# Patient Record
Sex: MALE | Race: WHITE | HISPANIC OR LATINO | Employment: FULL TIME | URBAN - METROPOLITAN AREA
[De-identification: names, ages, dates, MRNs, and addresses within clinical notes are randomized per-mention and may not be internally consistent; named-entity substitution may affect disease eponyms.]

---

## 2017-01-27 ENCOUNTER — APPOINTMENT (OUTPATIENT)
Dept: LAB | Facility: MEDICAL CENTER | Age: 57
End: 2017-01-27
Payer: COMMERCIAL

## 2017-01-27 ENCOUNTER — ALLSCRIPTS OFFICE VISIT (OUTPATIENT)
Dept: OTHER | Facility: OTHER | Age: 57
End: 2017-01-27

## 2017-01-27 DIAGNOSIS — Z86.711 PERSONAL HISTORY OF PULMONARY EMBOLISM: ICD-10-CM

## 2017-01-27 DIAGNOSIS — Z86.718 PERSONAL HISTORY OF OTHER VENOUS THROMBOSIS AND EMBOLISM: ICD-10-CM

## 2017-01-27 DIAGNOSIS — M17.0 PRIMARY OSTEOARTHRITIS OF BOTH KNEES: ICD-10-CM

## 2017-01-27 LAB
ALBUMIN SERPL BCP-MCNC: 3.9 G/DL (ref 3.5–5)
ALP SERPL-CCNC: 75 U/L (ref 46–116)
ALT SERPL W P-5'-P-CCNC: 30 U/L (ref 12–78)
ANION GAP SERPL CALCULATED.3IONS-SCNC: 7 MMOL/L (ref 4–13)
APTT PPP: 26 SECONDS (ref 24–36)
AST SERPL W P-5'-P-CCNC: 11 U/L (ref 5–45)
BASOPHILS # BLD AUTO: 0.03 THOUSANDS/ΜL (ref 0–0.1)
BASOPHILS NFR BLD AUTO: 1 % (ref 0–1)
BILIRUB SERPL-MCNC: 0.56 MG/DL (ref 0.2–1)
BUN SERPL-MCNC: 14 MG/DL (ref 5–25)
CALCIUM SERPL-MCNC: 9.1 MG/DL (ref 8.3–10.1)
CHLORIDE SERPL-SCNC: 108 MMOL/L (ref 100–108)
CO2 SERPL-SCNC: 27 MMOL/L (ref 21–32)
CREAT SERPL-MCNC: 0.94 MG/DL (ref 0.6–1.3)
EOSINOPHIL # BLD AUTO: 0.16 THOUSAND/ΜL (ref 0–0.61)
EOSINOPHIL NFR BLD AUTO: 3 % (ref 0–6)
ERYTHROCYTE [DISTWIDTH] IN BLOOD BY AUTOMATED COUNT: 14.1 % (ref 11.6–15.1)
GFR SERPL CREATININE-BSD FRML MDRD: >60 ML/MIN/1.73SQ M
GLUCOSE SERPL-MCNC: 97 MG/DL (ref 65–140)
HCT VFR BLD AUTO: 45.5 % (ref 36.5–49.3)
HGB BLD-MCNC: 15.1 G/DL (ref 12–17)
INR PPP: 0.94 (ref 0.86–1.16)
LYMPHOCYTES # BLD AUTO: 1.39 THOUSANDS/ΜL (ref 0.6–4.47)
LYMPHOCYTES NFR BLD AUTO: 22 % (ref 14–44)
MCH RBC QN AUTO: 30.2 PG (ref 26.8–34.3)
MCHC RBC AUTO-ENTMCNC: 33.2 G/DL (ref 31.4–37.4)
MCV RBC AUTO: 91 FL (ref 82–98)
MONOCYTES # BLD AUTO: 0.47 THOUSAND/ΜL (ref 0.17–1.22)
MONOCYTES NFR BLD AUTO: 8 % (ref 4–12)
NEUTROPHILS # BLD AUTO: 4.2 THOUSANDS/ΜL (ref 1.85–7.62)
NEUTS SEG NFR BLD AUTO: 66 % (ref 43–75)
NRBC BLD AUTO-RTO: 0 /100 WBCS
PLATELET # BLD AUTO: 195 THOUSANDS/UL (ref 149–390)
PMV BLD AUTO: 11.2 FL (ref 8.9–12.7)
POTASSIUM SERPL-SCNC: 4.4 MMOL/L (ref 3.5–5.3)
PROT SERPL-MCNC: 7.3 G/DL (ref 6.4–8.2)
PROTHROMBIN TIME: 12.7 SECONDS (ref 12–14.3)
RBC # BLD AUTO: 5 MILLION/UL (ref 3.88–5.62)
SODIUM SERPL-SCNC: 142 MMOL/L (ref 136–145)
WBC # BLD AUTO: 6.27 THOUSAND/UL (ref 4.31–10.16)

## 2017-01-27 PROCEDURE — 85610 PROTHROMBIN TIME: CPT

## 2017-01-27 PROCEDURE — 85025 COMPLETE CBC W/AUTO DIFF WBC: CPT

## 2017-01-27 PROCEDURE — 85730 THROMBOPLASTIN TIME PARTIAL: CPT

## 2017-01-27 PROCEDURE — 80053 COMPREHEN METABOLIC PANEL: CPT

## 2017-01-27 PROCEDURE — 36415 COLL VENOUS BLD VENIPUNCTURE: CPT

## 2017-02-16 ENCOUNTER — APPOINTMENT (OUTPATIENT)
Dept: PHYSICAL THERAPY | Facility: CLINIC | Age: 57
End: 2017-02-16
Payer: COMMERCIAL

## 2017-02-16 PROCEDURE — 97162 PT EVAL MOD COMPLEX 30 MIN: CPT

## 2017-02-20 ENCOUNTER — APPOINTMENT (OUTPATIENT)
Dept: PHYSICAL THERAPY | Facility: CLINIC | Age: 57
End: 2017-02-20
Payer: COMMERCIAL

## 2017-02-20 PROCEDURE — 97110 THERAPEUTIC EXERCISES: CPT

## 2017-02-23 ENCOUNTER — APPOINTMENT (OUTPATIENT)
Dept: PHYSICAL THERAPY | Facility: CLINIC | Age: 57
End: 2017-02-23
Payer: COMMERCIAL

## 2017-02-24 ENCOUNTER — APPOINTMENT (OUTPATIENT)
Dept: PHYSICAL THERAPY | Facility: CLINIC | Age: 57
End: 2017-02-24
Payer: COMMERCIAL

## 2017-02-24 PROCEDURE — 97110 THERAPEUTIC EXERCISES: CPT

## 2017-02-27 ENCOUNTER — APPOINTMENT (OUTPATIENT)
Dept: PHYSICAL THERAPY | Facility: CLINIC | Age: 57
End: 2017-02-27
Payer: COMMERCIAL

## 2017-02-27 PROCEDURE — 97110 THERAPEUTIC EXERCISES: CPT

## 2017-03-02 ENCOUNTER — APPOINTMENT (OUTPATIENT)
Dept: PHYSICAL THERAPY | Facility: CLINIC | Age: 57
End: 2017-03-02
Payer: COMMERCIAL

## 2017-03-02 PROCEDURE — 97110 THERAPEUTIC EXERCISES: CPT

## 2017-03-06 ENCOUNTER — APPOINTMENT (OUTPATIENT)
Dept: PHYSICAL THERAPY | Facility: CLINIC | Age: 57
End: 2017-03-06
Payer: COMMERCIAL

## 2017-03-09 ENCOUNTER — APPOINTMENT (OUTPATIENT)
Dept: PHYSICAL THERAPY | Facility: CLINIC | Age: 57
End: 2017-03-09
Payer: COMMERCIAL

## 2017-03-10 ENCOUNTER — APPOINTMENT (OUTPATIENT)
Dept: PHYSICAL THERAPY | Facility: CLINIC | Age: 57
End: 2017-03-10
Payer: COMMERCIAL

## 2017-03-13 ENCOUNTER — APPOINTMENT (OUTPATIENT)
Dept: PHYSICAL THERAPY | Facility: CLINIC | Age: 57
End: 2017-03-13
Payer: COMMERCIAL

## 2017-03-16 ENCOUNTER — APPOINTMENT (OUTPATIENT)
Dept: PHYSICAL THERAPY | Facility: CLINIC | Age: 57
End: 2017-03-16
Payer: COMMERCIAL

## 2017-03-17 ENCOUNTER — APPOINTMENT (OUTPATIENT)
Dept: PHYSICAL THERAPY | Facility: CLINIC | Age: 57
End: 2017-03-17
Payer: COMMERCIAL

## 2017-04-24 ENCOUNTER — GENERIC CONVERSION - ENCOUNTER (OUTPATIENT)
Dept: OTHER | Facility: OTHER | Age: 57
End: 2017-04-24

## 2017-09-21 ENCOUNTER — APPOINTMENT (OUTPATIENT)
Dept: PHYSICAL THERAPY | Facility: CLINIC | Age: 57
End: 2017-09-21
Payer: COMMERCIAL

## 2017-09-21 PROCEDURE — 97161 PT EVAL LOW COMPLEX 20 MIN: CPT

## 2017-09-21 PROCEDURE — G8984 CARRY CURRENT STATUS: HCPCS

## 2017-09-21 PROCEDURE — G8985 CARRY GOAL STATUS: HCPCS

## 2017-09-21 PROCEDURE — 97035 APP MDLTY 1+ULTRASOUND EA 15: CPT

## 2017-11-21 ENCOUNTER — TRANSCRIBE ORDERS (OUTPATIENT)
Dept: ADMINISTRATIVE | Facility: HOSPITAL | Age: 57
End: 2017-11-21

## 2017-11-21 ENCOUNTER — APPOINTMENT (OUTPATIENT)
Dept: PHYSICAL THERAPY | Facility: CLINIC | Age: 57
End: 2017-11-21
Payer: COMMERCIAL

## 2017-11-21 ENCOUNTER — HOSPITAL ENCOUNTER (OUTPATIENT)
Dept: RADIOLOGY | Facility: HOSPITAL | Age: 57
Discharge: HOME | End: 2017-11-21
Attending: ORTHOPAEDIC SURGERY
Payer: COMMERCIAL

## 2017-11-21 ENCOUNTER — APPOINTMENT (OUTPATIENT)
Dept: PREADMISSION TESTING | Facility: HOSPITAL | Age: 57
End: 2017-11-21
Payer: COMMERCIAL

## 2017-11-21 ENCOUNTER — ANESTHESIA EVENT (OUTPATIENT)
Dept: PERIOP | Facility: HOSPITAL | Age: 57
DRG: 470 | End: 2017-11-21
Payer: COMMERCIAL

## 2017-11-21 ENCOUNTER — APPOINTMENT (OUTPATIENT)
Dept: LAB | Facility: HOSPITAL | Age: 57
End: 2017-11-21
Attending: ORTHOPAEDIC SURGERY
Payer: COMMERCIAL

## 2017-11-21 ENCOUNTER — HOSPITAL ENCOUNTER (OUTPATIENT)
Dept: NON INVASIVE DIAGNOSTICS | Facility: HOSPITAL | Age: 57
Discharge: HOME | End: 2017-11-21
Attending: ORTHOPAEDIC SURGERY
Payer: COMMERCIAL

## 2017-11-21 VITALS
WEIGHT: 232 LBS | TEMPERATURE: 97.6 F | RESPIRATION RATE: 18 BRPM | BODY MASS INDEX: 32.48 KG/M2 | HEIGHT: 71 IN | DIASTOLIC BLOOD PRESSURE: 84 MMHG | SYSTOLIC BLOOD PRESSURE: 132 MMHG | HEART RATE: 58 BPM

## 2017-11-21 DIAGNOSIS — M17.11 OSTEOARTHRITIS OF RIGHT KNEE, UNSPECIFIED OSTEOARTHRITIS TYPE: ICD-10-CM

## 2017-11-21 DIAGNOSIS — Z01.818 PREOP EXAMINATION: Primary | ICD-10-CM

## 2017-11-21 DIAGNOSIS — Z01.818 PREOP EXAMINATION: ICD-10-CM

## 2017-11-21 LAB
ABO GROUP BLD: NORMAL
ALBUMIN SERPL BCP-MCNC: 3.8 G/DL (ref 3.5–5)
ALP SERPL-CCNC: 68 U/L (ref 46–116)
ALT SERPL W P-5'-P-CCNC: 26 U/L (ref 12–78)
ANION GAP SERPL CALCULATED.3IONS-SCNC: 10 MMOL/L (ref 4–13)
AST SERPL W P-5'-P-CCNC: 17 U/L (ref 5–45)
ATRIAL RATE: 51 BPM
BASOPHILS # BLD AUTO: 0.02 THOUSANDS/ΜL (ref 0–0.1)
BASOPHILS NFR BLD AUTO: 0 % (ref 0–1)
BILIRUB SERPL-MCNC: 0.63 MG/DL (ref 0.2–1)
BILIRUB UR QL STRIP: NEGATIVE
BLD GP AB SCN SERPL QL: NEGATIVE
BUN SERPL-MCNC: 16 MG/DL (ref 5–25)
CALCIUM SERPL-MCNC: 9 MG/DL (ref 8.3–10.1)
CHLORIDE SERPL-SCNC: 107 MMOL/L (ref 100–108)
CLARITY UR: CLEAR
CO2 SERPL-SCNC: 25 MMOL/L (ref 21–32)
COLOR UR: YELLOW
CREAT SERPL-MCNC: 0.93 MG/DL (ref 0.6–1.3)
EOSINOPHIL # BLD AUTO: 0.12 THOUSAND/ΜL (ref 0–0.61)
EOSINOPHIL NFR BLD AUTO: 2 % (ref 0–6)
ERYTHROCYTE [DISTWIDTH] IN BLOOD BY AUTOMATED COUNT: 14.2 % (ref 11.6–15.1)
GFR SERPL CREATININE-BSD FRML MDRD: 91 ML/MIN/1.73SQ M
GLUCOSE SERPL-MCNC: 95 MG/DL (ref 65–140)
GLUCOSE UR STRIP-MCNC: NEGATIVE MG/DL
HCT VFR BLD AUTO: 42.5 % (ref 36.5–49.3)
HGB BLD-MCNC: 14.5 G/DL (ref 12–17)
HGB UR QL STRIP.AUTO: NEGATIVE
INR PPP: 0.97 (ref 0.86–1.16)
KETONES UR STRIP-MCNC: NEGATIVE MG/DL
LEUKOCYTE ESTERASE UR QL STRIP: NEGATIVE
LYMPHOCYTES # BLD AUTO: 1.16 THOUSANDS/ΜL (ref 0.6–4.47)
LYMPHOCYTES NFR BLD AUTO: 22 % (ref 14–44)
MCH RBC QN AUTO: 31.2 PG (ref 26.8–34.3)
MCHC RBC AUTO-ENTMCNC: 34.1 G/DL (ref 31.4–37.4)
MCV RBC AUTO: 91 FL (ref 82–98)
MONOCYTES # BLD AUTO: 0.41 THOUSAND/ΜL (ref 0.17–1.22)
MONOCYTES NFR BLD AUTO: 8 % (ref 4–12)
NEUTROPHILS # BLD AUTO: 3.57 THOUSANDS/ΜL (ref 1.85–7.62)
NEUTS SEG NFR BLD AUTO: 68 % (ref 43–75)
NITRITE UR QL STRIP: NEGATIVE
NRBC BLD AUTO-RTO: 0 /100 WBCS
P AXIS: 41 DEGREES
PH UR STRIP.AUTO: 6 [PH] (ref 4.5–8)
PLATELET # BLD AUTO: 167 THOUSANDS/UL (ref 149–390)
PMV BLD AUTO: 11.7 FL (ref 8.9–12.7)
POTASSIUM SERPL-SCNC: 4 MMOL/L (ref 3.5–5.3)
PR INTERVAL: 154 MS
PROT SERPL-MCNC: 6.8 G/DL (ref 6.4–8.2)
PROT UR STRIP-MCNC: NEGATIVE MG/DL
PROTHROMBIN TIME: 12.9 SECONDS (ref 12.1–14.4)
QRS AXIS: 4 DEGREES
QRSD INTERVAL: 96 MS
QT INTERVAL: 420 MS
QTC INTERVAL: 387 MS
RBC # BLD AUTO: 4.65 MILLION/UL (ref 3.88–5.62)
RH BLD: POSITIVE
SODIUM SERPL-SCNC: 142 MMOL/L (ref 136–145)
SP GR UR STRIP.AUTO: 1.02 (ref 1–1.03)
SPECIMEN EXPIRATION DATE: NORMAL
T WAVE AXIS: 39 DEGREES
UROBILINOGEN UR QL STRIP.AUTO: 0.2 E.U./DL
VENTRICULAR RATE: 51 BPM
WBC # BLD AUTO: 5.28 THOUSAND/UL (ref 4.31–10.16)

## 2017-11-21 PROCEDURE — 93005 ELECTROCARDIOGRAM TRACING: CPT

## 2017-11-21 PROCEDURE — 97164 PT RE-EVAL EST PLAN CARE: CPT

## 2017-11-21 PROCEDURE — 85025 COMPLETE CBC W/AUTO DIFF WBC: CPT

## 2017-11-21 PROCEDURE — 36415 COLL VENOUS BLD VENIPUNCTURE: CPT

## 2017-11-21 PROCEDURE — G8978 MOBILITY CURRENT STATUS: HCPCS

## 2017-11-21 PROCEDURE — 86850 RBC ANTIBODY SCREEN: CPT

## 2017-11-21 PROCEDURE — 86901 BLOOD TYPING SEROLOGIC RH(D): CPT

## 2017-11-21 PROCEDURE — 86900 BLOOD TYPING SEROLOGIC ABO: CPT

## 2017-11-21 PROCEDURE — 80053 COMPREHEN METABOLIC PANEL: CPT

## 2017-11-21 PROCEDURE — 97110 THERAPEUTIC EXERCISES: CPT

## 2017-11-21 PROCEDURE — 71020 HB CHEST X-RAY 2VW FRONTAL&LATL: CPT

## 2017-11-21 PROCEDURE — 81003 URINALYSIS AUTO W/O SCOPE: CPT | Performed by: ORTHOPAEDIC SURGERY

## 2017-11-21 PROCEDURE — G8979 MOBILITY GOAL STATUS: HCPCS

## 2017-11-21 PROCEDURE — 85610 PROTHROMBIN TIME: CPT

## 2017-11-21 RX ORDER — SODIUM CHLORIDE 9 MG/ML
125 INJECTION, SOLUTION INTRAVENOUS CONTINUOUS
Status: CANCELLED | OUTPATIENT
Start: 2017-11-21

## 2017-11-21 NOTE — ANESTHESIA PREPROCEDURE EVALUATION
Review of Systems/Medical History  Patient summary reviewed  Chart reviewed      Cardiovascular  Exercise tolerance: good,     Pulmonary  ,   Comment: Hx pe       GI/Hepatic  Negative GI/hepatic ROS          Negative  ROS        Endo/Other  Negative endo/other ROS Arthritis     GYN       Hematology   Musculoskeletal  Negative musculoskeletal ROS        Neurology  Negative neurology ROS      Psychology   Negative psychology ROS            Physical Exam    Airway    Mallampati score: II  TM Distance: <3 FB  Neck ROM: full     Dental       Cardiovascular  Rhythm: regular, Rate: normal,     Pulmonary  Breath sounds clear to auscultation,     Other Findings  cap      Anesthesia Plan  ASA Score- 2       Anesthesia Type- regional and spinal with ASA Monitors  Additional Monitors:   Airway Plan:           Induction- intravenous  Informed Consent- Anesthetic plan and risks discussed with patient

## 2017-11-28 ENCOUNTER — ALLSCRIPTS OFFICE VISIT (OUTPATIENT)
Dept: OTHER | Facility: OTHER | Age: 57
End: 2017-11-28

## 2017-11-28 ENCOUNTER — APPOINTMENT (OUTPATIENT)
Dept: PHYSICAL THERAPY | Facility: CLINIC | Age: 57
End: 2017-11-28
Payer: COMMERCIAL

## 2017-11-28 PROCEDURE — 97140 MANUAL THERAPY 1/> REGIONS: CPT

## 2017-11-28 PROCEDURE — G8980 MOBILITY D/C STATUS: HCPCS

## 2017-11-28 PROCEDURE — G8979 MOBILITY GOAL STATUS: HCPCS

## 2017-11-28 PROCEDURE — 97110 THERAPEUTIC EXERCISES: CPT

## 2017-11-29 NOTE — PROGRESS NOTES
Assessment    1  Encounter for pre-operative examination (V72 84) (Z01 818)   2  Arthritis of right knee (716 96) (M17 11)    Discussion/Summary    Preop clearance for upcoming R TKR on 12/5 at [facility] by Dr Lcuero Senior  otherwise pt is doing well today  pt does not take any Rx medications  Patient has history of DVT with physical exam following knee surgery in 1994  His examination today is unremarkable  Pre-admission testing from November 21st, including chest x-ray, EKG, and labs were reviewed today  Due to past provoked DVT/PE following surgery, I agree with Dr Eloina Garcia recommendation to use low molecular weight heparin 1 day prior to surgery and warfarin for 6 weeks following surgery  is medically cleared for surgery  Chief Complaint  Pt presents for pre-op clearance for R total knee replacement surgery being done on 17/1/39 by Dr Talita Rocha [place] [id number]  Pt had PATs already done @ [facility]  History of Present Illness  HPI: Preop clearance for upcoming R TKR on 12/5 at [facility] by Dr Lucero Senior  otherwise pt is doing well today  pt does not take any Rx medications  Patient has history of DVT with physical exam following knee surgery in 1994  Review of Systems   Constitutional: No fever or chills, feels well, no tiredness, no recent weight gain or weight loss  Cardiovascular: No complaints of slow heart rate, no fast heart rate, no chest pain, no palpitations, no leg claudication, no lower extremity  Respiratory: No complaints of shortness of breath, no wheezing, no cough, no SOB on exertion, no orthopnea or PND  Gastrointestinal: No complaints of abdominal pain, no constipation, no nausea or vomiting, no diarrhea or bloody stools  Genitourinary: No complaints of dysuria, no incontinence, no hesitancy, no nocturia, no genital lesion, no testicular pain  Musculoskeletal: as noted in HPI  Active Problems  1  Bilateral hearing loss (389 9) (H91 93)   2   Disc degeneration, lumbar (722 52) (M51 36)   3  Fatigue (780 79) (R53 83)   4  Food allergy (V15 05) (Z91 018)   5  GERD without esophagitis (530 81) (K21 9)   6  Osteoarthritis of both knees, unspecified osteoarthritis type (715 96) (M17 0)   7  Squamous cell carcinomatosis (199 0) (C80 0)    Past Medical History   · History of Atypical chest pain (786 59) (R07 89)   · History of Encounter for screening colonoscopy (V76 51) (Z12 11)   · History of DVT (deep vein thrombosis) (V12 51) (Z86 718)   · History of influenza vaccination (V49 89) (Z92 29)   · History of pulmonary embolism (V12 55) (Z86 711)   · History of Need for immunization against influenza (V04 81) (Z23)    Surgical History   · History of Arthroscopy Knee Right   · History of Colonoscopy (Fiberoptic)    The surgical history was reviewed and updated today  Family History  Mother    · Family history of Alzheimer's disease of other onset without behavioral disturbance  Father    · Family history of Acute congestive heart failure, unspecified congestive heart failure type  Brother    · Family history of leukemia (V16 6) (Z80 6)  Paternal Grandmother    · Family history of diabetes mellitus (V18 0) (Z83 3)  Paternal Grandfather    · Family history of malignant neoplasm (V16 9) (Z80 9)    Social History     · Denied: History of Alcohol use   · Always uses seat belt   · Current some day smoker (305 1) (F17 200)   · No guns in the home   · Primary language is English   · Reads English  The social history was reviewed and updated today  The social history was reviewed and is unchanged  Current Meds   1  Allegra Allergy TABS; Therapy: (Recorded:[id number]) to Recorded    The medication list was reviewed and updated today  Allergies  1  No Known Drug Allergies  2  Peanuts   3  Soy   4   Wheat    Vitals   Recorded: 16OWF9904 10:53AM   Temperature 97 1 F, Tympanic   Heart Rate 59   Pulse Quality Normal   Respiration Quality Normal   Respiration 14   Systolic 326, LUE, Sitting Diastolic 76, LUE, Sitting   BP CUFF SIZE Large   Height 5 ft 10 in   Weight 228 lb 6 oz   BMI Calculated 32 77   BSA Calculated 2 21   O2 Saturation 98       Physical Exam   Constitutional  General appearance: No acute distress, well appearing and well nourished  Pulmonary  Respiratory effort: No increased work of breathing or signs of respiratory distress  Auscultation of lungs: Clear to auscultation  Cardiovascular  Palpation of heart: Normal PMI, no thrills  Auscultation of heart: Normal rate and rhythm, normal S1 and S2, without murmurs  Examination of extremities for edema and/or varicosities: Normal    Abdomen  Abdomen: Non-tender, no masses  Liver and spleen: No hepatomegaly or splenomegaly  Lymphatic  Palpation of lymph nodes in neck: No lymphadenopathy  Musculoskeletal  Gait and station: Normal    Psychiatric  Orientation to person, place and time: Normal    Mood and affect: Normal        Results/Data  PHQ-9 Adult Depression Screening 28Nov2017 10:57AM User, [facility]     Test Name Result Flag Reference   PHQ-9 Adult Depression Score 2       Over the last two weeks, how often have you been bothered by any of the following problems? Little interest or pleasure in doing things: Not at all - 0 Feeling down, depressed, or hopeless: Not at all - 0 Trouble falling or staying asleep, or sleeping too much: Several days - 1 Feeling tired or having little energy: Several days - 1 Poor appetite or over eating: Not at all - 0 Feeling bad about yourself - or that you are a failure or have let yourself or your family down: Not at all - 0 Trouble concentrating on things, such as reading the newspaper or watching television: Not at all - 0 Moving or speaking so slowly that other people could have noticed   Or the opposite -  being so fidgety or restless that you have been moving around a lot more than usual: Not at all - 0 Thoughts that you would be better off dead, or of hurting yourself in some way: Not at all - 0   PHQ-9 Adult Depression Screening Negative     PHQ-9 Difficulty Level Not difficult at all     PHQ-9 Severity Minimal Depression         End of Encounter Meds    1  Allegra Allergy TABS (Fexofenadine HCl); Therapy: (Recorded:[id number]) to Recorded    Signatures   Electronically signed by :  Wandy Deras DO; Nov 28 2017 11:20AM EST                       (Author)

## 2017-12-05 ENCOUNTER — ANESTHESIA (OUTPATIENT)
Dept: PERIOP | Facility: HOSPITAL | Age: 57
DRG: 470 | End: 2017-12-05
Payer: COMMERCIAL

## 2017-12-05 ENCOUNTER — HOSPITAL ENCOUNTER (INPATIENT)
Facility: HOSPITAL | Age: 57
LOS: 2 days | Discharge: HOME HEALTH | DRG: 470 | End: 2017-12-07
Attending: ORTHOPAEDIC SURGERY | Admitting: ORTHOPAEDIC SURGERY
Payer: COMMERCIAL

## 2017-12-05 ENCOUNTER — APPOINTMENT (INPATIENT)
Dept: RADIOLOGY | Facility: HOSPITAL | Age: 57
DRG: 470 | End: 2017-12-05
Payer: COMMERCIAL

## 2017-12-05 DIAGNOSIS — M17.11 PRIMARY OSTEOARTHRITIS OF RIGHT KNEE: Primary | ICD-10-CM

## 2017-12-05 LAB
INR PPP: 0.94 (ref 0.86–1.16)
PROTHROMBIN TIME: 12.6 SECONDS (ref 12.1–14.4)

## 2017-12-05 PROCEDURE — C1713 ANCHOR/SCREW BN/BN,TIS/BN: HCPCS | Performed by: ORTHOPAEDIC SURGERY

## 2017-12-05 PROCEDURE — G8978 MOBILITY CURRENT STATUS: HCPCS

## 2017-12-05 PROCEDURE — 97163 PT EVAL HIGH COMPLEX 45 MIN: CPT

## 2017-12-05 PROCEDURE — C1776 JOINT DEVICE (IMPLANTABLE): HCPCS | Performed by: ORTHOPAEDIC SURGERY

## 2017-12-05 PROCEDURE — 85610 PROTHROMBIN TIME: CPT | Performed by: ORTHOPAEDIC SURGERY

## 2017-12-05 PROCEDURE — 94762 N-INVAS EAR/PLS OXIMTRY CONT: CPT

## 2017-12-05 PROCEDURE — 0SRC0J9 REPLACEMENT OF RIGHT KNEE JOINT WITH SYNTHETIC SUBSTITUTE, CEMENTED, OPEN APPROACH: ICD-10-PCS | Performed by: ORTHOPAEDIC SURGERY

## 2017-12-05 PROCEDURE — G8979 MOBILITY GOAL STATUS: HCPCS

## 2017-12-05 PROCEDURE — 73560 X-RAY EXAM OF KNEE 1 OR 2: CPT

## 2017-12-05 DEVICE — ATTUNE KNEE SYSTEM TIBIAL INSERT FIXED BEARING POSTERIOR STABILIZED 7 7MM AOX
Type: IMPLANTABLE DEVICE | Site: KNEE | Status: FUNCTIONAL
Brand: ATTUNE

## 2017-12-05 DEVICE — SMARTSET GMV HIGH PERFORMANCE GENTAMICIN MEDIUM VISCOSITY BONE CEMENT 40G
Type: IMPLANTABLE DEVICE | Site: KNEE | Status: FUNCTIONAL
Brand: SMARTSET

## 2017-12-05 DEVICE — ATTUNE KNEE SYSTEM TIBIAL BASE FIXED BEARING SIZE 7 CEMENTED
Type: IMPLANTABLE DEVICE | Site: KNEE | Status: FUNCTIONAL
Brand: ATTUNE

## 2017-12-05 DEVICE — ATTUNE PATELLA MEDIALIZED ANATOMIC 35MM CEMENTED AOX
Type: IMPLANTABLE DEVICE | Site: KNEE | Status: FUNCTIONAL
Brand: ATTUNE

## 2017-12-05 DEVICE — ATTUNE KNEE SYSTEM FEMORAL POSTERIOR STABILIZED SIZE 7 RIGHT CEMENTED
Type: IMPLANTABLE DEVICE | Site: KNEE | Status: FUNCTIONAL
Brand: ATTUNE

## 2017-12-05 RX ORDER — ZOLPIDEM TARTRATE 5 MG/1
5 TABLET ORAL
Status: DISCONTINUED | OUTPATIENT
Start: 2017-12-06 | End: 2017-12-07 | Stop reason: HOSPADM

## 2017-12-05 RX ORDER — ONDANSETRON 2 MG/ML
INJECTION INTRAMUSCULAR; INTRAVENOUS AS NEEDED
Status: DISCONTINUED | OUTPATIENT
Start: 2017-12-05 | End: 2017-12-05 | Stop reason: SURG

## 2017-12-05 RX ORDER — OXYCODONE HYDROCHLORIDE 10 MG/1
10 TABLET ORAL EVERY 4 HOURS PRN
Status: DISCONTINUED | OUTPATIENT
Start: 2017-12-06 | End: 2017-12-07 | Stop reason: HOSPADM

## 2017-12-05 RX ORDER — LORATADINE 10 MG/1
5 TABLET ORAL
Status: DISCONTINUED | OUTPATIENT
Start: 2017-12-05 | End: 2017-12-05

## 2017-12-05 RX ORDER — KETOROLAC TROMETHAMINE 30 MG/ML
30 INJECTION, SOLUTION INTRAMUSCULAR; INTRAVENOUS EVERY 6 HOURS
Status: DISCONTINUED | OUTPATIENT
Start: 2017-12-05 | End: 2017-12-07 | Stop reason: HOSPADM

## 2017-12-05 RX ORDER — ONDANSETRON 2 MG/ML
4 INJECTION INTRAMUSCULAR; INTRAVENOUS EVERY 8 HOURS PRN
Status: DISCONTINUED | OUTPATIENT
Start: 2017-12-06 | End: 2017-12-07 | Stop reason: HOSPADM

## 2017-12-05 RX ORDER — LORATADINE 10 MG/1
10 TABLET ORAL DAILY
Status: DISCONTINUED | OUTPATIENT
Start: 2017-12-06 | End: 2017-12-07 | Stop reason: HOSPADM

## 2017-12-05 RX ORDER — ONDANSETRON 2 MG/ML
4 INJECTION INTRAMUSCULAR; INTRAVENOUS ONCE AS NEEDED
Status: DISCONTINUED | OUTPATIENT
Start: 2017-12-05 | End: 2017-12-05 | Stop reason: HOSPADM

## 2017-12-05 RX ORDER — MAGNESIUM HYDROXIDE 1200 MG/15ML
LIQUID ORAL AS NEEDED
Status: DISCONTINUED | OUTPATIENT
Start: 2017-12-05 | End: 2017-12-05 | Stop reason: HOSPADM

## 2017-12-05 RX ORDER — CALCIUM CARBONATE 200(500)MG
1000 TABLET,CHEWABLE ORAL DAILY PRN
Status: DISCONTINUED | OUTPATIENT
Start: 2017-12-05 | End: 2017-12-07 | Stop reason: HOSPADM

## 2017-12-05 RX ORDER — METHOCARBAMOL 500 MG/1
500 TABLET, FILM COATED ORAL EVERY 6 HOURS PRN
Status: DISCONTINUED | OUTPATIENT
Start: 2017-12-05 | End: 2017-12-07 | Stop reason: HOSPADM

## 2017-12-05 RX ORDER — CELECOXIB 200 MG/1
200 CAPSULE ORAL DAILY
Status: DISCONTINUED | OUTPATIENT
Start: 2017-12-05 | End: 2017-12-07 | Stop reason: HOSPADM

## 2017-12-05 RX ORDER — TRAMADOL HYDROCHLORIDE 50 MG/1
50 TABLET ORAL EVERY 6 HOURS PRN
Status: DISCONTINUED | OUTPATIENT
Start: 2017-12-06 | End: 2017-12-07 | Stop reason: HOSPADM

## 2017-12-05 RX ORDER — NALOXONE HYDROCHLORIDE 0.4 MG/ML
0.04 INJECTION, SOLUTION INTRAMUSCULAR; INTRAVENOUS; SUBCUTANEOUS
Status: DISCONTINUED | OUTPATIENT
Start: 2017-12-06 | End: 2017-12-07 | Stop reason: HOSPADM

## 2017-12-05 RX ORDER — DOCUSATE SODIUM 100 MG/1
100 CAPSULE, LIQUID FILLED ORAL 2 TIMES DAILY
Status: DISCONTINUED | OUTPATIENT
Start: 2017-12-05 | End: 2017-12-07 | Stop reason: HOSPADM

## 2017-12-05 RX ORDER — HEPARIN SODIUM 5000 [USP'U]/ML
INJECTION, SOLUTION INTRAVENOUS; SUBCUTANEOUS AS NEEDED
Status: DISCONTINUED | OUTPATIENT
Start: 2017-12-05 | End: 2017-12-05 | Stop reason: SURG

## 2017-12-05 RX ORDER — FENTANYL CITRATE 50 UG/ML
INJECTION, SOLUTION INTRAMUSCULAR; INTRAVENOUS AS NEEDED
Status: DISCONTINUED | OUTPATIENT
Start: 2017-12-05 | End: 2017-12-05 | Stop reason: SURG

## 2017-12-05 RX ORDER — MORPHINE SULFATE 0.5 MG/ML
INJECTION, SOLUTION EPIDURAL; INTRATHECAL; INTRAVENOUS AS NEEDED
Status: DISCONTINUED | OUTPATIENT
Start: 2017-12-05 | End: 2017-12-05 | Stop reason: SURG

## 2017-12-05 RX ORDER — KETOROLAC TROMETHAMINE 30 MG/ML
15 INJECTION, SOLUTION INTRAMUSCULAR; INTRAVENOUS EVERY 6 HOURS PRN
Status: DISCONTINUED | OUTPATIENT
Start: 2017-12-09 | End: 2017-12-07 | Stop reason: HOSPADM

## 2017-12-05 RX ORDER — BUPIVACAINE HYDROCHLORIDE 7.5 MG/ML
INJECTION, SOLUTION INTRASPINAL AS NEEDED
Status: DISCONTINUED | OUTPATIENT
Start: 2017-12-05 | End: 2017-12-05 | Stop reason: SURG

## 2017-12-05 RX ORDER — MORPHINE SULFATE 2 MG/ML
2 INJECTION, SOLUTION INTRAMUSCULAR; INTRAVENOUS EVERY 2 HOUR PRN
Status: DISCONTINUED | OUTPATIENT
Start: 2017-12-06 | End: 2017-12-07 | Stop reason: HOSPADM

## 2017-12-05 RX ORDER — MIDAZOLAM HYDROCHLORIDE 1 MG/ML
INJECTION INTRAMUSCULAR; INTRAVENOUS AS NEEDED
Status: DISCONTINUED | OUTPATIENT
Start: 2017-12-05 | End: 2017-12-05 | Stop reason: SURG

## 2017-12-05 RX ORDER — SODIUM CHLORIDE, SODIUM LACTATE, POTASSIUM CHLORIDE, CALCIUM CHLORIDE 600; 310; 30; 20 MG/100ML; MG/100ML; MG/100ML; MG/100ML
100 INJECTION, SOLUTION INTRAVENOUS CONTINUOUS
Status: DISCONTINUED | OUTPATIENT
Start: 2017-12-05 | End: 2017-12-07 | Stop reason: HOSPADM

## 2017-12-05 RX ORDER — TRANEXAMIC ACID 100 MG/ML
INJECTION, SOLUTION INTRAVENOUS AS NEEDED
Status: DISCONTINUED | OUTPATIENT
Start: 2017-12-05 | End: 2017-12-05 | Stop reason: HOSPADM

## 2017-12-05 RX ORDER — PROPOFOL 10 MG/ML
INJECTION, EMULSION INTRAVENOUS CONTINUOUS PRN
Status: DISCONTINUED | OUTPATIENT
Start: 2017-12-05 | End: 2017-12-05 | Stop reason: SURG

## 2017-12-05 RX ORDER — ACETAMINOPHEN 325 MG/1
650 TABLET ORAL EVERY 6 HOURS PRN
Status: DISCONTINUED | OUTPATIENT
Start: 2017-12-05 | End: 2017-12-07 | Stop reason: HOSPADM

## 2017-12-05 RX ORDER — SODIUM CHLORIDE 9 MG/ML
125 INJECTION, SOLUTION INTRAVENOUS CONTINUOUS
Status: DISCONTINUED | OUTPATIENT
Start: 2017-12-05 | End: 2017-12-07 | Stop reason: HOSPADM

## 2017-12-05 RX ORDER — NALOXONE HYDROCHLORIDE 0.4 MG/ML
0.1 INJECTION, SOLUTION INTRAMUSCULAR; INTRAVENOUS; SUBCUTANEOUS
Status: ACTIVE | OUTPATIENT
Start: 2017-12-05 | End: 2017-12-06

## 2017-12-05 RX ORDER — OXYCODONE HYDROCHLORIDE 5 MG/1
5-10 TABLET ORAL EVERY 4 HOURS PRN
Qty: 60 TABLET | Refills: 0 | Status: SHIPPED | OUTPATIENT
Start: 2017-12-06 | End: 2018-02-19 | Stop reason: SDUPTHER

## 2017-12-05 RX ORDER — DEXAMETHASONE SODIUM PHOSPHATE 4 MG/ML
8 INJECTION, SOLUTION INTRA-ARTICULAR; INTRALESIONAL; INTRAMUSCULAR; INTRAVENOUS; SOFT TISSUE ONCE AS NEEDED
Status: ACTIVE | OUTPATIENT
Start: 2017-12-05 | End: 2017-12-06

## 2017-12-05 RX ORDER — ONDANSETRON 2 MG/ML
4 INJECTION INTRAMUSCULAR; INTRAVENOUS EVERY 4 HOURS PRN
Status: ACTIVE | OUTPATIENT
Start: 2017-12-05 | End: 2017-12-06

## 2017-12-05 RX ORDER — OXYCODONE HYDROCHLORIDE 5 MG/1
5 TABLET ORAL EVERY 4 HOURS PRN
Status: DISCONTINUED | OUTPATIENT
Start: 2017-12-06 | End: 2017-12-07 | Stop reason: HOSPADM

## 2017-12-05 RX ORDER — TRAMADOL HYDROCHLORIDE 50 MG/1
50 TABLET ORAL EVERY 6 HOURS PRN
Qty: 60 TABLET | Refills: 0 | Status: SHIPPED | OUTPATIENT
Start: 2017-12-06 | End: 2018-02-19 | Stop reason: SDUPTHER

## 2017-12-05 RX ORDER — DIPHENHYDRAMINE HYDROCHLORIDE 50 MG/ML
50 INJECTION INTRAMUSCULAR; INTRAVENOUS EVERY 6 HOURS PRN
Status: DISCONTINUED | OUTPATIENT
Start: 2017-12-05 | End: 2017-12-07 | Stop reason: HOSPADM

## 2017-12-05 RX ORDER — BISACODYL 10 MG
10 SUPPOSITORY, RECTAL RECTAL DAILY PRN
Status: DISCONTINUED | OUTPATIENT
Start: 2017-12-05 | End: 2017-12-07 | Stop reason: HOSPADM

## 2017-12-05 RX ORDER — WARFARIN SODIUM 5 MG/1
5 TABLET ORAL
Status: COMPLETED | OUTPATIENT
Start: 2017-12-05 | End: 2017-12-05

## 2017-12-05 RX ORDER — METOCLOPRAMIDE HYDROCHLORIDE 5 MG/ML
5 INJECTION INTRAMUSCULAR; INTRAVENOUS EVERY 6 HOURS PRN
Status: ACTIVE | OUTPATIENT
Start: 2017-12-05 | End: 2017-12-06

## 2017-12-05 RX ADMIN — BUPIVACAINE HYDROCHLORIDE IN DEXTROSE 1.8 ML: 7.5 INJECTION, SOLUTION SUBARACHNOID at 08:43

## 2017-12-05 RX ADMIN — DEXAMETHASONE SODIUM PHOSPHATE 4 MG: 10 INJECTION INTRAMUSCULAR; INTRAVENOUS at 08:47

## 2017-12-05 RX ADMIN — SODIUM CHLORIDE: 0.9 INJECTION, SOLUTION INTRAVENOUS at 08:37

## 2017-12-05 RX ADMIN — MIDAZOLAM HYDROCHLORIDE 4 MG: 1 INJECTION, SOLUTION INTRAMUSCULAR; INTRAVENOUS at 08:25

## 2017-12-05 RX ADMIN — MORPHINE SULFATE 0.15 MG: 0.5 INJECTION, SOLUTION EPIDURAL; INTRATHECAL; INTRAVENOUS at 08:43

## 2017-12-05 RX ADMIN — SODIUM CHLORIDE, SODIUM LACTATE, POTASSIUM CHLORIDE, AND CALCIUM CHLORIDE 100 ML/HR: .6; .31; .03; .02 INJECTION, SOLUTION INTRAVENOUS at 11:52

## 2017-12-05 RX ADMIN — HEPARIN SODIUM 5000 UNITS: 5000 INJECTION, SOLUTION INTRAVENOUS; SUBCUTANEOUS at 09:51

## 2017-12-05 RX ADMIN — ONDANSETRON HYDROCHLORIDE 4 MG: 2 INJECTION, SOLUTION INTRAVENOUS at 08:47

## 2017-12-05 RX ADMIN — KETOROLAC TROMETHAMINE 15 MG: 30 INJECTION, SOLUTION INTRAMUSCULAR at 17:16

## 2017-12-05 RX ADMIN — FENTANYL CITRATE 50 MCG: 50 INJECTION INTRAMUSCULAR; INTRAVENOUS at 08:25

## 2017-12-05 RX ADMIN — DOCUSATE SODIUM 100 MG: 100 CAPSULE, LIQUID FILLED ORAL at 14:25

## 2017-12-05 RX ADMIN — CEFAZOLIN SODIUM 1000 MG: 1 SOLUTION INTRAVENOUS at 17:15

## 2017-12-05 RX ADMIN — Medication 1 TABLET: at 14:25

## 2017-12-05 RX ADMIN — CEFAZOLIN SODIUM 1000 MG: 1 SOLUTION INTRAVENOUS at 23:54

## 2017-12-05 RX ADMIN — SODIUM CHLORIDE, SODIUM LACTATE, POTASSIUM CHLORIDE, AND CALCIUM CHLORIDE 100 ML/HR: .6; .31; .03; .02 INJECTION, SOLUTION INTRAVENOUS at 21:26

## 2017-12-05 RX ADMIN — METHOCARBAMOL 500 MG: 500 TABLET ORAL at 21:52

## 2017-12-05 RX ADMIN — CELECOXIB 200 MG: 200 CAPSULE ORAL at 14:25

## 2017-12-05 RX ADMIN — KETOROLAC TROMETHAMINE 30 MG: 30 INJECTION, SOLUTION INTRAMUSCULAR at 17:18

## 2017-12-05 RX ADMIN — DOCUSATE SODIUM 100 MG: 100 CAPSULE, LIQUID FILLED ORAL at 17:15

## 2017-12-05 RX ADMIN — KETOROLAC TROMETHAMINE 30 MG: 30 INJECTION, SOLUTION INTRAMUSCULAR at 23:47

## 2017-12-05 RX ADMIN — PROPOFOL 80 MCG/KG/MIN: 10 INJECTION, EMULSION INTRAVENOUS at 08:47

## 2017-12-05 RX ADMIN — KETOROLAC TROMETHAMINE 15 MG: 30 INJECTION, SOLUTION INTRAMUSCULAR at 17:14

## 2017-12-05 RX ADMIN — WARFARIN SODIUM 5 MG: 5 TABLET ORAL at 17:15

## 2017-12-05 RX ADMIN — CEFAZOLIN SODIUM 2000 MG: 2 SOLUTION INTRAVENOUS at 08:38

## 2017-12-05 RX ADMIN — SODIUM CHLORIDE, SODIUM LACTATE, POTASSIUM CHLORIDE, AND CALCIUM CHLORIDE 100 ML/HR: .6; .31; .03; .02 INJECTION, SOLUTION INTRAVENOUS at 10:41

## 2017-12-05 NOTE — ANESTHESIA POSTPROCEDURE EVALUATION
Post-Op Assessment Note      CV Status:  Stable    Mental Status:  Alert and awake    Hydration Status:  Euvolemic    PONV Controlled:  Controlled    Airway Patency:  Patent    Post Op Vitals Reviewed: Yes          Staff: Anesthesiologist           /70 (12/05/17 1149)    Temp      Pulse (!) 50 (12/05/17 1149)   Resp 16 (12/05/17 1149)    SpO2 98 % (12/05/17 1149)

## 2017-12-05 NOTE — ANESTHESIA PROCEDURE NOTES
Peripheral Block    Start time: 12/5/2017 8:25 AM  End time: 12/5/2017 8:34 AM  Reason for block: at surgeon's request and post-op pain management  Staffing  Anesthesiologist: Nasir Alexandra  Preanesthetic Checklist  Completed: patient identified, site marked, surgical consent, pre-op evaluation, timeout performed, IV checked, risks and benefits discussed and monitors and equipment checked  Peripheral Block  Patient position: supine  Prep: ChloraPrep  Patient monitoring: heart rate, continuous pulse ox and frequent blood pressure checks  Block type: adductor canal block  Laterality: right  ultrasound permanent image saved      Needle  Needle type: Stimuplex   Needle gauge: 22 G  Needle length: 10 cm  Needle localization: ultrasound guidance  Assessment  Injection assessment: incremental injection, local visualized surrounding nerve on ultrasound, negative aspiration for heme and no paresthesia on injection  Paresthesia pain: none  Heart rate change: no  Slow fractionated injection: yes  Post-procedure:  site cleaned

## 2017-12-05 NOTE — DISCHARGE INSTRUCTIONS
[address] Operative Joint Replacement Instructions    DEBBI Moreno  Office Phone [phone]      MEDICATIONS  ¨no  Plavix: Patients on Plavix will resume their standard dose while in the hospital    ¨no Enteric Coated Aspirin 325 mg: Take twice daily until your first post operative visit  ¨ Continue as you were in the hospital/rehab facility  ¨ yes Coumadin: If we have started you on this for blood thinning, you will be tested on Mondays and Thursdays  The home health nurse will draw your blood  Our office will select the dosing instructions for you until your next blood draw  If you were on the medication before the surgery, the physician who manages your Coumadin will resume the care of that medication  On the day you have your blood test drawn, do not take your Coumadin dose until contacted by the office  If you have not heard by 4 P  M , please take the dose you took the day before and call the managing doctor's office for instructions in the morning  ¨ Senokot: This is our recommended stool softener  Please use this medication to help prevent constipation that can arise from iron and pain medication use  It is also advisable to increase your fluid intake and fiber in your diet during your joseph and pain medication therapy  ¨ Celebrex: Unless there is a contraindication, you were given a prescription for Celebrex prior to your surgery  Please continue to take daily until you finish the prescription  ¨ Pain Medications: Your prescriptions may run out before you return for your next visit  Please give us two regular office day's notice before you run out, to prevent any problems of not having pain medicine  Be advised that prescriptions for Oxycodone and Vicodin cannot be phoned to your pharmacy  They will need to be picked up at our office  Please refrain from using alcohol with your pain medicines  You may also include Tylenol for pain   You should not exceed 3000 mg of Tylenol in a day  Please be careful to not overdose the Tylenol  ¨ Arthritis/Anti-inflammatory: If you were taking an arthiritis medicine prior to your surgery, please wait until you have stopped the injection blood thinning medicine to resume taking it  ¨ Herbal Medicines: Please hold all these preparations until after your first post-operative visit  ACTIVITY  ¨ Your weight bearing status is: AS TOLERATED  ¨ If you have been furnished with an assistive device  Please use it until your therapist makes a change  ¨ You may participate in activity as tolerated  It is likely that you will tire more easily for a time after surgery  Please rest when you need it to help your healing process  ¨ Stairs are not restricted for you  Please climb stairs as instructed by your physical therapist   Chapincito Mcgowan For hip and knee replacement patients please elevate your leg or legs while resting  This is important to prevent lower leg swelling  ¨ When you are back at home you will likely have physical therapy three times a week  On the days you do not have formal therapy please perform the home exercises given to you  ¨ If you had a hip replacement, please follow the safety precautions given to you by the nurse or therapist taking care of you  ¨Immediately following the surgery you are not permitted to drive until cleared by your surgeon  This typically does not happen until your first visit after surgery or later  ¨Knee replacement patients may be passengers in a vehicle following their discharge from the hospital   ¨Hip replacement patients are not allowed in a vehicle, except for your trip home and your first follow up visist  Please follow these guidelines unless specifically instructed to start outpatient therapy at an earlier time  ¨ Please do not perform lifting tasks or strenuous domestic activities  ¨ If you currently are employed, you are off work until cleared by your surgeon   Everyone's ability to return to work is determined by his or her abilities  Your return to work may take a few weeks to a few months  ¨ Sexual activities are permitted as tolerated  ¨Hip replacement patients with standard protocol may ride home in a car from the hospital and first ride to office for post-op appointment  NORMAL FINDINGS  Numbness along the incision   Mechanical click of a knee replacement  Your incision area will feel warm  WOUND CARE  ¨ If you have a tegaderm dressing, you should maintain it for 5-7 days post operatively then remove it, or it will be removed by the home nurses  ¨ It is OK to shower with the tegaderm dressing on  Please do not submerge the incision into a pool, bath or hot tub until after your 1st post-operative visit  ¨ Please do not disturb your staples or the scabs  It promotes better healing and smaller scars  ¨ Cover the incision with gauze dressing until there is no further drainage  ¨ You may leave the incision open to the air after removing your dressing  ¨ Please be generous with the use of ice  It is a very good remedy  Apply ice for only twenty minutes at a time  You may use it every hour  It is recommended to ice before and after anticipated activities, which includes exercise and physical therapy  ¨ Wear your knee-high pressure stockings every day  They may be removed for sleep at night  Continue to wear them until you are seen for your first follow up  ¨ Your staples will be removed about two weeks after your surgery date  Home nurses and physical therapists typically remove them  If they are unable to, please call our office to have us do it for you  FOLLOW UP  ¨ You should have a scheduled visit with your surgeon scheduled for three to four weeks after surgery  If you do not remember your appointment date and time, or if you are sure you do not have one, please call to set one up    ¨ Please inform the office if you experience one of the following:  Fever that is not responding to Tylenol and is above 101 degrees   Redness of the skin that is increasing in area   Your incision is soaking the dressings with drainage or blood   You're unable to bear weight on your operative leg or legs

## 2017-12-05 NOTE — PROGRESS NOTES
Postop Note  Total Joint    Doyle Elmo   Patients MRN:  [id number]  Date:  12/5/2017  Postop Check    Assessment/Plan   Active Problems:     Total joint protocol: right knee  Pain control  Continue current treatment    Subjective   · Patient comfortable  · Awake and alert  · Nausea/vomiting Yes    Negative for chest pain and shortness of breath    Objective   · Dressing:  clean and dry  · Drain Patent  · Neurovascularly intact  · Compartments soft, Nontender

## 2017-12-05 NOTE — ANESTHESIA POSTPROCEDURE EVALUATION
Post-Op Assessment Note      CV Status:  Stable    Mental Status:  Alert and awake    Hydration Status:  Euvolemic    PONV Controlled:  Controlled    Airway Patency:  Patent    Post Op Vitals Reviewed: Yes          Staff: Anesthesiologist           /62 (12/05/17 1107)    Temp 97 5 °F (36 4 °C) (Without bear hugger) (12/05/17 1107)    Pulse 56 (12/05/17 1107)   Resp 12 (12/05/17 1107)    SpO2 97 % (12/05/17 1107)

## 2017-12-05 NOTE — OP NOTE
OPERATIVE REPORT  PATIENT NAME: Isreal James    :  1960  MRN: [id number]  Pt Location: [facility] OR ROOM 02    SURGERY DATE: 2017    Surgeon(s) and Role:     * Kulwant Pritchard MD - Primary     * Hailey Reilly PA-C - Assisting    Preop Diagnosis:  Primary osteoarthritis of right knee [M17 11]    Post-Op Diagnosis Codes:     * Primary osteoarthritis of right knee [M17 11]    Procedure(s) (LRB):  ARTHROPLASTY KNEE TOTAL (Right)    Specimen(s):  * No specimens in log *    Estimated Blood Loss:   Minimal     Procedure and Technique:    Alcus Donnie y o     cc: Knee pian  DATE OF PROCEDURE: 17  PREOPERATIVE DIAGNOSIS: Severe degenerative joint disease   POSTOPERATIVE DIAGNOSIS: same  PROCEDURE PERFORMED: Total Knee Replacement right  IMPLANTS:  Vendor Depuy  Type Attune  Femur 7  Tibia 7  PE 7  Patella 35 anatomic    SURGEON: MD Snehal Duarte, [facility]  COMPLICATIONS: None   DRAINS: Medium Hemovac reinfusion  ESTIMATED BLOOD LOSS: Minimal  Tranexamic acid locally bathing the tissues  for postoperative blood management  Injection of Tisseel for postoperative  blood management  Injection of Marcaine, Toradol and epinephrine for  postoperative pain management  ANESTHESIA: spinal  DESCRIPTION OF PROCEDURE: The patient was taken to the operating room on the  day of surgery, identified as the above patient and the extremity was identified as the surgical site  The patient was placed on the operating room table in the supine position  Tourniquet was fashioned around the thigh and a Lacey catheter was placed  The patient received preoperative antibiotics  The patient was then prepped and draped in the usual sterile fashion with Ioban drape placed over the proposed surgical site  With the extremity prepped and draped, the extremity was exsanguinated with an  Esmarch and tourniquet elevated to 300 mmHg  Time out was performed and site marking was identified    Longitudinal incision was made over the anterior aspect of the knee and carried down to the extensor mechanism  A medial parapatellar arthrotomy was created sharply  An extraperiosteal dissection of the medial sleeve was performed  The patella was everted  The knee was brought into hyperflexed position  The anterior cruciate ligament was sacrificed, and the tibia was subluxed anteriorly  The lateral meniscus and 25% of the fat pad was resected en bloc with the posterior cruciate ligament after release of the patellofemoral ligaments  Drill holes were placed in the tibia and the femur and vented with pulsatile lavage irrigant  The tibial jig was placed for adequate resection level  This was perpendicular to the shaft axis of the tibia with anatomic slope  This was checked and found to be perpendicular following osteotomy  Attention was then turned to the femur  Distal femoral resection was an 11mm resection level at distal 5 degree valgus  Slot guide was used in order to reference off the anterior cortex  Sizing was with the use of a Ranawat block  External rotation was judged with lines of Whitesides, transepicondylar axis, and a tension-flexion space  Following rotational considerations, a 4-in-1 cutting block was placed and the femur was cut anterior to posterior, followed by anterior and posterior chamfer cuts  Box-cutting guide finished the femur  A laminar  was used in order to gain access to the posterior aspect of the knee  A combination rongeur and osteotomes were utilized in order to remove posterior osteophytes  Medial meniscal remnant was resected  A trial reduction was performed with appropriate size implants  With implants in place, the patient had a tight flexion gap with full extension  The medial sleeve was tight in full extension with 2 mm of excursion in mid flexion  No lateral instability is encountered  The patella was cut freehand with sagittal saw and finished with a stop-drill   Central tracking of the patella was noted in the trochlear groove with a no-thumbs technique  Trials were removed  The tibia was finished with a drill and Keel punch  Bone blocks were used in order to hold cement mantles  Copious amounts of saline irrigant were used in order to wash out the surgical site  The interstices of bone were cleaned and dried with lap sponges  Cement was placed on the cut surface of the tibia and implant impacted in place  Excess cement removed  Femoral component was placed over cement mantle and impacted in place  Excess cement was then removed  Polyethylene liner was placed  Sequential extension of the knee was performed with removal of cement during sequential extension  The patella was clamped over cement mantle and excess cement removed  With cement cured, any residual cement was removed off the medial and lateral runners  No cement was encountered posteriorly  Central tracking, again, was noted of the patella in the trochlear groove with the no-thumbs technique  The tissues were soaked for approximately 5 minutes at the end of the case while cement was curing with tranexamic acid and saline, in order to decrease the risk of postsurgical bleeding  Following irrigation, the knee was injected in the tibial and femoral metaphyses with Tisseel, in order to gain compressive and biologic hemostasis  The tissues of the periosteum,femur, and medial and lateral posterior capsules were injected with a combination of 30 mL of 0 25% Marcaine with epinephrine and 30 mg of Toradol  for postoperative pain management  Standard closure was performed with   DermaFlex at the level of the skin  5000u SQ heparin used post tourniquet drop due to history of PE  PA was present from the time the patient was taken to the operating room until the time the patient was taken to the postanesthesia care unit   They were helpful in technical aspects of the case including retraction, leg holding, assembly of instruments and jigs, and eventual closure         I was present for the entire procedure    Patient Disposition:  PACU     SIGNATURE: Manpreet Luis MD  DATE: December 5, 2017  TIME: 10:15 AM

## 2017-12-05 NOTE — CASE MANAGEMENT
Initial Clinical Review    Age/Sex: 62 y o  male    Surgery Date:   12/5/2017    Procedure:    Primary osteoarthritis of right knee [M17 11]     Post-Op Diagnosis Codes:     * Primary osteoarthritis of right knee [M17 11]     Procedure(s) (LRB):  ARTHROPLASTY KNEE TOTAL (Right)       Anesthesia:   spinal    Admission Orders: Date/Time/Statement: 12/5/17 @ 1398     Orders Placed This Encounter   Procedures    Inpatient Admission     Standing Status:   Standing     Number of Occurrences:   1     Order Specific Question:   Admitting Physician     Answer:   Savita Dobson     Order Specific Question:   Level of Care     Answer:   Med Surg [16]     Order Specific Question:   Estimated length of stay     Answer:   More than 2 Midnights     Order Specific Question:   Certification     Answer:   I certify that inpatient services are medically necessary for this patient for a duration of greater than two midnights  See H&P and MD Progress Notes for additional information about the patient's course of treatment  Vital Signs: /65   Pulse 62   Temp (!) 96 8 °F (36 °C) (Tympanic Core)   Resp 16   Ht 5' 11" (1 803 m)   Wt 105 kg (232 lb)   SpO2 95%   BMI 32 36 kg/m²     Diet:        Diet Orders            Start     Ordered    12/05/17 1237  Room Service  Once     Question:  Type of Service  Answer:  Room Service-Appropriate    12/05/17 1236    12/05/17 1232  Diet Regular; Regular House  Diet effective now     Question Answer Comment   Diet Type Regular    Regular Regular House    RD to adjust diet per protocol? Yes        12/05/17 1231          Mobility:   As  haley    DVT Prophylaxis:   SCD'S    Pain Control:   Pain Medications             oxyCODONE (ROXICODONE) 5 mg immediate release tablet Starting on 12/6/2017  Take 1-2 tablets by mouth every 4 (four) hours as needed for moderate pain Max Daily Amount: 60 mg    traMADol (ULTRAM) 50 mg tablet Starting on 12/6/2017   Take 1 tablet by mouth every 6 (six) hours as needed for moderate pain        Reg diet  Neurovascular checks  Q 4 hrs  Cons  IM  PT/OT    [address] [facility] in the [facility] by [facility] for 2017  Network Utilization Review Department  Phone: [phone]; Fax [phone]  ATTENTION: The Network Utilization Review Department is now centralized for our 7 Facilities  Make a note that we have a new phone and fax numbers for our Department  Please call with any questions or concerns to [phone] and carefully follow the prompts so that you are directed to the right person  All voicemails are confidential  Fax any determinations, approvals, denials, and requests for initial or continue stay review clinical to [phone]  Due to HIGH CALL volume, it would be easier if you could please send faxed requests to expedite your requests and in part, help us provide discharge notifications faster

## 2017-12-05 NOTE — PERIOPERATIVE NURSING NOTE
Hypotension protocol initiated at this time, blood pressure obtained in other arm with result of 102/62  Dr Sally Fernandez, Anesthesia, made aware

## 2017-12-05 NOTE — PHYSICAL THERAPY NOTE
PHYSICAL THERAPY NOTE          Patient Name: Jana Villanueva  AVGJC'E Date: 12/5/2017 12/05/17 1439   Note Type   Note type Eval/Treat   Pain Assessment   Pain Assessment No/denies pain   Home Living   Type of [address] [facility] Two level; Able to live on main level with bedroom/bathroom;Stairs to enter without rails  (1 JENNY)   Home Equipment (none as per pt )   Additional Comments pt reports living at home with wife and children who are able to assist pt if needed    Prior Function   Level of [place] Independent with ADLs and functional mobility   Lives With Spouse; Family   Receives Help From [facility] in the last 6 months 0   Comments pt denies the use of an AD for ambulation PTA    Restrictions/Precautions   Weight Bearing Precautions Per Order Yes   RLE Weight Bearing Per Order WBAT   Braces or Orthoses (none )   Other Precautions WBS; Multiple lines; Fall Risk;Pain   General   Family/Caregiver Present No   Cognition   Overall Cognitive Status WFL   Arousal/Participation Alert   Orientation Level Oriented to person;Oriented to place;Oriented to time   Memory Within functional limits   Following Commands Follows one step commands without difficulty   RUE Assessment   RUE Assessment WFL   LUE Assessment   LUE Assessment WFL   RLE Assessment   RLE Assessment X  (decreased knee flexion )   LLE Assessment   LLE Assessment WFL   Bed Mobility   Supine to Sit 4  Minimal assistance   Additional items Assist x 1; Increased time required;Verbal cues;LE management   Sit to Supine 4  Minimal assistance   Additional items Assist x 1; Increased time required;Verbal cues;LE management   Transfers   Sit to Stand 4  Minimal assistance   Additional items Assist x 1; Increased time required;Verbal cues   Stand to Sit 4  Minimal assistance   Additional items Assist x 1; Increased time required;Verbal cues   Ambulation/Elevation   Gait pattern Excessively slow; Inconsistent melissa   Gait Assistance 4  Minimal assist   Additional items Assist x 1   Assistive Device Rolling walker   Distance 5ft forward and back   (further ambulation not attempted 2* RLE numbness)   Balance   Static Sitting Fair   Static Standing Fair -   Ambulatory Poor +   Endurance Deficit   Endurance Deficit Yes   Endurance Deficit Description fatigue and pain   Activity Tolerance   Activity Tolerance Patient limited by fatigue;Patient limited by pain   Nurse Made Aware pt able to be seen per Laura Lee RN   Assessment   Prognosis Good   Problem List Decreased strength;Decreased range of motion;Decreased endurance; Impaired balance;Decreased mobility;Orthopedic restrictions;Pain   Assessment Pt is 62 y o  male seen for PT evaluation s/p admit to [facility] on 12/5/2017  Pt s/p R TKA on 12/5/17  Pt was admitted with a primary dx of: R OA, s/p R TKA  PT now consulted for assessment of mobility and d/c needs  Pts current co morbidities effecting treatment include: OA, hx of DVT, and personal factors including JENNY home and steps to manage inside home   Pts current clinical presentation is unstable/ unpredictable due to s/p TKA, current hemovac in place, decreased activity tolerance compared to baseline, increased reliance on AD for ambulation PTA, fall risk   Prior to admission, pt was I with all mobility without the use of an AD  Upon evaluation, pt currently is requiring min A x1  for bed mobility; min A x1  for transfers and min A x1  for ambulation w/ RW   Further ambulation was not assessed this session secondary to decreased sensation in RLE  Pt was encouraged to call nsg when sensation returns if pt would like to sit OOB in chair  Pts BP supine: 118/84, sitting EOB: 118/83, post ambulation 118/81   Pt presents at PT eval functioning below baseline and currently w/ overall mobility deficits 2* to: weakness, decreased ROM, impaired balance, decreased endurance, gait deviations, pain, decreased activity tolerance, fall risk and orthopedic restrictions  Pt currently at a fall risk 2* to impairments listed above  At conclusion of PT session pt was positioned back in chair/bed with phone and call bell within reach  Pt denies any further questions at this time  Pt will continue to benefit from skilled PT interventions to address stated impairments; to maximize functional mobility; for ongoing pt/ family training; and DME needs  PT is currently recommending home PT  Pt/ family agreeable to plan and goals as stated on evaluation  PT will continue to follow during hospital stay  Barriers to Discharge None   Goals   Patient Goals none stated    STG Expiration Date 12/15/17   Short Term Goal #1 In 10 days pt will complete: 1) Bed mobility skills mod I  2) Functional transfers mod I  3) Ambulate 200' using least restrictive AD mod I without LOB and stable vitals  4) Stair training up/ down 2 step/s using rail/s with S  5) Improve balance grades to Good 6) Improve BLE strength by 1/2 grade  7) PT for ongoing pt and family education; DME needs and D/C planning to promote highest level of function in least restrictive environment  Plan   Treatment/Interventions Functional transfer training;LE strengthening/ROM; Elevations; Therapeutic exercise; Endurance training;Equipment eval/education; Bed mobility;Gait training;Spoke to MD;Spoke to nursing   PT Frequency 7x/wk; Twice a day   Recommendation   Recommendation Home PT   Equipment Recommended Walker   PT - OK to Discharge No   Modified Erasmo Scale   Modified McCoy Scale 4   Barthel Index   Feeding 10   Bathing 0   Grooming Score 5   Dressing Score 5   Bladder Score 0   Bowels Score 10   Toilet Use Score 5   Transfers (Bed/Chair) Score 10   Mobility (Level Surface) Score 0   Stairs Score 0   Barthel Index Score 45   Raynal Font, PT

## 2017-12-05 NOTE — PLAN OF CARE
Problem: PHYSICAL THERAPY ADULT  Goal: Performs mobility at highest level of function for planned discharge setting  See evaluation for individualized goals  Treatment/Interventions: Functional transfer training, LE strengthening/ROM, Elevations, Therapeutic exercise, Endurance training, Equipment eval/education, Bed mobility, Gait training, Spoke to MD, Spoke to nursing  Equipment Recommended: Nigel Gonzalez       See flowsheet documentation for full assessment, interventions and recommendations  Prognosis: Good  Problem List: Decreased strength, Decreased range of motion, Decreased endurance, Impaired balance, Decreased mobility, Orthopedic restrictions, Pain  Assessment: Pt is 62 y o  male seen for PT evaluation s/p admit to [facility] on 12/5/2017  Pt s/p R TKA on 12/5/17  Pt was admitted with a primary dx of: R OA, s/p R TKA  PT now consulted for assessment of mobility and d/c needs  Pts current co morbidities effecting treatment include: OA, hx of DVT, and personal factors including JENNY home and steps to manage inside home   Pts current clinical presentation is unstable/ unpredictable due to s/p TKA, current hemovac in place, decreased activity tolerance compared to baseline, increased reliance on AD for ambulation PTA, fall risk   Prior to admission, pt was I with all mobility without the use of an AD  Upon evaluation, pt currently is requiring min A x1  for bed mobility; min A x1  for transfers and min A x1  for ambulation w/ RW   Further ambulation was not assessed this session secondary to decreased sensation in RLE  Pt was encouraged to call nsg when sensation returns if pt would like to sit OOB in chair  Pts BP supine: 118/84, sitting EOB: 118/83, post ambulation 118/81   Pt presents at PT eval functioning below baseline and currently w/ overall mobility deficits 2* to: weakness, decreased ROM, impaired balance, decreased endurance, gait deviations, pain, decreased activity tolerance, fall risk and orthopedic restrictions  Pt currently at a fall risk 2* to impairments listed above  At conclusion of PT session pt was positioned back in chair/bed with phone and call bell within reach  Pt denies any further questions at this time  Pt will continue to benefit from skilled PT interventions to address stated impairments; to maximize functional mobility; for ongoing pt/ family training; and DME needs  PT is currently recommending home PT  Pt/ family agreeable to plan and goals as stated on evaluation  PT will continue to follow during hospital stay  Barriers to Discharge: None     Recommendation: Home PT     PT - OK to Discharge: No    See flowsheet documentation for full assessment

## 2017-12-05 NOTE — ANESTHESIA PROCEDURE NOTES
Spinal Block    Patient location during procedure: OR  Start time: 12/5/2017 8:37 AM  End time: 12/5/2017 8:43 AM  Staffing  Anesthesiologist: Ella Lora  Resident/CRNA: Diego Joseph  Performed: resident/CRNA   Preanesthetic Checklist  Completed: patient identified, site marked, surgical consent, pre-op evaluation, timeout performed, IV checked, risks and benefits discussed and monitors and equipment checked  Spinal Block  Patient position: sitting  Prep: Betadine  Patient monitoring: heart rate, continuous pulse ox and frequent blood pressure checks  Approach: midline  Location: L3-4  Injection technique: single-shot  Needle  Needle type: pencil-tip   Needle gauge: 24 G  Needle length: 5 cm  Assessment  Sensory level: [id number] Assessment:  negative aspiration for heme, no paresthesia on injection and positive aspiration for clear CSF

## 2017-12-05 NOTE — PHYSICAL THERAPY NOTE
PHYSICAL THERAPY EVALUATION          Patient Name: Margaux March  NGZLA'I Date: 12/5/2017 12/05/17 1439   Note Type   Note type Eval/Treat   Pain Assessment   Pain Assessment No/denies pain   Home Living   Type of [address]; Able to live on main level with bedroom/bathroom;Stairs to enter without rails  (1 JNENY)   Home Equipment (none as per pt )   Additional Comments pt reports living at home with wife and children who are able to assist pt if needed    Prior Function   Level of [place] Independent with ADLs and functional mobility   Lives With Spouse; Family   Receives Help From [facility] in the last 6 months 0   Comments pt denies the use of an AD for ambulation PTA    Restrictions/Precautions   Weight Bearing Precautions Per Order Yes   RLE Weight Bearing Per Order WBAT   Braces or Orthoses (none )   Other Precautions WBS; Multiple lines; Fall Risk;Pain   General   Family/Caregiver Present No   Cognition   Overall Cognitive Status WFL   Arousal/Participation Alert   Orientation Level Oriented to person;Oriented to place;Oriented to time   Memory Within functional limits   Following Commands Follows one step commands without difficulty   RUE Assessment   RUE Assessment WFL   LUE Assessment   LUE Assessment WFL   RLE Assessment   RLE Assessment X  (decreased knee flexion )   LLE Assessment   LLE Assessment WFL   Bed Mobility   Supine to Sit 4  Minimal assistance   Additional items Assist x 1; Increased time required;Verbal cues;LE management   Sit to Supine 4  Minimal assistance   Additional items Assist x 1; Increased time required;Verbal cues;LE management   Transfers   Sit to Stand 4  Minimal assistance   Additional items Assist x 1; Increased time required;Verbal cues   Stand to Sit 4  Minimal assistance   Additional items Assist x 1; Increased time required;Verbal cues   Ambulation/Elevation   Gait pattern Excessively slow; Inconsistent melissa   Gait Assistance 4  Minimal assist   Additional items Assist x 1   Assistive Device Rolling walker   Distance 5ft forward and back   (further ambulation not attempted 2* RLE numbness)   Balance   Static Sitting Fair   Static Standing Fair -   Ambulatory Poor +   Endurance Deficit   Endurance Deficit Yes   Endurance Deficit Description fatigue and pain   Activity Tolerance   Activity Tolerance Patient limited by fatigue;Patient limited by pain   Nurse Made Aware pt able to be seen per Markus Dobbs RN   Assessment   Prognosis Good   Problem List Decreased strength;Decreased range of motion;Decreased endurance; Impaired balance;Decreased mobility;Orthopedic restrictions;Pain   Assessment Pt is 62 y o  male seen for PT evaluation s/p admit to [address] on 12/5/2017  Pt s/p R TKA on 12/5/17  Pt was admitted with a primary dx of: R OA, s/p R TKA  PT now consulted for assessment of mobility and d/c needs  Pts current co morbidities effecting treatment include: OA, hx of DVT, and personal factors including JENNY home and steps to manage inside home   Pts current clinical presentation is unstable/ unpredictable due to s/p TKA, current hemovac in place, decreased activity tolerance compared to baseline, increased reliance on AD for ambulation PTA, fall risk   Prior to admission, pt was I with all mobility without the use of an AD  Upon evaluation, pt currently is requiring min A x1  for bed mobility; min A x1  for transfers and min A x1  for ambulation w/ RW   Further ambulation was not assessed this session secondary to decreased sensation in RLE  Pt was encouraged to call nsg when sensation returns if pt would like to sit OOB in chair  Pts BP supine: 118/84, sitting EOB: 118/83, post ambulation 118/81   Pt presents at PT eval functioning below baseline and currently w/ overall mobility deficits 2* to: weakness, decreased ROM, impaired balance, decreased endurance, gait deviations, pain, decreased activity tolerance, fall risk and orthopedic restrictions  Pt currently at a fall risk 2* to impairments listed above  At conclusion of PT session pt was positioned back in chair/bed with phone and call bell within reach  Pt denies any further questions at this time  Pt will continue to benefit from skilled PT interventions to address stated impairments; to maximize functional mobility; for ongoing pt/ family training; and DME needs  PT is currently recommending home PT  Pt/ family agreeable to plan and goals as stated on evaluation  PT will continue to follow during hospital stay  Barriers to Discharge None   Goals   Patient Goals none stated    STG Expiration Date 12/15/17   Short Term Goal #1 In 10 days pt will complete: 1) Bed mobility skills mod I  2) Functional transfers mod I  3) Ambulate 200' using least restrictive AD mod I without LOB and stable vitals  4) Stair training up/ down 2 step/s using rail/s with S  5) Improve balance grades to Good 6) Improve BLE strength by 1/2 grade  7) PT for ongoing pt and family education; DME needs and D/C planning to promote highest level of function in least restrictive environment  Plan   Treatment/Interventions Functional transfer training;LE strengthening/ROM; Elevations; Therapeutic exercise; Endurance training;Equipment eval/education; Bed mobility;Gait training;Spoke to MD;Spoke to nursing   PT Frequency 5x/wk   Recommendation   Recommendation Home PT   Equipment Recommended Walker   PT - OK to Discharge No   Modified Wayne Scale   Modified Erasmo Scale 4   Barthel Index   Feeding 10   Bathing 0   Grooming Score 5   Dressing Score 5   Bladder Score 0   Bowels Score 10   Toilet Use Score 5   Transfers (Bed/Chair) Score 10   Mobility (Level Surface) Score 0   Stairs Score 0   Barthel Index Score 45   Karlie Squibb, PT

## 2017-12-05 NOTE — PERIOPERATIVE NURSING NOTE
Patient moved to PACU holding area at this time  Continuous cardiac/hemodynamic monitoring maintained  Denies pain/nausea

## 2017-12-05 NOTE — PERIOPERATIVE NURSING NOTE
Maria Elena aware of pt's multiple tiny scabbed scratches    Pt states "I had hives that I kept scratching which left these tiny scabbed scratches " Pt c/o of post nasal drip denies chest congestion temperature is normal

## 2017-12-05 NOTE — PHYSICAL THERAPY NOTE
PHYSICAL THERAPY EVALUATION          Patient Name: Swapna THOMASON Date: 12/5/2017 12/05/17 1994   Note Type   Note type Eval/Treat   Pain Assessment   Pain Assessment No/denies pain   Home Living   Type of [address] [facility] Two level; Able to live on main level with bedroom/bathroom;Stairs to enter without rails  (1 JENNY)   Home Equipment (none as per pt )   Additional Comments pt reports living at home with wife and children who are able to assist pt if needed    Prior Function   Level of [place] Independent with ADLs and functional mobility   Lives With Spouse; Family   Receives Help From [facility] in the last 6 months 0   Comments pt denies the use of an AD for ambulation PTA    Restrictions/Precautions   Weight Bearing Precautions Per Order Yes   RLE Weight Bearing Per Order WBAT   Braces or Orthoses (none )   Other Precautions WBS; Multiple lines; Fall Risk;Pain   General   Family/Caregiver Present No   Cognition   Overall Cognitive Status WFL   Arousal/Participation Alert   Orientation Level Oriented to person;Oriented to place;Oriented to time   Memory Within functional limits   Following Commands Follows one step commands without difficulty   RUE Assessment   RUE Assessment WFL   LUE Assessment   LUE Assessment WFL   RLE Assessment   RLE Assessment X  (decreased knee flexion )   LLE Assessment   LLE Assessment WFL   Bed Mobility   Supine to Sit 4  Minimal assistance   Additional items Assist x 1; Increased time required;Verbal cues;LE management   Sit to Supine 4  Minimal assistance   Additional items Assist x 1; Increased time required;Verbal cues;LE management   Transfers   Sit to Stand 4  Minimal assistance   Additional items Assist x 1; Increased time required;Verbal cues   Stand to Sit 4  Minimal assistance   Additional items Assist x 1; Increased time required;Verbal cues   Ambulation/Elevation   Gait pattern Excessively slow; Inconsistent melissa   Gait Assistance 4  Minimal assist   Additional items Assist x 1   Assistive Device Rolling walker   Distance 5ft forward and back   (further ambulation not attempted 2* RLE numbness)   Balance   Static Sitting Fair   Static Standing Fair -   Ambulatory Poor +   Endurance Deficit   Endurance Deficit Yes   Endurance Deficit Description fatigue and pain   Activity Tolerance   Activity Tolerance Patient limited by fatigue;Patient limited by pain   Nurse Made Aware pt able to be seen per Lopez Sidhu RN   Assessment   Prognosis Good   Problem List Decreased strength;Decreased range of motion;Decreased endurance; Impaired balance;Decreased mobility;Orthopedic restrictions;Pain   Assessment Pt is 62 y o  male seen for PT evaluation s/p admit to [facility] on 12/5/2017  Pt s/p R TKA on 12/5/17  Pt was admitted with a primary dx of: R OA, s/p R TKA  PT now consulted for assessment of mobility and d/c needs  Pts current co morbidities effecting treatment include: OA, hx of DVT, and personal factors including JENNY home and steps to manage inside home   Pts current clinical presentation is unstable/ unpredictable due to s/p TKA, current hemovac in place, decreased activity tolerance compared to baseline, increased reliance on AD for ambulation PTA, fall risk   Prior to admission, pt was I with all mobility without the use of an AD  Upon evaluation, pt currently is requiring min A x1  for bed mobility; min A x1  for transfers and min A x1  for ambulation w/ RW   Further ambulation was not assessed this session secondary to decreased sensation in RLE  Pt was encouraged to call nsg when sensation returns if pt would like to sit OOB in chair  Pts BP supine: 118/84, sitting EOB: 118/83, post ambulation 118/81   Pt presents at PT eval functioning below baseline and currently w/ overall mobility deficits 2* to: weakness, decreased ROM, impaired balance, decreased endurance, gait deviations, pain, decreased activity tolerance, fall risk and orthopedic restrictions  Pt currently at a fall risk 2* to impairments listed above  At conclusion of PT session pt was positioned back in chair/bed with phone and call bell within reach  Pt denies any further questions at this time  Pt will continue to benefit from skilled PT interventions to address stated impairments; to maximize functional mobility; for ongoing pt/ family training; and DME needs  PT is currently recommending home PT  Pt/ family agreeable to plan and goals as stated on evaluation  PT will continue to follow during hospital stay  Barriers to Discharge None   Goals   Patient Goals none stated    STG Expiration Date 12/15/17   Short Term Goal #1 In 10 days pt will complete: 1) Bed mobility skills mod I  2) Functional transfers mod I  3) Ambulate 200' using least restrictive AD mod I without LOB and stable vitals  4) Stair training up/ down 2 step/s using rail/s with S  5) Improve balance grades to Good 6) Improve BLE strength by 1/2 grade  7) PT for ongoing pt and family education; DME needs and D/C planning to promote highest level of function in least restrictive environment  Plan   Treatment/Interventions Functional transfer training;LE strengthening/ROM; Elevations; Therapeutic exercise; Endurance training;Equipment eval/education; Bed mobility;Gait training;Spoke to MD;Spoke to nursing   PT Frequency 5x/wk   Recommendation   Recommendation Home PT   Equipment Recommended Walker   PT - OK to Discharge No   Modified Assumption Scale   Modified Erasmo Scale 4   Barthel Index   Feeding 10   Bathing 0   Grooming Score 5   Dressing Score 5   Bladder Score 0   Bowels Score 10   Toilet Use Score 5   Transfers (Bed/Chair) Score 10   Mobility (Level Surface) Score 0   Stairs Score 0   Barthel Index Score 45   Fang Lopez, PT

## 2017-12-06 LAB
ANION GAP SERPL CALCULATED.3IONS-SCNC: 6 MMOL/L (ref 4–13)
BUN SERPL-MCNC: 11 MG/DL (ref 5–25)
CALCIUM SERPL-MCNC: 8.7 MG/DL (ref 8.3–10.1)
CHLORIDE SERPL-SCNC: 108 MMOL/L (ref 100–108)
CO2 SERPL-SCNC: 26 MMOL/L (ref 21–32)
CREAT SERPL-MCNC: 0.75 MG/DL (ref 0.6–1.3)
ERYTHROCYTE [DISTWIDTH] IN BLOOD BY AUTOMATED COUNT: 13.9 % (ref 11.6–15.1)
GFR SERPL CREATININE-BSD FRML MDRD: 102 ML/MIN/1.73SQ M
GLUCOSE SERPL-MCNC: 108 MG/DL (ref 65–140)
HCT VFR BLD AUTO: 35.8 % (ref 36.5–49.3)
HGB BLD-MCNC: 11.8 G/DL (ref 12–17)
INR PPP: 1.09 (ref 0.86–1.16)
MCH RBC QN AUTO: 30.4 PG (ref 26.8–34.3)
MCHC RBC AUTO-ENTMCNC: 33 G/DL (ref 31.4–37.4)
MCV RBC AUTO: 92 FL (ref 82–98)
PLATELET # BLD AUTO: 136 THOUSANDS/UL (ref 149–390)
PMV BLD AUTO: 11.1 FL (ref 8.9–12.7)
POTASSIUM SERPL-SCNC: 4 MMOL/L (ref 3.5–5.3)
PROTHROMBIN TIME: 14.1 SECONDS (ref 12.1–14.4)
RBC # BLD AUTO: 3.88 MILLION/UL (ref 3.88–5.62)
SODIUM SERPL-SCNC: 140 MMOL/L (ref 136–145)
WBC # BLD AUTO: 12.26 THOUSAND/UL (ref 4.31–10.16)

## 2017-12-06 PROCEDURE — 85027 COMPLETE CBC AUTOMATED: CPT | Performed by: PHYSICIAN ASSISTANT

## 2017-12-06 PROCEDURE — 80048 BASIC METABOLIC PNL TOTAL CA: CPT | Performed by: PHYSICIAN ASSISTANT

## 2017-12-06 PROCEDURE — 97110 THERAPEUTIC EXERCISES: CPT

## 2017-12-06 PROCEDURE — 85610 PROTHROMBIN TIME: CPT | Performed by: PHYSICIAN ASSISTANT

## 2017-12-06 PROCEDURE — 97116 GAIT TRAINING THERAPY: CPT

## 2017-12-06 RX ORDER — OXYCODONE HYDROCHLORIDE AND ACETAMINOPHEN 5; 325 MG/1; MG/1
2 TABLET ORAL EVERY 6 HOURS PRN
Status: DISCONTINUED | OUTPATIENT
Start: 2017-12-06 | End: 2017-12-07 | Stop reason: HOSPADM

## 2017-12-06 RX ORDER — WARFARIN SODIUM 5 MG/1
5 TABLET ORAL
Status: COMPLETED | OUTPATIENT
Start: 2017-12-06 | End: 2017-12-06

## 2017-12-06 RX ORDER — FEXOFENADINE HCL 180 MG/1
180 TABLET ORAL
Status: DISCONTINUED | OUTPATIENT
Start: 2017-12-06 | End: 2017-12-07 | Stop reason: HOSPADM

## 2017-12-06 RX ADMIN — DOCUSATE SODIUM 100 MG: 100 CAPSULE, LIQUID FILLED ORAL at 17:28

## 2017-12-06 RX ADMIN — FEXOFENADINE HCL 180 MG: 180 TABLET ORAL at 14:41

## 2017-12-06 RX ADMIN — HYDROMORPHONE HYDROCHLORIDE 1 MG: 1 INJECTION, SOLUTION INTRAMUSCULAR; INTRAVENOUS; SUBCUTANEOUS at 07:41

## 2017-12-06 RX ADMIN — KETOROLAC TROMETHAMINE 30 MG: 30 INJECTION, SOLUTION INTRAMUSCULAR at 22:44

## 2017-12-06 RX ADMIN — SODIUM CHLORIDE, SODIUM LACTATE, POTASSIUM CHLORIDE, AND CALCIUM CHLORIDE 100 ML/HR: .6; .31; .03; .02 INJECTION, SOLUTION INTRAVENOUS at 04:46

## 2017-12-06 RX ADMIN — WARFARIN SODIUM 5 MG: 5 TABLET ORAL at 18:12

## 2017-12-06 RX ADMIN — DOCUSATE SODIUM 100 MG: 100 CAPSULE, LIQUID FILLED ORAL at 10:40

## 2017-12-06 RX ADMIN — OXYCODONE HYDROCHLORIDE 10 MG: 10 TABLET ORAL at 14:49

## 2017-12-06 RX ADMIN — ENOXAPARIN SODIUM 40 MG: 40 INJECTION SUBCUTANEOUS at 10:41

## 2017-12-06 RX ADMIN — OXYCODONE HYDROCHLORIDE 10 MG: 10 TABLET ORAL at 19:01

## 2017-12-06 RX ADMIN — KETOROLAC TROMETHAMINE 30 MG: 30 INJECTION, SOLUTION INTRAMUSCULAR at 11:40

## 2017-12-06 RX ADMIN — KETOROLAC TROMETHAMINE 30 MG: 30 INJECTION, SOLUTION INTRAMUSCULAR at 04:45

## 2017-12-06 RX ADMIN — CELECOXIB 200 MG: 200 CAPSULE ORAL at 10:40

## 2017-12-06 RX ADMIN — KETOROLAC TROMETHAMINE 30 MG: 30 INJECTION, SOLUTION INTRAMUSCULAR at 17:28

## 2017-12-06 NOTE — PHYSICAL THERAPY NOTE
PHYSICAL THERAPY NOTE          Patient Name: Ean Tai  BJSUO'N Date: 12/6/2017 12/06/17 1452   Pain Assessment   Pain Assessment 0-10   Pain Score 8   Pain Type Surgical pain   Pain Location Knee   Pain Orientation Right   Pain Descriptors Aching   Pain Frequency Constant/continuous   Pain Onset Ongoing   Clinical Progression Gradually improving   Effect of Pain on Daily Activities increased pain with activity   Patient's Stated Pain Goal No pain   Hospital Pain Intervention(s) Ambulation/increased activity;Repositioned; Emotional support   Response to Interventions tolerated    Restrictions/Precautions   Weight Bearing Precautions Per Order Yes   RLE Weight Bearing Per Order WBAT   Other Precautions Fall Risk;Pain;Multiple lines   General   Chart Reviewed Yes   Response to Previous Treatment Patient with no complaints from previous session  Family/Caregiver Present Yes  (wife)   Cognition   Overall Cognitive Status WFL   Arousal/Participation Alert   Attention Within functional limits   Orientation Level Oriented to person;Oriented to place;Oriented to time   Memory Within functional limits   Following Commands Follows all commands and directions without difficulty   Subjective   Subjective pt agreeable to PT    Bed Mobility   Sit to Supine 5  Supervision   Additional items Increased time required;Verbal cues   Transfers   Sit to Stand 5  Supervision   Additional items Increased time required;Verbal cues   Stand to Sit 5  Supervision   Additional items Increased time required;Verbal cues   Ambulation/Elevation   Gait pattern Excessively slow; Short stride   Gait Assistance 5  Supervision   Assistive Device Rolling walker   Distance 60ftx2    Balance   Static Sitting Fair +   Static Standing Fair   Ambulatory Fair   Endurance Deficit   Endurance Deficit Yes   Endurance Deficit Description pain and fatigue   Activity Tolerance   Activity Tolerance Patient limited by fatigue;Patient limited by pain   Nurse Made Aware pt able to be seen per Patti Nunez RN   Exercises   Hip Abduction Sitting;10 reps;AAROM; Right  (x 2 sets)   Knee AROM Long Arc Quad Sitting;10 reps;AAROM; Right  (x 2 sets )   Ankle Pumps Supine;10 reps;AROM; Bilateral  (x 2 sets )   Assessment   Prognosis Good   Problem List Decreased strength;Decreased range of motion;Decreased endurance;Decreased mobility;Pain   Assessment Pt tolerated treatment well today  Pt sitting OOB in chair at time of PT treatment  Pt was able to complete all LE therex without any increase in pain  Pt was able to perform all bed mobility and transfers with S this session which is improved compared to previous session  Pt was able to ambulate 60ft x 2 this session, however further ambulation was not attempted secondary to pain  Pt was positioned back in bed at conclusion of PT session  Pt and pts wife were educated on stair negotiation and HEP post treatment  Pts phone and call bell was within reach at end of treatment session  DC recommendation when medically cleared is home PT  PT will continue to follow  Barriers to Discharge None   Goals   Patient Goals to go home    STG Expiration Date 12/15/17   Treatment Day 1   Plan   Treatment/Interventions Functional transfer training;LE strengthening/ROM; Elevations; Therapeutic exercise; Endurance training;Equipment eval/education; Bed mobility;Gait training;Spoke to nursing;Family   Progress Progressing toward goals   PT Frequency 7x/wk; Twice a day   Recommendation   Recommendation Home PT   Equipment Recommended Walker   PT - OK to Discharge Yes  (when medically cleared)   Duc Griffith, PT

## 2017-12-06 NOTE — PROGRESS NOTES
Progress Note - Internal Medicine   Caryn Marquis 62 y o  male MRN: [id number]  Unit/Bed#: E2 -01 Encounter: [id number]      Impression :    POD #1,right total knee replacement by Dr Kerry Rosen  Advanced end-stage degenerative joint disease, right knee  Ambulatory dysfunction  Previous history of DVT and pulmonary embolism following arthroscopic surgery on the right knee many years ago  Anticoagulated with warfarin for DVT prophylaxis/ being overlapped and bridged by Lovenox  Overweight with BMI of 32  Previous history of squamous cell cancer on the face  Recommendation:    · Patient is hemodynamically stable as evident on the flow sheets  Patients  pain is well controlled with current analgesics  · Medically stable for discharge with out patient follow ups as arranged by  once cleared by Therapy and Orthopedic team   · Continue DVT prophylaxis as per surgical team with low-molecular weight heparin bridging with Coumadin  · Monitor for any redness/ drainage / swelling around site of surgery  · Recommend life style modifications and any high impact activities  · Continue PT /OT to improve endurance, range of motion, gait stabilization and use of assist device in a safe manner  · Full fall and orthostatic precautions  Subjective:     Patient seen and examined today  EMR and overnight events reviewed  Patient is resting comfortably not any particular distress  Earlier after he started doing some therapy for little dizzy but that was more orthostatic and partial   Patient has now been started on Lovenox with Coumadin after being cleared by orthopedic team from hemostasis standpoint  He is mild-to-moderate pain rated 4/10 on numeric pain scale in his operated right knee  His catheter drain has been removed by orthopedic team earlier  Discussed with the nursing staff  Review of Systems     Constitutional: Denies appetite change  No chills, diaphoresis, fatigue or fever  HEENT: No congestion, sore throat  No visual complaints  Respiratory: No cough, chest tightness, shortness of breath and wheezing  Cardiovascular: No chest pain and palpitations  No Syncope or grey out  GI: Negative for abdominal distention, pain, constipation, diarrhea or nausea  Endocrine: Negative for cold or heat intolerance, polydipsia and polyuria  Genitourinary: Negative for decreased urine volume, dysuria, flank pain and urgency  Skin: Negative for rash  Neurological: Negative for dizziness, weakness, numbness and headaches  Hematological: Negative for spontaneous bruising or bleeding  Psychiatric: Negative for confusion, dysphoric mood, hallucinations  All other systems reviewed and are negative  OBJECTIVE:     Vitals:     HR:  [47-71] 62  Resp:  [10-18] 18  BP: ()/(58-83) 111/83  SpO2:  [94 %-98 %] 94 %  Temp (24hrs), Av 3 °F (36 3 °C), Min:96 7 °F (35 9 °C), Max:97 8 °F (36 6 °C)  Current: Temperature: 97 8 °F (36 6 °C)    Intake/Output Summary (Last 24 hours) at 17 1004  Last data filed at 17 0741   Gross per 24 hour   Intake          3173 33 ml   Output             4885 ml   Net         -1711 67 ml     Body mass index is 32 36 kg/m²  Physical Exam      General Appearance:  Awake,alert, cooperative  Not in distress  Pallor / Icterus/ Cyanosis negative  Oropharynx no thrush  Tongue protrudes in midline  Head:  Normocephalic, atraumatic  No titubations  Eyes:  No eye redness or discharge bilaterally  Neck: Supple, no raised JVP  Back:   Symmetric, no kypho-scoliosis  Lungs:   B/L Clear to auscultation, no wheezing   Heart:   Regular S1S2, A2P2 normal, no murmur or gallop  Abdomen:   Soft, non-tender,no rebound, bowel sounds+  Musculoskeletal: Extremities no cyanosis or edema  Surgical site on the operated right knee has clean dressing  No discharge or drainage    Mild diminution and limited range of motion   Psych: Normal Affect / No hallucinations or delusions  Neurologic:               Skin:  Endocrine:  Vascular: Awake and alert  Mentation intact  Speech is intact  Facial symmetry is maintained  Good shoulder shrug and hand grasp  Muscle strength is 5/5 in all muscle groups except on operated right lower limb which is limited due to recent surgery  Light touch and temperature sensation is maintained  No rashes /purpura  No open wounds  No thyromegaly / no lid lag  Homans sign is negative  B/L Calf are supple and non tender         Labs, Imaging, & Other studies:    All pertinent labs and imaging studies were personally reviewed    Results from last 7 days  Lab Units 12/06/17  0444   WBC Thousand/uL 12 26*   HEMOGLOBIN g/dL 11 8*   PLATELETS Thousands/uL 136*       Results from last 7 days  Lab Units 12/06/17  0444   SODIUM mmol/L 140   POTASSIUM mmol/L 4 0   CHLORIDE mmol/L 108   CO2 mmol/L 26   ANION GAP mmol/L 6   BUN mg/dL 11   CREATININE mg/dL 0 75   EGFR ml/min/1 73sq m 102   GLUCOSE RANDOM mg/dL 108   CALCIUM mg/dL 8 7     INR increased from 0 94-1 09    Current Meds:  Current Facility-Administered Medications   Medication Dose Route Frequency Provider Last Rate Last Dose    acetaminophen (TYLENOL) tablet 650 mg  650 mg Oral Q6H PRN Miroslava Bent, PA-C        bisacodyl (DULCOLAX) rectal suppository 10 mg  10 mg Rectal Daily PRN Miroslava Bent, PA-C        calcium carbonate (TUMS) chewable tablet 1,000 mg  1,000 mg Oral Daily PRN Miroslava Bent, PA-C        celecoxib (CeleBREX) capsule 200 mg  200 mg Oral Daily Miroslava Bent, PA-C   200 mg at 12/05/17 1425    dexamethasone (DECADRON) injection 8 mg  8 mg Intravenous Once PRN Yamilka Lee DO        diphenhydrAMINE (BENADRYL) injection 50 mg  50 mg Intravenous Q6H PRN Miroslava Bent, PA-C        docusate sodium (COLACE) capsule 100 mg  100 mg Oral BID Miroslava Bent, PA-C   100 mg at 12/05/17 1715    enoxaparin (LOVENOX) subcutaneous injection 40 mg  40 mg Subcutaneous Once Carlen Mood, PA-C        HYDROmorphone (DILAUDID) 1 mg/mL injection 1 mg  1 mg Intravenous Q2H PRN Celia Smiling Lee, DO   1 mg at 12/06/17 0741    [START ON 12/9/2017] ketorolac (TORADOL) 30 mg/mL injection 15 mg  15 mg Intravenous Q6H PRN Carl Mood, PA-C   15 mg at 12/05/17 1716    ketorolac (TORADOL) 30 mg/mL injection 30 mg  30 mg Intravenous Q6H Celia Smijenelle Lee, DO   30 mg at 12/06/17 0445    lactated ringers infusion  100 mL/hr Intravenous Continuous Carlen Mood, PA-C 100 mL/hr at 12/06/17 0446 100 mL/hr at 12/06/17 0446    loratadine (CLARITIN) tablet 10 mg  10 mg Oral Daily Giorgio Browning MD        methocarbamol (ROBAXIN) tablet 500 mg  500 mg Oral Q6H PRN Carl Mood, PA-C   500 mg at 12/05/17 2152    metoclopramide (REGLAN) injection 5 mg  5 mg Intravenous Q6H PRN Amisha Velazquez, DO        morphine injection 2 mg  2 mg Intravenous Q2H PRN Carl Mood, PA-C        naloxone (NARCAN) 0 4 mg/mL injection 0 04 mg  0 04 mg Intravenous Q1MIN PRN Carl Mood, PA-C        naloxone (NARCAN) 0 4 mg/mL injection 0 1 mg  0 1 mg Intravenous Q3 min PRN Celia Smijenelle Lee, DO        ondansetron (ZOFRAN) injection 4 mg  4 mg Intravenous Q8H PRN Carl Mood, PA-C        ondansetron [facility]) injection 4 mg  4 mg Intravenous Q4H PRN Amisha Velazquez, DO        oxyCODONE (ROXICODONE) immediate release tablet 10 mg  10 mg Oral Q4H PRN Carl Mood, PA-C        oxyCODONE (ROXICODONE) IR tablet 5 mg  5 mg Oral Q4H PRN Carl Mood, PA-C        sodium chloride 0 9 % infusion  125 mL/hr Intravenous Continuous Scott Nhung, DO   Stopped at 12/05/17 1041    traMADol (ULTRAM) tablet 50 mg  50 mg Oral Q6H PRN Carl Mood, PA-C        zolpidem (AMBIEN) tablet 5 mg  5 mg Oral HS PRN Miki Lawrence, PA-C         Home Meds:  Prescriptions Prior to Admission   Medication    Fexofenadine HCl (ALLEGRA PO)    Naproxen Sodium (ALEVE PO)       Continuous Infusions:    lactated ringers 100 mL/hr Last Rate: 100 mL/hr (12/06/17 8876)   sodium chloride 125 mL/hr Last Rate: Stopped (12/05/17 1041)       Invasive Devices     Peripheral Intravenous Line            Peripheral IV 12/05/17 Left Forearm 1 day          Drain            Closed/Suction Drain Right;Lateral Knee Accordion 10 Fr  1 day    Urethral Catheter Latex; Double-lumen 16 Fr  1 day                VTE Pharmacologic Prophylaxis:  Combination of Lovenox and Coumadin as per Primary Ortho Team   VTE Mechanical Prophylaxis: sequential compression device    Portions of the record may have been created with voice recognition software  Occasional wrong word or "sound a like" substitutions may have occurred due to the inherent limitations of voice recognition software  Read the chart carefully and recognize, using context, where substitutions have occurred

## 2017-12-06 NOTE — PROGRESS NOTES
Orthopedic Total Joint Progress Note    Assessment/Plan     Graciela Quinteros seen post op day 1  Status post-right knee total joint arthroplasty: Doing well postoperatively  Graciela Quinteros seen in bed  No apparent distress  Vital signs in last 24 hours:  Temp:  [96 7 °F (35 9 °C)-97 6 °F (36 4 °C)] 97 4 °F (36 3 °C)  HR:  [47-71] 57  Resp:  [10-20] 18  BP: ()/(58-77) 98/65      Physical Exam  Dressing C/D/I   + ADF/PF  Pulse intact  Calf soft/non-tender  Sensation intact          Data Review:  CBC:   Lab Results   Component Value Date    WBC 12 26 (H) 12/06/2017    RBC 3 88 12/06/2017    HGB 11 8 (L) 12/06/2017    HCT 35 8 (L) 12/06/2017     (L) 12/06/2017     Lab Results   Component Value Date/Time    INR 1 09 12/06/2017 04:44 AM    INR 0 94 12/05/2017 06:47 AM    INR 0 97 11/21/2017 08:28 AM       Plan:  Discharge Planning  Anticoagulation as ordered  Physical Therapy- WBAT  Continue Medical Management          Zane Drummond MD    Date: 97/3/6436  Time: 8:06 AM

## 2017-12-06 NOTE — PLAN OF CARE
GENITOURINARY - ADULT     Maintains or returns to baseline urinary function Progressing     Absence of urinary retention Progressing        HEMATOLOGIC - ADULT     Maintains hematologic stability Progressing        METABOLIC, FLUID AND ELECTROLYTES - ADULT     Electrolytes maintained within normal limits Progressing     Fluid balance maintained Progressing        MUSCULOSKELETAL - ADULT     Maintain or return mobility to safest level of function Progressing     Maintain proper alignment of affected body part Progressing        Potential for Falls     Patient will remain free of falls Progressing        SKIN/TISSUE INTEGRITY - ADULT     Skin integrity remains intact Progressing     Incision(s), wounds(s) or drain site(s) healing without S/S of infection Progressing

## 2017-12-06 NOTE — PHYSICAL THERAPY NOTE
PHYSICAL THERAPY NOTE          Patient Name: Kenna Duron  TLZIC'T Date: 12/6/2017 12/06/17 8167   Pain Assessment   Pain Assessment 0-10   Pain Score 2   Pain Type Surgical pain   Pain Location Knee   Pain Orientation Right   Pain Descriptors Aching   Pain Frequency Constant/continuous   Pain Onset Ongoing   Clinical Progression Gradually improving   Effect of Pain on Daily Activities increased pain with activity    Patient's Stated Pain Goal No pain   Hospital Pain Intervention(s) Ambulation/increased activity; Emotional support   Response to Interventions tolerated    Restrictions/Precautions   Weight Bearing Precautions Per Order Yes   RLE Weight Bearing Per Order WBAT   Other Precautions Fall Risk;Pain;Multiple lines   General   Chart Reviewed Yes   Response to Previous Treatment Patient with no complaints from previous session  Family/Caregiver Present No   Cognition   Overall Cognitive Status WFL   Arousal/Participation Alert   Attention Within functional limits   Orientation Level Oriented to person;Oriented to place;Oriented to time   Memory Within functional limits   Following Commands Follows one step commands without difficulty   Subjective   Subjective pt agreeable to PT   Bed Mobility   Supine to Sit 4  Minimal assistance   Additional items Increased time required;LE management   Sit to Supine 4  Minimal assistance   Additional items Increased time required;LE management   Transfers   Sit to Stand 4  Minimal assistance   Additional items Assist x 1; Increased time required;Verbal cues   Stand to Sit 4  Minimal assistance   Additional items Increased time required;Verbal cues   Ambulation/Elevation   Gait pattern Excessively slow; Inconsistent melissa   Gait Assistance 4  Minimal assist   Additional items Assist x 1   Assistive Device Rolling walker   Distance 75ft x 2    Stair Management Assistance 4  Minimal assist Additional items Assist x 1   Stair Management Technique Two rails   Number of Stairs 1   Balance   Static Sitting Fair +   Static Standing Fair   Ambulatory Fair -   Endurance Deficit   Endurance Deficit Yes   Endurance Deficit Description fatigue and pain   Activity Tolerance   Activity Tolerance Patient limited by fatigue;Patient limited by pain   Nurse Made Aware pt able to be seen per ns    Exercises   Heelslides Sitting;10 reps;AROM; Right  (x 2 sets )   Hip Abduction Sitting;10 reps;AAROM; Right  (x 2 sets )   Knee AROM Long Arc Quad Sitting;10 reps;AROM; Right  (x 2 sets )   Ankle Pumps Sitting;10 reps;AROM; Bilateral  (x 2 sets )   Assessment   Prognosis Good   Problem List Decreased strength;Decreased range of motion;Decreased endurance; Impaired balance;Decreased mobility;Pain;Orthopedic restrictions   Assessment Pt tolerated treatment well today  Pt was able to perform all LE therex without any increase in pain  Pt was able to perform all bed mobility and transfers min A x 1 which is improved compared to previous session  Pt was able to ambulate 75ft x 2 with RW min A x1 which is improved compared to previous session  Pt agreeable to trial 1 step during treatment session and pt was able to complete with S  Pt was assisted into bathroom at end of treatment session and pt reported feeling dizzy and sweaty when in bathroom  Pt was able to ambulate back to bed and pts BP was taken  BP sitting EOB was 82/60, pt was assisted into supine position and BP was taken in supine: 99/64  [facility] was notified about pts BP and was presents at pts bedside  Once supine pt reported feeling much better  Pt was encouraged to continue with ankle pumps while in bed  Pt was positioned back in supine position position at end of treatment session  Pts phone and call bell was within reach  Pt denies having any further questions and reports feeling like his normal self post treatment  PT will continue to follow   DC recommendation at current time is home PT  Barriers to Discharge None   Goals   Patient Goals to go home    STG Expiration Date 12/15/17   Treatment Day 1   Plan   Treatment/Interventions Functional transfer training;LE strengthening/ROM; Elevations; Therapeutic exercise; Endurance training;Equipment eval/education; Bed mobility;Gait training;Spoke to nursing   Progress Progressing toward goals   PT Frequency 7x/wk; Twice a day   Recommendation   Recommendation Home PT   Equipment Recommended Walker  (cammode)   PT - OK to Discharge Yes  (when medically cleared )   [place], PT

## 2017-12-06 NOTE — PLAN OF CARE
Problem: PHYSICAL THERAPY ADULT  Goal: Performs mobility at highest level of function for planned discharge setting  See evaluation for individualized goals  Treatment/Interventions: Functional transfer training, LE strengthening/ROM, Elevations, Therapeutic exercise, Endurance training, Equipment eval/education, Bed mobility, Gait training, Spoke to MD, Spoke to nursing  Equipment Recommended: [place]       See flowsheet documentation for full assessment, interventions and recommendations  Outcome: Progressing  Prognosis: Good  Problem List: Decreased strength, Decreased range of motion, Decreased endurance, Decreased mobility, Pain  Assessment: Pt tolerated treatment well today  Pt sitting OOB in chair at time of PT treatment  Pt was able to complete all LE therex without any increase in pain  Pt was able to perform all bed mobility and transfers with S this session which is improved compared to previous session  Pt was able to ambulate 60ft x 2 this session, however further ambulation was not attempted secondary to pain  Pt was positioned back in bed at conclusion of PT session  Pt and pts wife were educated on stair negotiation and HEP post treatment  Pts phone and call bell was within reach at end of treatment session  DC recommendation when medically cleared is home PT  PT will continue to follow  Barriers to Discharge: None     Recommendation: Home PT     PT - OK to Discharge: Yes (when medically cleared)    See flowsheet documentation for full assessment

## 2017-12-06 NOTE — OCCUPATIONAL THERAPY NOTE
Occupational Therapy Screen        Patient Name: Mikey Burns  GPKOI'M Date: 12/6/2017      Pt screened for OT services and pt w/ no OT needs warranted at this time  D/C OT services      Ekta Orosco MS, OTR/L

## 2017-12-06 NOTE — PLAN OF CARE
Problem: PHYSICAL THERAPY ADULT  Goal: Performs mobility at highest level of function for planned discharge setting  See evaluation for individualized goals  Treatment/Interventions: Functional transfer training, LE strengthening/ROM, Elevations, Therapeutic exercise, Endurance training, Equipment eval/education, Bed mobility, Gait training, Spoke to MD, Spoke to nursing  Equipment Recommended: [employer]       See flowsheet documentation for full assessment, interventions and recommendations  Outcome: Progressing  Prognosis: Good  Problem List: Decreased strength, Decreased range of motion, Decreased endurance, Impaired balance, Decreased mobility, Pain, Orthopedic restrictions  Assessment: Pt tolerated treatment well today  Pt was able to perform all LE therex without any increase in pain  Pt was able to perform all bed mobility and transfers min A x 1 which is improved compared to previous session  Pt was able to ambulate 75ft x 2 with RW min A x1 which is improved compared to previous session  Pt agreeable to trial 1 step during treatment session and pt was able to complete with S  Pt was assisted into bathroom at end of treatment session and pt reported feeling dizzy and sweaty when in bathroom  Pt was able to ambulate back to bed and pts BP was taken  BP sitting EOB was 82/60, pt was assisted into supine position and BP was taken in supine: 99/64  Nsg was notified about pts BP and was presents at pts bedside  Once supine pt reported feeling much better  Pt was encouraged to continue with ankle pumps while in bed  Pt was positioned back in supine position position at end of treatment session  Pts phone and call bell was within reach  Pt denies having any further questions and reports feeling like his normal self post treatment  PT will continue to follow  DC recommendation at current time is home PT    Barriers to Discharge: None     Recommendation: Home PT     PT - OK to Discharge: Yes (when medically cleared )    See flowsheet documentation for full assessment

## 2017-12-06 NOTE — PROGRESS NOTES
Contacted [facility] regarding pt's coumadin order  Verbal order given for 5mg this evening  Ortho aware of drain site drainage  Reinforced and will continue to monitor

## 2017-12-06 NOTE — CONSULTS
Consultation - Internal Medicine  Isreal James 62 y o  male MRN: [id number]  Unit/Bed#: E2 -01 Encounter: [id number]      Impression :-     62 y o  male patient, who has undergone elective right total knee replacement by Dr Chip Stroud earlier today  Advanced end-stage degenerative joint disease, right knee  Ambulatory dysfunction  Postoperative indwelling Lacey catheter  Previous history of DVT and pulmonary embolism following arthroscopic surgery on the right knee many years ago  Anticoagulated with warfarin for DVT prophylaxis  Overweight with BMI of 32  Previous history of squamous cell cancer on the face  Recommendation: -    Patient seems to be recuperating very well at this stage of his recovery  I met with patient's family members including his wife who is a   We reviewed postoperative course and pain scale with the patient in detail  Currently seems to be well compensated and is not having any problems  He seems to be slightly dry clinically and I have recommended him to increase his oral intake  Patient be placed on postoperative orthopedic hypotension protocol  I have reviewed patients medications as initiated on post operative orders by the primary team  Recommend  monitoring closely for any hypoxemia / respiratory insufficieny related to narcotics and to reduce doses and frequency of narcotics if necessary  Resume patients home medications and I have reviewed them  Maintain Intravenous Fluids for next 24 -48 hours, till patient able to reliably keep meals and meds in  Suggest  Zofran ODT 4 mg sublingually PRN for control of post operative nausea  Patient encouraged to  use Incentive spirometry q 15 minutes while awake to minimize risk of postoperative atelectasis and pneumonia  Patient verbalized to understand and fully comprehend  Recommend DVT prophylaxis with use of Venodyne compression boots    Patient is being started on warfarin for DVT prophylaxis as per orthopedic team for next 6 weeks  Patient having had previous history of DVT and PE is extremely high risk which she fully understands  Target INR should be around 2 0-2 5 hoping that he does not have any bleeding from his surgical site  Recheck his INR daily daily becomes therapeutic  Discontinue patient's Lacey catheter within next 24-48 hours in order  to minimize risk of catheter associated UTI  Please check patient's hemoglobin and hematocrit within next 24 hours to ensure that there is no acute blood loss anemia which would need any blood transfusion  We will follow this pleasant patient with your service closely and recommend necessary changes based on  further hospital course and diagnostics  Thank you, Dr Lucero Senior, for your kind consultation  HISTORY OF PRESENT ILLNESS:    Reason for Consult:  Post operative management per patient was undergone elective right total knee replacement earlier today and has multiple other comorbidities including a significant history of provoked PE and DVT in the past    HPI: Kala Rendon is a 62 y o  male who has suffered with chronic pain in his right knee for a long period of time  He used to enjoy playing baseball and during his teenage years he sustained injury on his right knee resulting in a ACL tear  Subsequent to that he had multiple procedures including arthroscopy done  Over years patient started to have pain and gradually for past few years he has been having increasing dull and achy pain in his right knee  This was limiting his activities of daily living like going to his work, occasional stiffness and effusions  Prolonged sitting and standing was becoming difficult and at times he was unable to climb up and down steps    He had been using frequent anti-inflammatory medications over the counter and then subsequently he was referred by his primary care physician to see orthopedic team   He tried series of steroid injections only with minimal relief and Visco supplementation with short-term relief  Thereafter he was unable to tolerate nonsteroidal anti-inflammatory medications and physical therapy was becoming difficult  He had clinical evaluation done by Dr Brielle Chao and subsequent radiographic evaluation where he was found to have moderate joint narrowing in the knee globally without any fractures  Results also showed subchondral cysts, subchondral sclerosis and joint subluxation with moderate osteophyte formation of the knee joint  Patient was advised that he had developed severe right knee degenerative joint disease and various options including nonsurgical management with conservative treatments worse is more definitive knee replacement were advised by Dr Brielle Chao  Patient was cleared by his family physician and has undergone the surgery without any complications  He is followed by Dr Wandy Deras  I reviewed preoperative medical consultation dated 12/01/2017 which indicates that patient was cleared and recommendation to use anticoagulation to mitigate risk for DVT pain PE was suggested  At present patient is being initiated on warfarin with close monitoring to his INR  Fortunately he does not have any issues of bleeding or any rashes  Review of Systems   Constitutional:  No chills, diaphoresis, fatigue or fever  HEENT:  Negative for congestion, sore throat and tinnitus  Respiratory:  Negative cough, chest tightness, shortness of breath and wheezing  Cardiovascular:  Negative for chest pain and palpitations  Gastrointestinal: Negative for abdominal distention or pain, constipation, diarrhea and nausea  Endocrine: Negative for cold intolerance, heat intolerance, polydipsia and polyuria  Genitourinary:                Negative for  dysuria, flank pain  Tolerating Lacey without difficulty  Skin:   Negative for color change, pallor and rash      Neurological:   Negative for dizziness, tremors, seizures, syncope, facial asymmetry, speech difficulty, weakness, light-headedness, numbness and headaches  Hematological:    Musculoskeletal:  Psychiatric:  History of a provoked DVT in the past when he developed PE and DVT on his operated right leg  He denies any hematemesis rashes or any bleeding from any site  Joint pains as above  Negative for agitation, behavioral problems, confusion, decreased concentration, dysphoric mood and sleep disturbance  A complete system-based review of systems as discussed with patient is otherwise negative  PAST MEDICAL HISTORY:  Past Medical History:   Diagnosis Date    Arthritis     OA right knee    Cancer ([facility] [address] )     SCC cheek in past    Hearing loss     hearing aide denis    History of DVT of lower extremity     RLE    Pulmonary embolism ([facility] [address] ) 1994    Wears glasses     reading     Past Surgical History:   Procedure Laterality Date    COLONOSCOPY      COLONOSCOPY N/A 5/11/2016    Procedure: COLONOSCOPY;  Surgeon: Dayana Ashley MD;  [facility]; Service:     CYST REMOVAL      back    KNEE ARTHROSCOPY Bilateral     KNEE SURGERY Right     SKIN CANCER EXCISION      face, MARU procedure       FAMILY HISTORY:  Brother had history of cancer, mother had history of stroke and diabetes, father had history of heart disease and some orthopedic issues  SOCIAL HISTORY:  History   Alcohol Use No     History   Drug Use No     History   Smoking Status    Current Some Day Smoker    Years: 7 00    Types: Cigars   Smokeless Tobacco    Never Used     Comment: occ cigar   Patient works as a  for a [address]  Patient's wife is a  who was present in the room to give additional details  I also met with patient's 2 children -1 is from [place] and 2nd one is from [facility]      ALLERGIES:  Allergies   Allergen Reactions    Nuts Hives    Soybean-Containing Drug Products Hives    Wheat Bran Hives       MEDICATIONS:  All current active medications have been reviewed    Current Facility-Administered Medications   Medication Dose Route Frequency Provider Last Rate Last Dose    acetaminophen (TYLENOL) tablet 650 mg  650 mg Oral Q6H PRN Namita Deacon PA-C        bisacodyl (DULCOLAX) rectal suppository 10 mg  10 mg Rectal Daily PRN Namita , PA-C        calcium carbonate (TUMS) chewable tablet 1,000 mg  1,000 mg Oral Daily PRN Namita Deacon PA-C        ceFAZolin (ANCEF) IVPB (premix) 1,000 mg  1,000 mg Intravenous Q8H Namita , PA-C 100 mL/hr at 12/05/17 1715 1,000 mg at 12/05/17 1715    celecoxib (CeleBREX) capsule 200 mg  200 mg Oral Daily Namitajo Gonzalez, PA-C   200 mg at 12/05/17 1425    dexamethasone (DECADRON) injection 8 mg  8 mg Intravenous Once PRN Azzie Chanii, DO        diphenhydrAMINE (BENADRYL) injection 50 mg  50 mg Intravenous Q6H PRN Namitajo Gonzalez PA-C        docusate sodium (COLACE) capsule 100 mg  100 mg Oral BID Namitajo Gonzalez, PA-C   100 mg at 12/05/17 1715    HYDROmorphone (DILAUDID) 1 mg/mL injection 1 mg  1 mg Intravenous Q2H PRN Chaz Ask Jesus, DO        [START ON 12/9/2017] ketorolac (TORADOL) 30 mg/mL injection 15 mg  15 mg Intravenous Q6H PRN Namita , PA-C   15 mg at 12/05/17 1716    ketorolac (TORADOL) 30 mg/mL injection 30 mg  30 mg Intravenous Q6H Chaz Ask Jesus, DO   30 mg at 12/05/17 1718    lactated ringers infusion  100 mL/hr Intravenous Continuous Namita Deacon PA-C 100 mL/hr at 12/05/17 1152 100 mL/hr at 12/05/17 1152    loratadine (CLARITIN) tablet 5 mg  5 mg Oral TID AC Chaz Ask Lee, DO        methocarbamol (ROBAXIN) tablet 500 mg  500 mg Oral Q6H PRN Namita , PA-C        metoclopramide (REGLAN) injection 5 mg  5 mg Intravenous Q6H PRN Chaz Hannah Lee DO        [START ON 12/6/2017] morphine injection 2 mg  2 mg Intravenous Q2H PRN Namita Gonzalez PA-C        multivitamin-minerals (CENTRUM) tablet 1 tablet  1 tablet Oral Daily Namita Gonzalez PA-C   1 tablet at 12/05/17 [facility] ON 2017] naloxone (NARCAN) 0 4 mg/mL injection 0 04 mg  0 04 mg Intravenous Q1MIN PRN Sally Orlando PA-C        naloxone (NARCAN) 0 4 mg/mL injection 0 1 mg  0 1 mg Intravenous Q3 min PRN Stephany Lee DO        NON FORMULARY  180-360 mg Oral TID AC Sally Orlando PA-C   Stopped at 17 1718    [START ON 2017] ondansetron (ZOFRAN) injection 4 mg  4 mg Intravenous Q8H PRN Sally Orlando PA-C        [facility]dansetron [facility] injection 4 mg  4 mg Intravenous Q4H PRN Stephany Lee DO        [START ON 2017] oxyCODONE (ROXICODONE) immediate release tablet 10 mg  10 mg Oral Q4H PRN Sally Orlando PA-C        [START ON 2017] oxyCODONE (ROXICODONE) IR tablet 5 mg  5 mg Oral Q4H PRN Sally Orlando PA-C        sodium chloride 0 9 % infusion  125 mL/hr Intravenous Continuous Cleatis Aye, DO   Stopped at 17 1041    [START ON 2017] traMADol (ULTRAM) tablet 50 mg  50 mg Oral Q6H PRN Sally Orlando PA-C        [START ON 2017] zolpidem (AMBIEN) tablet 5 mg  5 mg Oral HS PRN Sally Orlando PA-C           Prescriptions Prior to Admission   Medication    Fexofenadine HCl (ALLEGRA PO)    Naproxen Sodium (ALEVE PO)           PHYSICAL EXAM:    Vitals:  HR:  [47-71] 54  Resp:  [10-20] 18  BP: ()/(58-81) 124/74  SpO2:  [95 %-98 %] 98 %  Temp (24hrs), Av 2 °F (36 2 °C), Min:96 7 °F (35 9 °C), Max:97 8 °F (36 6 °C)  Current: Temperature: (!) 97 °F (36 1 °C)  Body mass index is 32 36 kg/m²  General Appearance:    Awake, alert, cooperative, not in any distress, appears of          stated age  In bed with family members in room visiting him  Head:    Normocephalic, without obvious abnormality, atraumatic   Eyes:    Pupils equal in size,conjunctiva/corneas clear, EOM's intact  Nose:   No evidence of epistaxis/ discharge from nares  Throat:   Lips, mucosa, and tongue normal/ slightly dry  Neck:   Supple, symmetrical, trachea midline, no JVP     Back:     Symmetric, no curvature, no CVA tenderness   Lungs:     Bilateral air entry is broncho-alveolar and equal        No crepitation or rales  No pleural rub  Heart:    Regular rate and rhythm, S1S2 normal, no murmur, rub  Abdomen:     Soft, non-tender, no masses, no organomegaly   Genitalia:    Indwelling Lacey catheter  Clear urine +, No hematuria  Musculoskeletal:   Extremities appear normal, atraumatic, no cyanosis or edema  Surgical site on the operated right knee has clear dressing / no bleeding or hemorrhage apparent  One surgical drain with mild collection of blood is noted  Vascular:   2+ in Posterior tibialis / dorsalis pedis  Skin:   Skin color, texture, turgor normal, no rashes or lesions   Neurologic:   Oriented/ Awake/ facial symmetry maintained  Speech is intact  Muscle bulk and strength is equivocal in B/L Upper and lower extremities except on operated right lower limb  Light sensation is intact B/l LE  B/L [place] flexion is WNL  LABS, IMAGING, & OTHER STUDIES:  Lab Results:  I have personally reviewed pertinent labs  Lab Results   Component Value Date     11/21/2017     11/21/2017    CO2 25 11/21/2017    ANIONGAP 10 11/21/2017    BUN 16 11/21/2017    CREATININE 0 93 11/21/2017    EGFR 91 11/21/2017    GLUCOSE 95 11/21/2017    CALCIUM 9 0 11/21/2017    AST 17 11/21/2017    ALT 26 11/21/2017    ALKPHOS 68 11/21/2017    PROT 6 8 11/21/2017    ALBUMIN 3 8 11/21/2017    BILITOT 0 63 11/21/2017     Lab Results   Component Value Date    WBC 5 28 11/21/2017    WBC 6 27 01/27/2017    HGB 14 5 11/21/2017    HGB 15 1 01/27/2017     11/21/2017     01/27/2017       Lab Results   Component Value Date    INR 0 94 12/05/2017    INR 0 97 11/21/2017    INR 0 94 01/27/2017         Imaging Studies:   I have personally reviewed pertinent imaging study reports  Imaging:     Xr Chest Pa & Lateral    Result Date: 11/22/2017  Narrative: CHEST - DUAL ENERGY INDICATION:  Preop    Right knee osteoarthritis COMPARISON:  None VIEWS:  PA (including soft tissue/bone algorithms) and lateral projections IMAGES:  5 FINDINGS:     Cardiomediastinal silhouette appears unremarkable  The lungs are clear  No pneumothorax or pleural effusion  Mild degenerative changes in dextroscoliosis, thoracolumbar spine     Impression: No active pulmonary disease  Workstation performed: [id number]     Xr Knee 1 Or 2 Vw Right    Result Date: 12/5/2017  Narrative: RIGHT KNEE INDICATION:  Follow up surgery  COMPARISON: None VIEWS:  AP and lateral IMAGES:  2 FINDINGS: Total knee arthroplasty appears in satisfactory position  No evidence of hardware failure  There is no acute fracture or dislocation  There is no joint effusion  No lytic or blastic lesions are seen  There is a surgical drain and soft tissue gas in the anterior aspect of the knee, which are expected immediate postoperative findings  Impression: Unremarkable appearance of total knee arthroplasty  Workstation performed: [id number]       EKG, Pathology, and Other Studies:   I have personally reviewed pertinent reports  Ref Range & Units 11/21/17 0819   Ventricular Rate BPM 51    Atrial Rate BPM 51    HI Interval ms 154    QRSD Interval ms 96    QT Interval ms 420    QTC Interval ms 387    P Axis degrees 41    QRS Axis degrees 4    T Wave Axis degrees 39    Narrative     Sinus bradycardia  Otherwise normal ECG  No previous ECGs available                 Rest/Stress SPECT Myocardial Perfusion Imaging After Exercise      INDICATIONS: Detection of coronary artery disease      REST ECG: Sinus bradycardia      PROCEDURE: The study was performed at the [address] [place] [address] and [facility] [place]  Treadmill exercise testing was performed, using the [place] protocol  SPECT  myocardial perfusion imaging was performed during stress and at rest  Systolic  blood pressure was 122 mmHg, at the start of the study  Diastolic blood  pressure was 82 mmHg, at the start of the study   The heart rate was 55 bpm, at  the start of the study  IV double checked      GARRY PROTOCOL:  HR bpm SBP mmHg DBP mmHg Symptoms  Baseline 55 122 82 none  Stage 1 92 126 70 none  Stage 2 114 136 70 mild dyspnea  Stage 3 141 144 80 moderate dyspnea, fatigue  Recovery 1 64 156 72 subsiding  Recovery 2 93 126 66 none  No medications or fluids given      STRESS SUMMARY: Duration of exercise was 8 min and 50 sec  The patient  exercised to protocol stage 3  Maximal work rate was 10 4 METs  Maximal heart  rate during stress was 141 bpm ( 85 % of maximal predicted heart rate)  The  heart rate response to stress was normal  There was normal resting blood  pressure with an appropriate response to stress  The rate-pressure product for  the peak heart rate and blood pressure was 83017  There was no chest pain  during stress  The stress test was terminated due to achievement of maximal  (symptom limited) exercise and achievement of target heart rate  Pre oxygen  saturation: 98 %  Peak oxygen saturation: 98 %  The stress ECG was negative for  ischemia  There were no stress arrhythmias or conduction abnormalities      ISOTOPE ADMINISTRATION:  Resting isotope administration Stress isotope administration  Agent Tetrofosmin Tetrofosmin  Dose 10 1 mCi 31 4 mCi  Date 02/16/2016 02/16/2016  Injection time 08:16 09:59  Imaging time 08:50 10:29  Injection-image interval 40 min 30 min     Radiopharmaceutical was administered 8 min into the stress protocol  There was  50 sec of exercise after the injection      MYOCARDIAL PERFUSION IMAGING:  The image quality was good  Left ventricular size was normal  The TID ratio was  1  12      PERFUSION DEFECTS:  -  There were no perfusion defects      GATED SPECT:  The calculated left ventricular ejection fraction was 60 %  Left ventricular  ejection fraction was within normal limits by visual estimate   There was no  left ventricular regional abnormality      SUMMARY:  -  Stress results: Duration of exercise was 8 min and 50 sec  Target heart rate  was achieved  There was no chest pain during stress  -  ECG conclusions: The stress ECG was negative for ischemia  -  Perfusion imaging: There were no perfusion defects   -  Gated SPECT: The calculated left ventricular ejection fraction was 60 %  Left ventricular ejection fraction was within normal limits by visual estimate  There was no left ventricular regional abnormality      IMPRESSIONS: Normal study after maximal exercise without reproduction of  symptoms  Myocardial perfusion imaging was normal at rest and with stress  Left  ventricular systolic function was normal          Portions of the record may have been created with voice recognition software  Occasional wrong word or "sound a like" substitutions may have occurred due to the inherent limitations of voice recognition software  Read the chart carefully and recognize, using context, where substitutions have occurred

## 2017-12-07 VITALS
BODY MASS INDEX: 32.48 KG/M2 | WEIGHT: 232 LBS | HEIGHT: 71 IN | SYSTOLIC BLOOD PRESSURE: 121 MMHG | TEMPERATURE: 98.6 F | RESPIRATION RATE: 16 BRPM | HEART RATE: 60 BPM | OXYGEN SATURATION: 96 % | DIASTOLIC BLOOD PRESSURE: 70 MMHG

## 2017-12-07 LAB
ANION GAP SERPL CALCULATED.3IONS-SCNC: 6 MMOL/L (ref 4–13)
BUN SERPL-MCNC: 13 MG/DL (ref 5–25)
CALCIUM SERPL-MCNC: 8.3 MG/DL (ref 8.3–10.1)
CHLORIDE SERPL-SCNC: 102 MMOL/L (ref 100–108)
CO2 SERPL-SCNC: 26 MMOL/L (ref 21–32)
CREAT SERPL-MCNC: 0.77 MG/DL (ref 0.6–1.3)
GFR SERPL CREATININE-BSD FRML MDRD: 101 ML/MIN/1.73SQ M
GLUCOSE SERPL-MCNC: 106 MG/DL (ref 65–140)
INR PPP: 1.16 (ref 0.86–1.16)
POTASSIUM SERPL-SCNC: 3.8 MMOL/L (ref 3.5–5.3)
PROTHROMBIN TIME: 14.8 SECONDS (ref 12.1–14.4)
SODIUM SERPL-SCNC: 134 MMOL/L (ref 136–145)

## 2017-12-07 PROCEDURE — 80048 BASIC METABOLIC PNL TOTAL CA: CPT | Performed by: PHYSICIAN ASSISTANT

## 2017-12-07 PROCEDURE — 97110 THERAPEUTIC EXERCISES: CPT

## 2017-12-07 PROCEDURE — 97116 GAIT TRAINING THERAPY: CPT

## 2017-12-07 PROCEDURE — 85610 PROTHROMBIN TIME: CPT | Performed by: PHYSICIAN ASSISTANT

## 2017-12-07 RX ORDER — DOCUSATE SODIUM 100 MG/1
100 CAPSULE, LIQUID FILLED ORAL 2 TIMES DAILY PRN
Qty: 10 CAPSULE | Refills: 0
Start: 2017-12-07 | End: 2018-02-19 | Stop reason: SDUPTHER

## 2017-12-07 RX ORDER — ACETAMINOPHEN 325 MG/1
325 TABLET ORAL EVERY 4 HOURS PRN
Qty: 30 TABLET | Refills: 0
Start: 2017-12-07 | End: 2018-02-19 | Stop reason: SDUPTHER

## 2017-12-07 RX ORDER — WARFARIN SODIUM 2 MG/1
6 TABLET ORAL
Start: 2017-12-07 | End: 2018-02-19 | Stop reason: SDUPTHER

## 2017-12-07 RX ORDER — CELECOXIB 200 MG/1
200 CAPSULE ORAL DAILY
Refills: 0
Start: 2017-12-08 | End: 2018-02-19 | Stop reason: SDUPTHER

## 2017-12-07 RX ORDER — FEXOFENADINE HCL 180 MG/1
180 TABLET ORAL DAILY
COMMUNITY
End: 2018-02-19 | Stop reason: SDUPTHER

## 2017-12-07 RX ORDER — WARFARIN SODIUM 6 MG/1
6 TABLET ORAL
Status: COMPLETED | OUTPATIENT
Start: 2017-12-07 | End: 2017-12-07

## 2017-12-07 RX ADMIN — DOCUSATE SODIUM 100 MG: 100 CAPSULE, LIQUID FILLED ORAL at 08:16

## 2017-12-07 RX ADMIN — OXYCODONE HYDROCHLORIDE 5 MG: 5 TABLET ORAL at 13:57

## 2017-12-07 RX ADMIN — CELECOXIB 200 MG: 200 CAPSULE ORAL at 08:15

## 2017-12-07 RX ADMIN — KETOROLAC TROMETHAMINE 30 MG: 30 INJECTION, SOLUTION INTRAMUSCULAR at 13:01

## 2017-12-07 RX ADMIN — KETOROLAC TROMETHAMINE 30 MG: 30 INJECTION, SOLUTION INTRAMUSCULAR at 05:02

## 2017-12-07 RX ADMIN — DOCUSATE SODIUM 100 MG: 100 CAPSULE, LIQUID FILLED ORAL at 17:41

## 2017-12-07 RX ADMIN — WARFARIN SODIUM 6 MG: 6 TABLET ORAL at 17:41

## 2017-12-07 RX ADMIN — OXYCODONE HYDROCHLORIDE 5 MG: 5 TABLET ORAL at 08:15

## 2017-12-07 RX ADMIN — METHOCARBAMOL 500 MG: 500 TABLET ORAL at 17:41

## 2017-12-07 RX ADMIN — OXYCODONE HYDROCHLORIDE 10 MG: 10 TABLET ORAL at 01:36

## 2017-12-07 RX ADMIN — METHOCARBAMOL 500 MG: 500 TABLET ORAL at 10:50

## 2017-12-07 RX ADMIN — FEXOFENADINE HCL 180 MG: 180 TABLET ORAL at 06:05

## 2017-12-07 RX ADMIN — TRAMADOL HYDROCHLORIDE 50 MG: 50 TABLET, FILM COATED ORAL at 11:56

## 2017-12-07 RX ADMIN — FEXOFENADINE HCL 180 MG: 180 TABLET ORAL at 11:56

## 2017-12-07 RX ADMIN — ENOXAPARIN SODIUM 40 MG: 40 INJECTION SUBCUTANEOUS at 16:15

## 2017-12-07 NOTE — PROGRESS NOTES
Progress Note - Internal Medicine   Jackie Flores 62 y o  male MRN: [id number]  Unit/Bed#: E2 -01 Encounter: [id number]      Impression :  Postoperative day 2, s/p right knee replacement  Advanced DJD right knee, failed conservative treatments  Anticoagulated with warfarin due to previous history of PE/DVT provoked following arthroscopic surgery  Subtherapeutic INR - 1 16  Overweight with BMI of 32  Ambulatory dysfunction  History of multiple food allergies  Mild hyponatremia due to poor intake  Recommendation:  Patient seems to be recuperating well and compensated with his pain  He can benefit with additional pain medications  He appears to be slightly tired perhaps getting some more rest after therapy sessions  I have recommended him to drink plenty full of fluids due to his low sodium likely due to admitted intake  Patient has underlying history of provoked DVT and pulmonary embolism when he had a previous arthroscopic surgery done  Patient may benefit with bridging with Lovenox until he becomes therapeutic with warfarin  Patient's renal functions are within normal limit, he can have 40 mg of subcutaneous Lovenox daily and that can be discontinued once his INR is in the therapeutic range between 2 0- 2 2  If patient is still here in the hospital tomorrow then recheck his sodium, otherwise he has been advised to increase his fluid intake  Subjective:   Patient seen and examined today  EMR and overnight events reviewed  Resting supine in bed not in any distress  Wife is in the room  Patient appears little tired, denies any new complaints  Mild pain in his operated right knee surgical site  There is no evidence of any bleeding  Patient has had no problem with his changes to his antihistamines  Confirmed with the wife and patient regarding the dosing  Patient was incorrectly taking 180 mg of fexofenadine 3 times a day which in fact is only to be used once a daily      Review of Systems Constitutional: Low appetite  No chills, diaphoresis, fatigue or fever  HEENT: No congestion, sore throat  No visual complaints  Respiratory: No cough, chest tightness, shortness of breath and wheezing  Cardiovascular: No chest pain and palpitations  No Syncope or grey out  GI: Negative for abdominal distention, pain, constipation, diarrhea or nausea  Skin: Negative for rash  Neurological: Negative for dizziness, weakness, numbness and headaches  Hematological: Negative for spontaneous bruising or bleeding  Psychiatric: Negative for confusion, dysphoric mood, hallucinations  All other systems reviewed and are negative  OBJECTIVE:     Vitals:     HR:  [52-69] 63  Resp:  [16] 16  BP: (101-118)/(70-81) 109/81  SpO2:  [94 %-98 %] 97 %  Temp (24hrs), Av 2 °F (36 8 °C), Min:97 6 °F (36 4 °C), Max:98 7 °F (37 1 °C)  Current: Temperature: 97 6 °F (36 4 °C)    Intake/Output Summary (Last 24 hours) at 17 1510  Last data filed at 17 1348   Gross per 24 hour   Intake                0 ml   Output             4475 ml   Net            -4475 ml     Body mass index is 32 36 kg/m²  Physical Exam      General Appearance:  Awake,alert, cooperative  Not in distress  Pallor / Icterus/ Cyanosis negative  Oropharynx no thrush  Tongue protrudes in midline  Head:  Normocephalic, atraumatic  No titubations  Eyes:  No eye redness or discharge bilaterally  Neck: Supple, no raised JVP  Back:   Symmetric, no kypho-scoliosis  Lungs:   B/L Clear to auscultation, no wheezing or rhonchi  Normal broncho-alveolar air entry  No pleural rubs  Heart:   Regular S1S2, A2P2 normal, no murmur or gallop  Abdomen:   Soft, non-tender,no rebound, bowel sounds+  Musculoskeletal: Extremities no cyanosis or edema  Surgical site on the operated right knee has clean dressing  No discharge or drainage    Mild diminution and limited range of motion   Psych: Normal Affect / No hallucinations or delusions  Neurologic:             Skin:  Endocrine:  Vascular: Awake and alert  Mentation intact  Speech is intact  Facial symmetry is maintained  Good shoulder shrug and hand grasp  Muscle strength is 5/5 in all muscle groups except on operated right lower limb which is limited due to recent surgery  Light touch and temperature sensation is maintained  No rashes /purpura  No open wounds  No thyromegaly / no lid lag  Homans sign is negative  B/L Calf are supple and non tender         Labs, Imaging, & Other studies:    All pertinent labs and imaging studies were personally reviewed    Results from last 7 days  Lab Units 12/06/17  0444   WBC Thousand/uL 12 26*   HEMOGLOBIN g/dL 11 8*   PLATELETS Thousands/uL 136*       Results from last 7 days  Lab Units 12/07/17  0607 12/06/17  0444   SODIUM mmol/L 134* 140   POTASSIUM mmol/L 3 8 4 0   CHLORIDE mmol/L 102 108   CO2 mmol/L 26 26   ANION GAP mmol/L 6 6   BUN mg/dL 13 11   CREATININE mg/dL 0 77 0 75   EGFR ml/min/1 73sq m 101 102   GLUCOSE RANDOM mg/dL 106 108   CALCIUM mg/dL 8 3 8 7           Current Meds:  Current Facility-Administered Medications   Medication Dose Route Frequency Provider Last Rate Last Dose    acetaminophen (TYLENOL) tablet 650 mg  650 mg Oral Q6H PRN Dorcas Cheadle, PA-C        bisacodyl (DULCOLAX) rectal suppository 10 mg  10 mg Rectal Daily PRN Dorcas Cheadle, PA-C        calcium carbonate (TUMS) chewable tablet 1,000 mg  1,000 mg Oral Daily PRN Dorcas Cheadle, PA-C        celecoxib (CeleBREX) capsule 200 mg  200 mg Oral Daily Dorcas Cheadle, PA-C   200 mg at 12/07/17 0815    diphenhydrAMINE (BENADRYL) injection 50 mg  50 mg Intravenous Q6H PRN Dorcas Cheadle, PA-C        docusate sodium (COLACE) capsule 100 mg  100 mg Oral BID Dorcas Cheadle, PA-C   100 mg at 12/07/17 0816    fexofenadine (ALLEGRA) tablet 180 mg  180 mg Oral TID AC Tg Mack MD   180 mg at 12/07/17 1156    HYDROmorphone (DILAUDID) 1 mg/mL injection 1 mg  1 mg Intravenous Q2H PRN María Lee, DO   1 mg at 12/06/17 0741    [START ON 12/9/2017] ketorolac (TORADOL) 30 mg/mL injection 15 mg  15 mg Intravenous Q6H PRN Morena Love PA-C   15 mg at 12/05/17 1716    ketorolac (TORADOL) 30 mg/mL injection 30 mg  30 mg Intravenous Q6H María Lee, DO   30 mg at 12/07/17 1301    lactated ringers infusion  100 mL/hr Intravenous Continuous Morena Love, PA-C   Stopped at 12/07/17 6616    loratadine (CLARITIN) tablet 10 mg  10 mg Oral Daily Cinthya Smith MD        methocarbamol (ROBAXIN) tablet 500 mg  500 mg Oral Q6H PRN Morena Love PA-C   500 mg at 12/07/17 1050    morphine injection 2 mg  2 mg Intravenous Q2H PRN Morena Love PA-C        naloxone (NARCAN) 0 4 mg/mL injection 0 04 mg  0 04 mg Intravenous Q1MIN PRN Morena Love PA-C        ondansetron [facility]) injection 4 mg  4 mg Intravenous Q8H PRN Morena Love PA-C        oxyCODONE (ROXICODONE) immediate release tablet 10 mg  10 mg Oral Q4H PRN Morena Love PA-C   10 mg at 12/07/17 0136    oxyCODONE (ROXICODONE) IR tablet 5 mg  5 mg Oral Q4H PRN Morena Love PA-C   5 mg at 12/07/17 1357    oxyCODONE-acetaminophen (PERCOCET) 5-325 mg per tablet 2 tablet  2 tablet Oral Q6H PRN Eliverto No, DO        sodium chloride 0 9 % infusion  125 mL/hr Intravenous Continuous Subha Pies, DO   Stopped at 12/05/17 1041    traMADol (ULTRAM) tablet 50 mg  50 mg Oral Q6H PRN Morena Love PA-C   50 mg at 12/07/17 1156    warfarin (COUMADIN) tablet 6 mg  6 mg Oral Once (warfarin) Morena Love PAAaronC        zolpidem (AMBIEN) tablet 5 mg  5 mg Oral HS PRN Morena Love PA-C         Home Meds:  Prescriptions Prior to Admission   Medication    Fexofenadine HCl (ALLEGRA PO)    Naproxen Sodium (ALEVE PO)       Continuous Infusions:    lactated ringers 100 mL/hr Last Rate: Stopped (12/07/17 0721)   sodium chloride 125 mL/hr Last Rate: Stopped (12/05/17 1041)       Invasive Devices     Peripheral Intravenous Line Peripheral IV 12/05/17 Left Forearm 2 days                VTE Pharmacologic Prophylaxis: Warfarin (Coumadin) as per Primary Ortho Team   VTE Mechanical Prophylaxis: sequential compression device    Portions of the record may have been created with voice recognition software  Occasional wrong word or "sound a like" substitutions may have occurred due to the inherent limitations of voice recognition software  Read the chart carefully and recognize, using context, where substitutions have occurred

## 2017-12-07 NOTE — PLAN OF CARE
Problem: PHYSICAL THERAPY ADULT  Goal: Performs mobility at highest level of function for planned discharge setting  See evaluation for individualized goals  Treatment/Interventions: Functional transfer training, LE strengthening/ROM, Elevations, Therapeutic exercise, Endurance training, Equipment eval/education, Bed mobility, Gait training, Spoke to MD, Spoke to nursing  Equipment Recommended: Geetha Ayoub       See flowsheet documentation for full assessment, interventions and recommendations  Outcome: Progressing  Prognosis: Good  Problem List: Decreased strength, Decreased range of motion, Decreased endurance, Decreased mobility, Pain, Orthopedic restrictions  Assessment: Pt tolerated treatment well  Pt was able to tolerated all LE therex without any increase in R LE pain  Pt continues to require verbal instruction for proper form  Pt was able to perform all bed mobility min A x 1 with LE management which is improved since previous session  Pt was able to perform all transfer with S this session as well as ambulation with S which is improved compared to previous session  Pt continues to report increased pain with ambulation  Pt was able to perform stair negotiation this session with S  Pt was positioned back in bed post session with phone and call bell within reach  Pt unable to sit OOB in chair at this time  Pt was encouraged to continue to sit OOB in chair as much as tolerated  PT will continue to follow  DC recommendation when medically cleared is home PT  Barriers to Discharge: None     Recommendation: Home PT     PT - OK to Discharge: Yes (when medically cleared )    See flowsheet documentation for full assessment

## 2017-12-07 NOTE — PHYSICAL THERAPY NOTE
PHYSICAL THERAPY NOTE          Patient Name: Swapna Mcrae  [id number] Date: 12/7/2017 12/07/17 1420   Pain Assessment   Pain Assessment 0-10   Pain Score 4   Pain Type Surgical pain   Pain Location Knee   Pain Orientation Right   Pain Descriptors Aching   Pain Frequency Constant/continuous   Pain Onset Ongoing   Clinical Progression Gradually improving   Effect of Pain on Daily Activities increased pain with activity    Patient's Stated Pain Goal No pain   Hospital Pain Intervention(s) Medication (See MAR); Ambulation/increased activity;Repositioned   Response to Interventions tolerated    Restrictions/Precautions   Weight Bearing Precautions Per Order Yes   RLE Weight Bearing Per Order WBAT   Other Precautions Pain   General   Chart Reviewed Yes   Response to Previous Treatment Patient with no complaints from previous session  Family/Caregiver Present Yes   Cognition   Overall Cognitive Status WFL   Arousal/Participation Alert   Attention Within functional limits   Orientation Level Oriented to person;Oriented to place;Oriented to time   Memory Within functional limits   Following Commands Follows all commands and directions without difficulty   Subjective   Subjective pt agrees to PT    Bed Mobility   Supine to Sit 4  Minimal assistance   Additional items Increased time required;LE management   Sit to Supine 5  Supervision   Transfers   Sit to Stand 6  Modified independent   Stand to Sit 6  Modified independent   Ambulation/Elevation   Gait pattern Excessively slow; Short stride; Inconsistent melissa   Gait Assistance 5  Supervision   Assistive Device Rolling walker   Distance 100ft x 2    Stair Management Assistance 5  Supervision   Additional items Verbal cues   Stair Management Technique One rail R   Number of Stairs 4   Balance   Static Sitting Fair +   Static Standing Fair   Ambulatory Fair   Endurance Deficit   Endurance Deficit Yes   Endurance Deficit Description pain   Activity Tolerance   Activity Tolerance Patient limited by fatigue;Patient limited by pain   Nurse Made Aware pt able to be seen per nsg   Exercises   Heelslides Sitting;10 reps;AROM; Right  (x 2 sets )   Hip Abduction Sitting;10 reps;AAROM; Right  (x 2 sets )   Knee AROM Long Arc Quad Sitting;10 reps;AAROM; Right  (x 2 sets )   Ankle Pumps Sitting;10 reps;AROM; Bilateral  (x 2 sets )   Assessment   Prognosis Good   Problem List Decreased strength;Decreased range of motion;Decreased endurance;Decreased mobility;Pain   Assessment Pt tolerated treatment well  Pt was able to perform all LE therex without any increase in pain  Pt was able to perform all bed mobility and transfers with less A required compared to previous treatment session  Pt was able to ambulate 100ft x 2 with decreased pain and with less A compared to previous session  Pt was able to negotiate 4 stairs this session which is improved compared to previous session  Pt was positioned back in bed at conclusion of PT session with phone and call bell within reach  PT will continue to follow  DC recommendation when medically cleared is home PT  Barriers to Discharge None   Goals   Patient Goals to go home    STG Expiration Date 12/15/17   Treatment Day 2   Plan   Treatment/Interventions Functional transfer training;LE strengthening/ROM; Elevations; Therapeutic exercise; Endurance training;Bed mobility;Gait training;Spoke to nursing;Family   Progress Progressing toward goals   PT Frequency 7x/wk; Twice a day   Recommendation   Recommendation Home PT   Equipment Recommended Walker  (commode)   PT - OK to Discharge Yes  (when medically cleared )   Mari Grumbling, PT

## 2017-12-07 NOTE — PLAN OF CARE

## 2017-12-07 NOTE — SOCIAL WORK
CM met with patient at bedside to address discharge needs  Patient reports living in a twp story house with spouse; patient is able to alive on main level when discharged  Patient denies use of DME; patient will need RW and BSC prior to discharge; scripts and referral submitted to [facility]  Patient drives; reports his spouse Jennifer Hartmann is available at discharge to transport home  Patient is recommended for Home PT; referral submitted to [facility] as requested by patient; patient accepted, AVS updated  POA identified as spouse Carolina Dougherty  Patient uses [facility] in [place] for Rx needs; patient made aware of [address] to use at discharge; patient is agreeable to using [address] at discharge  Help/support reported  Patient made aware of CM's name and role  No other needs at preset  CM to follow as needed

## 2017-12-07 NOTE — PLAN OF CARE
Problem: PHYSICAL THERAPY ADULT  Goal: Performs mobility at highest level of function for planned discharge setting  See evaluation for individualized goals  Treatment/Interventions: Functional transfer training, LE strengthening/ROM, Elevations, Therapeutic exercise, Endurance training, Equipment eval/education, Bed mobility, Gait training, Spoke to MD, Spoke to nursing  Equipment Recommended: Sharifa Chavez       See flowsheet documentation for full assessment, interventions and recommendations  Outcome: Progressing  Prognosis: Good  Problem List: Decreased strength, Decreased range of motion, Decreased endurance, Decreased mobility, Pain  Assessment: Pt tolerated treatment well  Pt was able to perform all LE therex without any increase in pain  Pt was able to perform all bed mobility and transfers with less A required compared to previous treatment session  Pt was able to ambulate 100ft x 2 with decreased pain and with less A compared to previous session  Pt was able to negotiate 4 stairs this session which is improved compared to previous session  Pt was positioned back in bed at conclusion of PT session with phone and call bell within reach  PT will continue to follow  DC recommendation when medically cleared is home PT  Barriers to Discharge: None     Recommendation: Home PT     PT - OK to Discharge: Yes (when medically cleared )    See flowsheet documentation for full assessment

## 2017-12-07 NOTE — PHYSICAL THERAPY NOTE
PHYSICAL THERAPY NOTE          Patient Name: Mikey Burns  [id number] Date: 12/7/2017 12/07/17 0930   Pain Assessment   Pain Assessment 0-10   Pain Score 6   Pain Type Surgical pain   Pain Location Knee   Pain Orientation Right   Pain Descriptors Aching   Pain Frequency Constant/continuous   Pain Onset Ongoing   Clinical Progression Gradually improving   Effect of Pain on Daily Activities increased pain with activity    Patient's Stated Pain Goal No pain   Hospital Pain Intervention(s) Ambulation/increased activity; Emotional support   Response to Interventions tolerated    Restrictions/Precautions   Weight Bearing Precautions Per Order Yes   RLE Weight Bearing Per Order WBAT   Other Precautions Fall Risk;Pain   General   Chart Reviewed Yes   Response to Previous Treatment Patient with no complaints from previous session  Family/Caregiver Present No   Cognition   Overall Cognitive Status WFL   Arousal/Participation Alert   Attention Within functional limits   Orientation Level Oriented to person;Oriented to place;Oriented to time   Memory Within functional limits   Following Commands Follows all commands and directions without difficulty   Subjective   Subjective pt agreeable to PT    Bed Mobility   Supine to Sit 4  Minimal assistance   Additional items Increased time required;LE management   Sit to Supine 4  Minimal assistance   Additional items Assist x 1; Increased time required;Verbal cues   Transfers   Sit to Stand 5  Supervision   Additional items Increased time required;Verbal cues   Stand to Sit 5  Supervision   Additional items Increased time required;Verbal cues   Ambulation/Elevation   Gait pattern Excessively slow; Short stride; Foward flexed   Gait Assistance 5  Supervision   Assistive Device Rolling walker   Distance 75ftx2   Stair Management Assistance 5  Supervision   Additional items Verbal cues   Stair Management Technique One rail R   Number of Stairs 1   Balance   Static Sitting Fair +   Static Standing Fair   Ambulatory Fair   Endurance Deficit   Endurance Deficit Yes   Endurance Deficit Description pain and fatigue    Activity Tolerance   Activity Tolerance Patient limited by fatigue;Patient limited by pain   Nurse Made Aware pt able to be seen per nsg    Exercises   Heelslides Sitting;10 reps;AROM; Right  (x 2 sets )   Hip Abduction Sitting;10 reps;AROM; Right  (x 2 sets )   Knee AROM Short Arc Quad Supine;10 reps;AROM; Bilateral  (x 2 sets )   Knee AROM Long Arc Quad Sitting;10 reps;AROM; Right  (x 2 sets )   Ankle Pumps Sitting;10 reps;AROM; Right  (x 2 sets )   Marching Sitting;10 reps;AROM; Right  (x 2 sets )   Assessment   Prognosis Good   Problem List Decreased strength;Decreased range of motion;Decreased endurance;Decreased mobility;Pain;Orthopedic restrictions   Assessment Pt tolerated treatment well  Pt was able to tolerated all LE therex without any increase in R LE pain  Pt continues to require verbal instruction for proper form  Pt was able to perform all bed mobility min A x 1 with LE management which is improved since previous session  Pt was able to perform all transfer with S this session as well as ambulation with S which is improved compared to previous session  Pt continues to report increased pain with ambulation  Pt was able to perform stair negotiation this session with S  Pt was positioned back in bed post session with phone and call bell within reach  Pt unable to sit OOB in chair at this time  Pt was encouraged to continue to sit OOB in chair as much as tolerated  PT will continue to follow  DC recommendation when medically cleared is home PT  Barriers to Discharge None   Goals   Patient Goals to go home    STG Expiration Date 12/15/17   Treatment Day 2   Plan   Treatment/Interventions Functional transfer training;LE strengthening/ROM; Elevations; Therapeutic exercise; Endurance training; Bed mobility;Gait training;Spoke to nursing   Progress Progressing toward goals   PT Frequency 7x/wk; Twice a day   Recommendation   Recommendation Home PT   Equipment Recommended Walker  (commode )   PT - OK to Discharge Yes  (when medically cleared )   Jenniffer Streeter, PT

## 2017-12-07 NOTE — PLAN OF CARE
GENITOURINARY - ADULT     Maintains or returns to baseline urinary function Progressing     Absence of urinary retention Progressing        HEMATOLOGIC - ADULT     Maintains hematologic stability Progressing        METABOLIC, FLUID AND ELECTROLYTES - ADULT     Electrolytes maintained within normal limits Progressing     Fluid balance maintained Progressing        MUSCULOSKELETAL - ADULT     Maintain or return mobility to safest level of function Progressing     Maintain proper alignment of affected body part Progressing        Potential for Falls     Patient will remain free of falls Progressing        Prexisting or High Potential for Compromised Skin Integrity     Skin integrity is maintained or improved Progressing        SKIN/TISSUE INTEGRITY - ADULT     Skin integrity remains intact Progressing     Incision(s), wounds(s) or drain site(s) healing without S/S of infection Progressing

## 2017-12-14 ENCOUNTER — GENERIC CONVERSION - ENCOUNTER (OUTPATIENT)
Dept: OTHER | Facility: OTHER | Age: 57
End: 2017-12-14

## 2017-12-14 ENCOUNTER — ALLSCRIPTS OFFICE VISIT (OUTPATIENT)
Dept: OTHER | Facility: OTHER | Age: 57
End: 2017-12-14

## 2017-12-21 NOTE — DISCHARGE SUMMARY
Discharge Summary - Medical Marilyn Grad 62 y o  male MRN: [id number]    [facility]  Room / Bed: [address] [place] [address]2 [facility],[address] Encounter: [id number]    BRIEF OVERVIEW  Admitting Provider: Jana Evans MD  Discharge Provider: No att  providers found  Primary Care Physician at Discharge: Stephen Marmolejo      Admission Date: 12/5/2017     Discharge Date: 12/7/2017  6:52 PM    Primary Discharge Diagnosis  Principal Problem:    Primary osteoarthritis of right knee  Resolved Problems:    * No resolved hospital problems  *        Discharge Disposition: Home with [employer] [facility]    Procedure: R TKA    Discharge Medications:  See after visit summary for reconciled discharge medications provided to patient and family  Allergies  Allergies   Allergen Reactions    Nuts Hives    Soybean-Containing Drug Products Hives    Wheat Bran Hives         [address] [facility]  Patient was transferred to the recovery room post operatively in stable condition  On POD #1 patients vital signs were stable  He  was seen by Physical Therapy and was progressing well  Patient was medically stable  He continued to be orthopaedically and medically stable for the rest of His inpatient stay and was cleared for discharge on Post Op Day 2      Physical Exam at Discharge  stable

## 2017-12-26 ENCOUNTER — TRANSCRIBE ORDERS (OUTPATIENT)
Dept: LAB | Facility: CLINIC | Age: 57
End: 2017-12-26

## 2017-12-26 ENCOUNTER — APPOINTMENT (OUTPATIENT)
Dept: PHYSICAL THERAPY | Facility: CLINIC | Age: 57
End: 2017-12-26
Payer: COMMERCIAL

## 2017-12-26 ENCOUNTER — APPOINTMENT (OUTPATIENT)
Dept: LAB | Facility: CLINIC | Age: 57
End: 2017-12-26
Payer: COMMERCIAL

## 2017-12-26 DIAGNOSIS — Z79.01 LONG-TERM (CURRENT) USE OF ANTICOAGULANTS: Primary | ICD-10-CM

## 2017-12-26 LAB
INR PPP: 1.15 (ref 0.86–1.16)
PROTHROMBIN TIME: 14.7 SECONDS (ref 12.1–14.4)

## 2017-12-26 PROCEDURE — G8979 MOBILITY GOAL STATUS: HCPCS

## 2017-12-26 PROCEDURE — 97164 PT RE-EVAL EST PLAN CARE: CPT

## 2017-12-26 PROCEDURE — 97140 MANUAL THERAPY 1/> REGIONS: CPT

## 2017-12-26 PROCEDURE — 36415 COLL VENOUS BLD VENIPUNCTURE: CPT

## 2017-12-26 PROCEDURE — 85610 PROTHROMBIN TIME: CPT

## 2017-12-26 PROCEDURE — G8978 MOBILITY CURRENT STATUS: HCPCS

## 2017-12-27 ENCOUNTER — APPOINTMENT (OUTPATIENT)
Dept: PHYSICAL THERAPY | Facility: CLINIC | Age: 57
End: 2017-12-27
Payer: COMMERCIAL

## 2017-12-27 PROCEDURE — 97140 MANUAL THERAPY 1/> REGIONS: CPT

## 2017-12-27 PROCEDURE — 97110 THERAPEUTIC EXERCISES: CPT

## 2017-12-28 ENCOUNTER — APPOINTMENT (OUTPATIENT)
Dept: LAB | Facility: CLINIC | Age: 57
End: 2017-12-28
Payer: COMMERCIAL

## 2017-12-28 DIAGNOSIS — Z79.01 LONG-TERM (CURRENT) USE OF ANTICOAGULANTS: ICD-10-CM

## 2017-12-28 LAB
INR PPP: 1.32 (ref 0.86–1.16)
PROTHROMBIN TIME: 16.5 SECONDS (ref 12.1–14.4)

## 2017-12-28 PROCEDURE — 36415 COLL VENOUS BLD VENIPUNCTURE: CPT

## 2017-12-28 PROCEDURE — 85610 PROTHROMBIN TIME: CPT

## 2017-12-29 ENCOUNTER — APPOINTMENT (OUTPATIENT)
Dept: PHYSICAL THERAPY | Facility: CLINIC | Age: 57
End: 2017-12-29
Payer: COMMERCIAL

## 2017-12-29 PROCEDURE — 97150 GROUP THERAPEUTIC PROCEDURES: CPT

## 2017-12-29 PROCEDURE — 97140 MANUAL THERAPY 1/> REGIONS: CPT

## 2017-12-29 PROCEDURE — 97110 THERAPEUTIC EXERCISES: CPT

## 2018-01-02 ENCOUNTER — APPOINTMENT (OUTPATIENT)
Dept: PHYSICAL THERAPY | Facility: CLINIC | Age: 58
End: 2018-01-02
Payer: COMMERCIAL

## 2018-01-02 ENCOUNTER — APPOINTMENT (OUTPATIENT)
Dept: LAB | Facility: CLINIC | Age: 58
End: 2018-01-02
Payer: COMMERCIAL

## 2018-01-02 DIAGNOSIS — Z79.01 LONG-TERM (CURRENT) USE OF ANTICOAGULANTS: ICD-10-CM

## 2018-01-02 LAB
INR PPP: 1.49 (ref 0.86–1.16)
PROTHROMBIN TIME: 18.1 SECONDS (ref 12.1–14.4)

## 2018-01-02 PROCEDURE — 97110 THERAPEUTIC EXERCISES: CPT

## 2018-01-02 PROCEDURE — 97140 MANUAL THERAPY 1/> REGIONS: CPT

## 2018-01-02 PROCEDURE — 36415 COLL VENOUS BLD VENIPUNCTURE: CPT

## 2018-01-02 PROCEDURE — 85610 PROTHROMBIN TIME: CPT

## 2018-01-03 ENCOUNTER — APPOINTMENT (OUTPATIENT)
Dept: PHYSICAL THERAPY | Facility: CLINIC | Age: 58
End: 2018-01-03
Payer: COMMERCIAL

## 2018-01-03 PROCEDURE — 97140 MANUAL THERAPY 1/> REGIONS: CPT

## 2018-01-03 PROCEDURE — 97150 GROUP THERAPEUTIC PROCEDURES: CPT

## 2018-01-03 PROCEDURE — 97110 THERAPEUTIC EXERCISES: CPT

## 2018-01-04 ENCOUNTER — APPOINTMENT (OUTPATIENT)
Dept: PHYSICAL THERAPY | Facility: CLINIC | Age: 58
End: 2018-01-04
Payer: COMMERCIAL

## 2018-01-05 ENCOUNTER — APPOINTMENT (OUTPATIENT)
Dept: LAB | Facility: CLINIC | Age: 58
End: 2018-01-05
Payer: COMMERCIAL

## 2018-01-05 ENCOUNTER — APPOINTMENT (OUTPATIENT)
Dept: PHYSICAL THERAPY | Facility: CLINIC | Age: 58
End: 2018-01-05
Payer: COMMERCIAL

## 2018-01-05 PROCEDURE — 85610 PROTHROMBIN TIME: CPT

## 2018-01-05 PROCEDURE — 97150 GROUP THERAPEUTIC PROCEDURES: CPT

## 2018-01-05 PROCEDURE — 97140 MANUAL THERAPY 1/> REGIONS: CPT

## 2018-01-05 PROCEDURE — 36415 COLL VENOUS BLD VENIPUNCTURE: CPT

## 2018-01-05 PROCEDURE — 97110 THERAPEUTIC EXERCISES: CPT

## 2018-01-08 ENCOUNTER — APPOINTMENT (OUTPATIENT)
Dept: LAB | Facility: CLINIC | Age: 58
End: 2018-01-08
Payer: COMMERCIAL

## 2018-01-08 DIAGNOSIS — Z79.01 LONG-TERM (CURRENT) USE OF ANTICOAGULANTS: ICD-10-CM

## 2018-01-08 LAB
INR PPP: 1.84 (ref 0.86–1.16)
PROTHROMBIN TIME: 21.4 SECONDS (ref 12.1–14.4)

## 2018-01-08 PROCEDURE — 85610 PROTHROMBIN TIME: CPT

## 2018-01-08 PROCEDURE — 36415 COLL VENOUS BLD VENIPUNCTURE: CPT

## 2018-01-09 ENCOUNTER — APPOINTMENT (OUTPATIENT)
Dept: PHYSICAL THERAPY | Facility: CLINIC | Age: 58
End: 2018-01-09
Payer: COMMERCIAL

## 2018-01-09 PROCEDURE — 97110 THERAPEUTIC EXERCISES: CPT

## 2018-01-09 PROCEDURE — 97140 MANUAL THERAPY 1/> REGIONS: CPT

## 2018-01-09 PROCEDURE — 97150 GROUP THERAPEUTIC PROCEDURES: CPT

## 2018-01-10 ENCOUNTER — APPOINTMENT (OUTPATIENT)
Dept: PHYSICAL THERAPY | Facility: CLINIC | Age: 58
End: 2018-01-10
Payer: COMMERCIAL

## 2018-01-10 PROCEDURE — 97110 THERAPEUTIC EXERCISES: CPT

## 2018-01-10 PROCEDURE — 97140 MANUAL THERAPY 1/> REGIONS: CPT

## 2018-01-11 ENCOUNTER — APPOINTMENT (OUTPATIENT)
Dept: LAB | Facility: CLINIC | Age: 58
End: 2018-01-11
Payer: COMMERCIAL

## 2018-01-11 DIAGNOSIS — Z79.01 LONG-TERM (CURRENT) USE OF ANTICOAGULANTS: ICD-10-CM

## 2018-01-11 LAB
INR PPP: 1.84 (ref 0.86–1.16)
PROTHROMBIN TIME: 21.4 SECONDS (ref 12.1–14.4)

## 2018-01-11 PROCEDURE — 85610 PROTHROMBIN TIME: CPT

## 2018-01-11 PROCEDURE — 36415 COLL VENOUS BLD VENIPUNCTURE: CPT

## 2018-01-12 ENCOUNTER — APPOINTMENT (OUTPATIENT)
Dept: PHYSICAL THERAPY | Facility: CLINIC | Age: 58
End: 2018-01-12
Payer: COMMERCIAL

## 2018-01-12 PROCEDURE — 97110 THERAPEUTIC EXERCISES: CPT

## 2018-01-12 PROCEDURE — 97150 GROUP THERAPEUTIC PROCEDURES: CPT

## 2018-01-12 PROCEDURE — 97140 MANUAL THERAPY 1/> REGIONS: CPT

## 2018-01-13 VITALS
TEMPERATURE: 97.1 F | DIASTOLIC BLOOD PRESSURE: 76 MMHG | BODY MASS INDEX: 32.69 KG/M2 | WEIGHT: 228.38 LBS | SYSTOLIC BLOOD PRESSURE: 102 MMHG | RESPIRATION RATE: 14 BRPM | OXYGEN SATURATION: 98 % | HEIGHT: 70 IN | HEART RATE: 59 BPM

## 2018-01-13 VITALS
RESPIRATION RATE: 16 BRPM | HEART RATE: 66 BPM | TEMPERATURE: 96.5 F | OXYGEN SATURATION: 99 % | SYSTOLIC BLOOD PRESSURE: 110 MMHG | BODY MASS INDEX: 34.93 KG/M2 | DIASTOLIC BLOOD PRESSURE: 70 MMHG | WEIGHT: 244 LBS | HEIGHT: 70 IN

## 2018-01-13 NOTE — PROGRESS NOTES
Assessment    1  Encounter for preventive health examination (V70 0) (Z00 00)   2  Bilateral hearing loss (389 9) (H91 93)   3  Food allergy (V15 05) (Z91 018)   4  GERD without esophagitis (530 81) (K21 9)   5  Atypical chest pain (786 59) (R07 89)   6  Encounter for screening colonoscopy (V76 51) (Z12 11)   7  Fatigue (780 79) (R53 83)    Plan  Atypical chest pain    · EKG/ECG- POC; Status:Active; Requested for:16Jku9457;    · NM MYOCARDIAL PERFUSION SPECT (RX STRESS AND/OR REST); Status:Need  Information - Financial Authorization; Requested for:37Rkg7650; Atypical chest pain, Health Maintenance, Food allergy, GERD without esophagitis    · (Q) CBC (H/H, RBC, INDICES, WBC, PLT); Status:Active; Requested for:39Qrl9853;    · (Q) COMPREHENSIVE METABOLIC PNL W/ADJUSTED CALCIUM; Status:Active; Requested for:81Clb2785;    · (Q) LIPID PANEL WITH REFLEX TO DIRECT LDL; Status:Active; Requested  for:13Xrc1357;    · (Q) PSA, TOTAL WITH REFLEX TO PSA, FREE; Status:Active; Requested  for:91Cjx2373;    · (Q) TSH, 3RD GENERATION W/REFLEX TO FT4; Status:Active; Requested  for:86Loa5559;    · *(Q) VITAMIN D, 25-HYDROXY, LC/MS/MS; Status:Active; Requested for:97Eng9525;   Encounter for screening colonoscopy    · 1 - Cristian Mcfarlane MD, Andrea Taylor (Gastroenterology) Physician Referral  Consult  Status: Active   Requested for: 97KXH6617  Care Summary provided  : Yes   · COLONOSCOPY; Status:Active; Requested for:46Rzu9713; Health Maintenance    · Fluzone Quadrivalent Intramuscular Suspension; INJECT 0 5  ML  Intramuscular; To Be Done: 66NXK2442  PMH: Need for immunization against influenza    · Fluzone Quadrivalent Intramuscular Suspension    Discussion/Summary    55 yo PE today  will draw FBW today (including psa)  rx given for colonoscopy  flu shot given today  pt advised to consider starting daily low dose asa 81 therapy  --Atypical CP: pt describes as 1 second occ "chest twinges"    these are non exertional  no significant family hx of cardiac ds  EKG today did not reveal any significant findings  will send for nuclear stress test  (treadmill)  --Fatigue: mild  will check labs today  consider sleep study if sxs persist  --? Hearing loss: refer to audiologist  --Hx multiple food allergies: controlled w/ avoidance and daily allegra  --GERD: rare sxs  usu respond to otc antacids  --Hx of provoked PE following surgery in 94      will call  yearly/prn  Chief Complaint  Pt presents for a annual PE  Pt states he will like to have a referral to see [address] [place] [address] for a hearing test which he has scheduled for next week  History of Present Illness  HPI: 53 yo PE today  pt has been feeling a little tired lately  he also experiences occ "chest twinges" lasting a second (non exertional)  pt would like a referral to see a audiologist due to hearing loss  pt takes allegra due to multiple food allergens  no other probs today  Review of Systems    Constitutional: feeling tired, but as noted in HPI and not feeling poorly  ENT: hearing loss, but as noted in HPI  Cardiovascular: chest pain, but as noted in HPI, the heart rate was not slow, the heart rate was not fast, no palpitations and no extremity edema  Respiratory: No complaints of shortness of breath, no wheezing, no cough, no SOB on exertion, no orthopnea or PND  Gastrointestinal: No complaints of abdominal pain, no constipation, no nausea or vomiting, no diarrhea or bloody stools  Genitourinary: No complaints of dysuria, no incontinence, no hesitancy, no nocturia, no genital lesion, no testicular pain  Current Meds   1  Allegra Allergy TABS; Therapy: (Recorded:98Iue9915) to Recorded    Allergies    1   No Known Drug Allergies    Vitals   Recorded: 76GYO1719 08:54AM Recorded: 04UAR9905 08:39AM   Temperature  97 5 F, Tympanic   Heart Rate  64   Pulse Quality  Norm   Respiration  16   Respiration Quality  Norm   Systolic 849 172, LUE, Sitting   Diastolic 76 74, LUE, Sitting Height  5 ft 11 in   Weight  241 lb 7 04 oz   BMI Calculated  33 67   BSA Calculated  2 28   O2 Saturation  98     Physical Exam    Constitutional   General appearance: No acute distress, well appearing and well nourished  Eyes   Pupils and irises: Equal, round and reactive to light  Pulmonary   Respiratory effort: No increased work of breathing or signs of respiratory distress  Auscultation of lungs: Clear to auscultation  Cardiovascular   Palpation of heart: Normal PMI, no thrills  Auscultation of heart: Normal rate and rhythm, normal S1 and S2, without murmurs  Examination of extremities for edema and/or varicosities: Normal     Abdomen   Abdomen: Non-tender, no masses  Liver and spleen: No hepatomegaly or splenomegaly  Lymphatic   Palpation of lymph nodes in neck: No lymphadenopathy  Musculoskeletal   Gait and station: Normal     Neurologic   Cranial nerves: Cranial nerves 2-12 intact  Psychiatric   Orientation to person, place and time: Normal     Mood and affect: Normal        Signatures   Electronically signed by :  Gene Mims DO; Feb 5 2016  9:30AM EST                       (Author)

## 2018-01-15 ENCOUNTER — APPOINTMENT (OUTPATIENT)
Dept: PHYSICAL THERAPY | Facility: CLINIC | Age: 58
End: 2018-01-15
Payer: COMMERCIAL

## 2018-01-15 PROCEDURE — G8978 MOBILITY CURRENT STATUS: HCPCS

## 2018-01-15 PROCEDURE — G8979 MOBILITY GOAL STATUS: HCPCS

## 2018-01-15 PROCEDURE — 97110 THERAPEUTIC EXERCISES: CPT

## 2018-01-15 PROCEDURE — 97150 GROUP THERAPEUTIC PROCEDURES: CPT

## 2018-01-15 PROCEDURE — 97140 MANUAL THERAPY 1/> REGIONS: CPT

## 2018-01-15 NOTE — RESULT NOTES
Verified Results  NM MYOCARDIAL PERFUSION SPECT [facility] STRESS AND/OR REST) [id number] 07:37AM Max Right Order Number: [id number]     Test Name Result Flag Reference   NM MYOCARDIAL PERFUSION SPECT (RX STRESS AND/OR REST) (Report)     [facility]   [address]  [place], [address]   [phone]     Rest/Stress SPECT Myocardial Perfusion Imaging After Exercise     Patient: Shreya Pryor   MR number: [id number]   Account number: [de-identified]   : 1960   Age: 54 years   Gender: Male   Status: Outpatient   Location: [address] [place] [address]   Height: 71 in   Weight: 241 lb   BP: 122/ 82 mmHg     Allergies: NUTS, SOYBEAN-CONTAINING DRUG PRODUCTS, WHEAT BRAN     Diagnosis: R07 89 - Other chest pain     Primary Physician: General Tonio DO   Technician: Shiela García   RN: YUNI MarionN   Referring Physician: General Tonio DO   Group: [facility]   Report Prepared By[de-identified] Elen Meeks RN BSN   Interpreting Physician: Froilan Couch DO     INDICATIONS: Detection of coronary artery disease  HISTORY: The patient is a 54year old  male  Chest pain status: chest   pain, consistent with atypical angina  Coronary artery disease risk factors:   family history of premature coronary artery disease  Cardiovascular history:   none significant  Co-morbidity: history of lung disease( pulmonary emboli)  Medications: no cardiac drugs  PHYSICAL EXAM: Baseline physical exam screening: normal lung exam      REST ECG: Sinus bradycardia  PROCEDURE: The study was performed at the [address] [place] [address]  Treadmill exercise testing was performed, using the [place] protocol  SPECT   myocardial perfusion imaging was performed during stress and at rest  Systolic   blood pressure was 122 mmHg, at the start of the study  Diastolic blood   pressure was 82 mmHg, at the start of the study   The heart rate was 55 bpm, at   the start of the study  IV double checked  GARRY PROTOCOL:   HR bpm SBP mmHg DBP mmHg Symptoms   Baseline 55 122 82 none   Stage 1 92 126 70 none   Stage 2 114 136 70 mild dyspnea   Stage 3 141 144 80 moderate dyspnea, fatigue   Recovery 1 64 156 72 subsiding   Recovery 2 93 126 66 none   No medications or fluids given  STRESS SUMMARY: Duration of exercise was 8 min and 50 sec  The patient   exercised to protocol stage 3  Maximal work rate was 10 4 METs  Maximal heart   rate during stress was 141 bpm ( 85 % of maximal predicted heart rate)  The   heart rate response to stress was normal  There was normal resting blood   pressure with an appropriate response to stress  The rate-pressure product for   the peak heart rate and blood pressure was 69996  There was no chest pain   during stress  The stress test was terminated due to achievement of maximal   (symptom limited) exercise and achievement of target heart rate  Pre oxygen   saturation: 98 %  Peak oxygen saturation: 98 %  The stress ECG was negative for   ischemia  There were no stress arrhythmias or conduction abnormalities  ISOTOPE ADMINISTRATION:   Resting isotope administration Stress isotope administration   Agent Tetrofosmin Tetrofosmin   Dose 10 1 mCi 31 4 mCi   Date 02/16/2016 02/16/2016   Injection time 08:16 09:59   Imaging time 08:50 10:29   Injection-image interval 40 min 30 min     Radiopharmaceutical was administered 8 min into the stress protocol  There was   50 sec of exercise after the injection  MYOCARDIAL PERFUSION IMAGING:   The image quality was good  Left ventricular size was normal  The TID ratio was   1 12  PERFUSION DEFECTS:   - There were no perfusion defects  GATED SPECT:   The calculated left ventricular ejection fraction was 60 %  Left ventricular   ejection fraction was within normal limits by visual estimate  There was no   left ventricular regional abnormality       SUMMARY:   - Stress results: Duration of exercise was 8 min and 50 sec  Target heart rate   was achieved  There was no chest pain during stress  - ECG conclusions: The stress ECG was negative for ischemia  - Perfusion imaging: There were no perfusion defects    - Gated SPECT: The calculated left ventricular ejection fraction was 60 %  Left ventricular ejection fraction was within normal limits by visual estimate  There was no left ventricular regional abnormality  IMPRESSIONS: Normal study after maximal exercise without reproduction of   symptoms  Myocardial perfusion imaging was normal at rest and with stress   Left   ventricular systolic function was normal      Prepared and signed by     Kelley Reynolds DO   Signed 02/16/2016 12:07:21

## 2018-01-17 ENCOUNTER — APPOINTMENT (OUTPATIENT)
Dept: PHYSICAL THERAPY | Facility: CLINIC | Age: 58
End: 2018-01-17
Payer: COMMERCIAL

## 2018-01-17 PROCEDURE — 97140 MANUAL THERAPY 1/> REGIONS: CPT

## 2018-01-17 PROCEDURE — 97110 THERAPEUTIC EXERCISES: CPT

## 2018-01-17 NOTE — RESULT NOTES
Verified Results  (Q) CBC (H/H, RBC, INDICES, WBC, PLT) [id number] 12:00AM [facility]     Test Name Result Flag Reference   WHITE BLOOD CELL COUNT 5 4 Thousand/uL  3 8-10 8   RED BLOOD CELL COUNT 4 89 Million/uL  4 20-5 80   HEMOGLOBIN 14 6 g/dL  13 2-17 1   HEMATOCRIT 44 2 %  38 5-50 0   MCV 90 5 fL  80 0-100 0   MCH 29 9 pg  27 0-33 0   MCHC 33 0 g/dL  32 0-36 0   RDW 14 3 %  11 0-15 0   PLATELET COUNT 747 Thousand/uL  140-400     (Q) COMPREHENSIVE METABOLIC PNL W/ADJUSTED CALCIUM 63IJC0168 12:00AM [facility]     Test Name Result Flag Reference   GLUCOSE 94 mg/dL  65-99   Fasting reference interval   UREA NITROGEN (BUN) 16 mg/dL  7-25   CREATININE 0 95 mg/dL  0 70-1 33   For patients >52years of age, the reference limit  for Creatinine is approximately 13% higher for people  identified as -American  eGFR NON-AFR  AMERICAN 90 mL/min/1 73m2  > OR = 60   eGFR AFRICAN AMERICAN 104 mL/min/1 73m2  > OR = 60   BUN/CREATININE RATIO   4-24   NOT APPLICABLE (calc)   SODIUM 140 mmol/L  135-146   POTASSIUM 4 3 mmol/L  3 5-5 3   CHLORIDE 108 mmol/L     CARBON DIOXIDE 22 mmol/L  19-30   CALCIUM 9 3 mg/dL  8 6-10 3   CALCIUM (ADJUSTED FOR$ALBUMIN) 9 4 mg/dL (calc)  8 6-10 2   PROTEIN, TOTAL 6 4 g/dL  6 1-8 1   ALBUMIN 4 2 g/dL  3 6-5 1   GLOBULIN 2 2 g/dL (calc)  1 9-3 7   ALBUMIN/GLOBULIN RATIO 1 9 (calc)  1 0-2 5   BILIRUBIN, TOTAL 0 8 mg/dL  0 2-1 2   ALKALINE PHOSPHATASE 61 U/L     AST 20 U/L  10-35   ALT 19 U/L  9-46     (Q) LIPID PANEL WITH REFLEX TO DIRECT LDL [id number] 12:00AM [facility]     Test Name Result Flag Reference   CHOLESTEROL, TOTAL 171 mg/dL  125-200   HDL CHOLESTEROL 49 mg/dL  > OR = 40   TRIGLICERIDES 61 mg/dL  <987   LDL-CHOLESTEROL 110 mg/dL (calc)  <130   Desirable range <100 mg/dL for patients with CHD or  diabetes and <70 mg/dL for diabetic patients with  known heart disease     CHOL/HDLC RATIO 3 5 (calc)  < OR = 5 0   NON HDL CHOLESTEROL 122 mg/dL (calc)     Target for non-HDL cholesterol is 30 mg/dL higher than   LDL cholesterol target  (Q) PSA, TOTAL WITH REFLEX TO PSA, FREE [id number] 12:00AM [facility]     Test Name Result Flag Reference   TOTAL PSA 1 1 ng/mL  < OR = 4 0   This test was performed using the Sheldon Silas  immunoassay method  Values obtained with other assay  methods cannot be used interchangeably  PSA levels,  regardless of value, should not be interpreted as  absolute evidence of the presence or absence of disease  (Q) TSH, 3RD GENERATION W/REFLEX TO FT4 [id number] 12:00AM [facility]     Test Name Result Flag Reference   TSH W/REFLEX TO FT4 2 06 mIU/L  0 40-4 50     *(Q) VITAMIN D, 25-HYDROXY, LC/MS/MS [id number] 12:00AM [facility]     Test Name Result Flag Reference   VITAMIN D, 25-OH, TOTAL 23 ng/mL L    Vitamin D Status         25-OH Vitamin D:     Deficiency:                    <20 ng/mL  Insufficiency:             20 - 29 ng/mL  Optimal:                 > or = 30 ng/mL     For 25-OH Vitamin D testing on patients on   D2-supplementation and patients for whom quantitation   of D2 and D3 fractions is required, the QuestAssureD(TM)  25-OH VIT D, (D2,D3), LC/MS/MS is recommended: order   code 49049 (patients >2yrs)  For more information on this test, go to:  http://BIScience/faq/CGU845  (This link is being provided for   informational/educational purposes only )

## 2018-01-18 NOTE — CONSULTS
Chief Complaint  Pt presents for pre-op clearance for R total knee replacement surgery being done on 84/2/73 [facility]  Pt had PATs already done @ [facility]  History of Present Illness  HPI: Preop clearance for upcoming R TKR on 12/[address] at [address] by Dr Saul Chaudhry  otherwise pt is doing well today  pt does not take any Rx medications  Patient has history of DVT with physical exam following knee surgery in 1994  Review of Systems    Constitutional: No fever or chills, feels well, no tiredness, no recent weight gain or weight loss  Cardiovascular: No complaints of slow heart rate, no fast heart rate, no chest pain, no palpitations, no leg claudication, no lower extremity  Respiratory: No complaints of shortness of breath, no wheezing, no cough, no SOB on exertion, no orthopnea or PND  Gastrointestinal: No complaints of abdominal pain, no constipation, no nausea or vomiting, no diarrhea or bloody stools  Genitourinary: No complaints of dysuria, no incontinence, no hesitancy, no nocturia, no genital lesion, no testicular pain  Musculoskeletal: as noted in HPI  Active Problems    1  Bilateral hearing loss (389 9) (H91 93)   2  Disc degeneration, lumbar (722 52) (M51 36)   3  Fatigue (780 79) (R53 83)   4  Food allergy (V15 05) (Z91 018)   5  GERD without esophagitis (530 81) (K21 9)   6  Osteoarthritis of both knees, unspecified osteoarthritis type (715 96) (M17 0)   7   Squamous cell carcinomatosis (199 0) (C80 0)    Past Medical History    · History of Atypical chest pain (786 59) (R07 89)   · History of Encounter for screening colonoscopy (V76 51) (Z12 11)   · History of DVT (deep vein thrombosis) (V12 51) (Z86 718)   · History of influenza vaccination (V49 89) (Z92 29)   · History of pulmonary embolism (V12 55) (Z86 711)   · History of Need for immunization against influenza (V04 81) (Z23)    Surgical History    · History of Arthroscopy Knee Right   · History of Colonoscopy (Fiberoptic)    The surgical history was reviewed and updated today  Family History    · Family history of Alzheimer's disease of other onset without behavioral disturbance    · Family history of Acute congestive heart failure, unspecified congestive heart failure type    · Family history of leukemia (V16 6) (Z80 6)    · Family history of diabetes mellitus (V18 0) (Z83 3)    · Family history of malignant neoplasm (V16 9) (Z80 9)    Social History    · Denied: History of Alcohol use   · Always uses seat belt   · Current some day smoker (305 1) (F17 200)   · No guns in the home   · Primary language is English   · Reads English  The social history was reviewed and updated today  The social history was reviewed and is unchanged  Current Meds   1  Allegra Allergy TABS; Therapy: (Recorded:08Jlp2071) to Recorded    The medication list was reviewed and updated today  Allergies    1  No Known Drug Allergies    2  Peanuts   3  Soy   4  Wheat    Vitals  Signs    Temperature: 97 1 F, Tympanic  Heart Rate: 59  Pulse Quality: Normal  Respiration Quality: Normal  Respiration: 14  Systolic: 513, LUE, Sitting  Diastolic: 76, LUE, Sitting  BP Cuff Size: Large  Height: 5 ft 10 in  Weight: 228 lb 6 oz  BMI Calculated: 32 77  BSA Calculated: 2 21  O2 Saturation: 98    Physical Exam    Constitutional   General appearance: No acute distress, well appearing and well nourished  Pulmonary   Respiratory effort: No increased work of breathing or signs of respiratory distress  Auscultation of lungs: Clear to auscultation  Cardiovascular   Palpation of heart: Normal PMI, no thrills  Auscultation of heart: Normal rate and rhythm, normal S1 and S2, without murmurs  Examination of extremities for edema and/or varicosities: Normal     Abdomen   Abdomen: Non-tender, no masses  Liver and spleen: No hepatomegaly or splenomegaly  Lymphatic   Palpation of lymph nodes in neck: No lymphadenopathy      Musculoskeletal Gait and station: Normal     Psychiatric   Orientation to person, place and time: Normal     Mood and affect: Normal        Results/Data  PHQ-9 Adult Depression Screening 28Nov2017 10:57AM User, Lesa     Test Name Result Flag Reference   PHQ-9 Adult Depression Score 2     Over the last two weeks, how often have you been bothered by any of the following problems? Little interest or pleasure in doing things: Not at all - 0  Feeling down, depressed, or hopeless: Not at all - 0  Trouble falling or staying asleep, or sleeping too much: Several days - 1  Feeling tired or having little energy: Several days - 1  Poor appetite or over eating: Not at all - 0  Feeling bad about yourself - or that you are a failure or have let yourself or your family down: Not at all - 0  Trouble concentrating on things, such as reading the newspaper or watching television: Not at all - 0  Moving or speaking so slowly that other people could have noticed  Or the opposite -  being so fidgety or restless that you have been moving around a lot more than usual: Not at all - 0  Thoughts that you would be better off dead, or of hurting yourself in some way: Not at all - 0   PHQ-9 Adult Depression Screening Negative     PHQ-9 Difficulty Level Not difficult at all     PHQ-9 Severity Minimal Depression         Assessment    1  Encounter for pre-operative examination (V72 84) (Z01 818)   2  Arthritis of right knee (716 96) (M17 11)    Discussion/Summary    Preop clearance for upcoming R TKR on 12/5 at [address] by Dr Alesia Andujar pt is doing well today  pt does not take any Rx medications  Patient has history of DVT with physical exam following knee surgery in 1994  His examination today is unremarkable  Pre-admission testing from November 21st, including chest x-ray, EKG, and labs were reviewed today   Due to past provoked DVT/PE following surgery, I agree with Dr Chelsea Sanchez recommendation to use low molecular weight heparin 1 day prior to surgery and warfarin for 6 weeks following surgery  Patient is medically cleared for surgery  End of Encounter Meds    1  Allegra Allergy TABS (Fexofenadine HCl); Therapy: (Recorded:89Mhc6167) to Recorded    Signatures   Electronically signed by :  Lynne Barker DO; Nov 28 2017 11:20AM EST                       (Author)

## 2018-01-19 ENCOUNTER — ALLSCRIPTS OFFICE VISIT (OUTPATIENT)
Dept: OTHER | Facility: OTHER | Age: 58
End: 2018-01-19

## 2018-01-19 ENCOUNTER — APPOINTMENT (OUTPATIENT)
Dept: PHYSICAL THERAPY | Facility: CLINIC | Age: 58
End: 2018-01-19
Payer: COMMERCIAL

## 2018-01-19 PROCEDURE — 97140 MANUAL THERAPY 1/> REGIONS: CPT

## 2018-01-19 PROCEDURE — 97110 THERAPEUTIC EXERCISES: CPT

## 2018-01-20 NOTE — PROGRESS NOTES
Assessment   1  Depression (311) (F32 9)   2  Arthritis of right knee (716 96) (M17 11)   3  Allergic reaction, initial encounter (995 3) (T78 40XA)   4  Need for vaccination (V05 9) (Z23)    Plan   Depression    · Sertraline HCl - 50 MG Oral Tablet; TAKE 1 TABLET DAILY AS DIRECTED  Need for vaccination    · Fluzone Quadrivalent Intramuscular Suspension; INJECT 0 5  ML    Intramuscular; To Be Done: [id number]    Discussion/Summary      --Depression: PHQ 9 score was 10 today  Patient has noticed more depressed mood and problems falling asleep since his surgery And slow recovery *( SURGERY WAS DECEMBER 5TH)  recommend start OTC melatonin 5 mg at HS along with some sertraline 50 mg daily  I offered patient referral to a counselor, he declines at this time  R knee: status post right total knee arthroplasty on December 5th by Dr Andres Prescott  overall, recovery has been slow  He is still taking Tylenol with codeine and tramadol  Patient is also still going to physical therapy  Continue regular follow-up with Orthopedics as directed reaction (? DUE TO CELEBREX): rash has resolved, but he has an appointment to see allergist in near future  flu shot today   depression recheck 1 month  Possible side effects of new medications were reviewed with the patient/guardian today  The treatment plan was reviewed with the patient/guardian  The patient/guardian understands and agrees with the treatment plan      Chief Complaint   Pt presents for depression sxs after knee replacement  History of Present Illness   HPI: Patient presents to discuss depression today  Patient had right total knee replacement done December 5th by Dr Andres Prescott  overall, his recovery has been somewhat slow  He still is taking Tylenol with codeine and tramadol for pain  Still going to physical therapy  Due to his low recovery, patient has noticed that his mood has been more depressed recently and he is having problems falling asleep         Review of Systems Constitutional: no fever or chills, feels well, no tiredness, no recent weight loss or weight gain  Cardiovascular: no complaints of slow or fast heart rate, no chest pain, no palpitations, no leg claudication or lower extremity edema  Respiratory: no complaints of shortness of breath, no wheezing or cough, no dyspnea on exertion, no orthopnea or PND  Gastrointestinal: no complaints of abdominal pain, no constipation, no nausea or vomiting, no diarrhea or bloody stools  Genitourinary: no complaints of dysuria or incontinence, no hesitancy, no nocturia, no genital lesion, no inadequacy of penile erection  Musculoskeletal: as noted in HPI  Over the past 2 weeks, how often have you been bothered by the following problems? 1 ) Little interest or pleasure in doing things? Half the days or more  2 ) Feeling down, depressed or hopeless? Several days  3 ) Trouble falling asleep or sleeping too much? Nearly every day  4 ) Feeling tired or having little energy? Not at all       5 ) Poor appetite or overeating? Half the days or more  6 ) Feeling bad about yourself, or that you are a failure, or have let yourself or your family down? Not at all       7 ) Trouble concentrating on things, such as reading a newspaper or watching television? Half the days or more  8 ) Moving or speaking so slowly that other people could have noticed, or the opposite, moving or speaking faster than usual? Not at all       9 ) Thoughts that you would be better off dead or of hurting yourself in some way? Not at all  Score 10      Active Problems   1  Allergic reaction, initial encounter (995 3) (T78 40XA)   2  Arthritis of right knee (716 96) (M17 11)   3  Bilateral hearing loss (389 9) (H91 93)   4  Disc degeneration, lumbar (722 52) (M51 36)   5  Encounter for pre-operative examination (V72 84) (Z01 818)   6  Fatigue (780 79) (R53 83)   7  Food allergy (V15 05) (Z91 018)   8  GERD without esophagitis (530 81) (K21 9)   9  Osteoarthritis of both knees, unspecified osteoarthritis type (715 96) (M17 0)   10  Squamous cell carcinomatosis (199 0) (C80 0)    Past Medical History   1  History of Atypical chest pain (786 59) (R07 89)   2  History of Encounter for screening colonoscopy (V76 51) (Z12 11)   3  History of DVT (deep vein thrombosis) (V12 51) (Z86 718)   4  History of influenza vaccination (V49 89) (Z92 29)   5  History of pulmonary embolism (V12 55) (Z86 711)   6  Denied: History of substance abuse   7  Denied: History of Mental health problem   8  History of Need for immunization against influenza (V04 81) (Z23)  Active Problems And Past Medical History Reviewed: The active problems and past medical history were reviewed and updated today  Family History   Mother    1  Family history of Alzheimer's disease of other onset without behavioral disturbance   2  Denied: Family history of substance abuse   3  Denied: Family history of Mental health problem  Father    4  Family history of Acute congestive heart failure, unspecified congestive heart failure type   5  Denied: Family history of substance abuse   6  Denied: Family history of Mental health problem  Brother    7  Family history of leukemia (V16 6) (Z80 6)  Paternal Grandmother    6  Family history of diabetes mellitus (V18 0) (Z83 3)  Paternal Grandfather    5  Family history of malignant neoplasm (V16 9) (Z80 9)    Social History    · Denied: History of Alcohol use   · Always uses seat belt   · Current some day smoker (305 1) (F17 200)   · No guns in the home   · Primary language is [place]   · Reads English  The social history was reviewed and updated today  The social history was reviewed and is unchanged  Surgical History   1  History of Arthroscopy Knee Right   2  History of Colonoscopy (Fiberoptic)    Current Meds    1  Allegra Allergy TABS; Therapy: (Recorded:53Yeq3744) to Recorded   2   OxyCODONE HCl - 5 MG Oral Capsule; TAKE 1 CAPSULE EVERY 4 HOURS AS     NEEDED; Therapy: 95SSE6970 to Recorded   3  TraMADol HCl - 50 MG Oral Tablet; Therapy: 36YAG0907 to Recorded   4  Tylenol with Codeine #3 300-30 MG Oral Tablet; TAKE 1 TABLET EVERY 6 HOURS AS     NEEDED; Therapy: 92PWP4001 to Recorded     The medication list was reviewed and updated today  Allergies   1  CeleBREX CAPS  2  Peanuts   3  Soy   4  Wheat    Vitals    Recorded: 23SZT2745 09:55AM   Temperature 97 8 F, Tympanic   Heart Rate 65   Pulse Quality Normal   Respiration Quality Normal   Respiration 14   Systolic 641, LUE, Sitting   Diastolic 80, LUE, Sitting   Height 5 ft 10 in   Weight 222 lb 2 oz   BMI Calculated 31 87   BSA Calculated 2 18   O2 Saturation 98, RA   Pain Scale 5     Results/Data   *VB - PHQ-9 Tool [id number] 10:14AM Josh Garcia      Test Name Result Flag Reference   PHQ-9 Adult Depression Score 10     PHQ-9 Adult Depression Screening Positive          Signatures    Electronically signed by :  Zackery Del Angel DO; Jan 19 2018 10:30AM EST                       (Author)

## 2018-01-22 ENCOUNTER — APPOINTMENT (OUTPATIENT)
Dept: PHYSICAL THERAPY | Facility: CLINIC | Age: 58
End: 2018-01-22
Payer: COMMERCIAL

## 2018-01-22 VITALS
DIASTOLIC BLOOD PRESSURE: 80 MMHG | HEIGHT: 70 IN | WEIGHT: 222.13 LBS | RESPIRATION RATE: 14 BRPM | HEART RATE: 65 BPM | OXYGEN SATURATION: 98 % | BODY MASS INDEX: 31.8 KG/M2 | TEMPERATURE: 97.8 F | SYSTOLIC BLOOD PRESSURE: 112 MMHG

## 2018-01-22 PROCEDURE — 97110 THERAPEUTIC EXERCISES: CPT

## 2018-01-22 PROCEDURE — 97140 MANUAL THERAPY 1/> REGIONS: CPT

## 2018-01-23 NOTE — MISCELLANEOUS
Assessment    1  Arthritis of right knee (716 96) (M17 11)   2  Allergic reaction, initial encounter (995 3) (T78 40XA)    Plan  Allergic reaction, initial encounter    · HydrOXYzine HCl - 25 MG Oral Tablet; TAKE 1 TABLET 3 TIMES DAILY AS NEEDED   Rx By: Dajuan Hays; Dispense: 10 Days ; #:30 Tablet; Refill: 1; For: Allergic reaction, initial encounter; KEON = N; Verified Transmission to [id number] Last Updated By: System, Decision Pace; 12/14/2017 1:55:22 PM    Discussion/Summary  Discussion Summary: This is a transition of care visit for patient who was admitted to [facility] from December 5th to December 7th for elective right total knee arthroplasty performed by Dr Edwin Escobedo  Patient is still getting home physical therapy and making progress  Still has fairly considerable pain for which she is taking oxycodone 5 mg, 1-2 tablets every 4-6 hours along with tramadol on rare occasions for breakthrough  During admission, patient experienced allergic reaction of unknown etiology  He was seen by an allergist an advised to start fexofenadine  This is not helped very much with his itching  Reveals normal looking incision without signs of infection  No signs of DVT  Scattered erythematous papules in the lower jaw upper shoulder area  Recommend: Continue pain meds as directed by Ortho  Will change fexofenadine to hydroxyzine 25 mg t i d  p r n  Recommend adding MiraLax daily as long as he is on oxycodone  Due to prior history provoked DVT that occurred on of prior surgery, recommend continuing warfarin for up to 6 weeks following his surgery (this is being managed by his orthopedic surgeon)  Call further problems  His examination today   Medication SE Review and Pt Understands Tx: Possible side effects of new medications were reviewed with the patient/guardian today  The treatment plan was reviewed with the patient/guardian   The patient/guardian understands and agrees with the treatment plan History of Present Illness  TCM Communication [facility]: The patient is being contacted for follow-up after hospitalization and CASSANDRA visit scheduled for 12/14/17 with [facility] records were not available and Records are pending  He was hospitalized at [facility] [place]  The date of discharge: 12/07/17  Diagnosis: 1  He was discharged with home health services  Medications reviewed and updated today  He scheduled a follow up appointment  The patient is currently asymptomatic  Referrals Needed:  None  Communication performed and completed by   HPI: This is a transition of care visit for patient who was admitted to [facility] from December 5th to December 7th for elective right total knee arthroplasty performed by Dr Jaqueline Burton  Patient is still getting home physical therapy and making progress  Still has fairly considerable pain for which she is taking oxycodone 5 mg, 1-2 tablets every 4-6 hours along with tramadol on rare occasions for breakthrough  During admission, patient experienced allergic reaction of unknown etiology  He was seen by an allergist an advised to start fexofenadine  This is not helped very much with his itching  Review of Systems  Complete-Male:   Constitutional: No fever or chills, feels well, no tiredness, no recent weight gain or weight loss  Cardiovascular: No complaints of slow heart rate, no fast heart rate, no chest pain, no palpitations, no leg claudication, no lower extremity  Respiratory: No complaints of shortness of breath, no wheezing, no cough, no SOB on exertion, no orthopnea or PND  Gastrointestinal: No complaints of abdominal pain, no constipation, no nausea or vomiting, no diarrhea or bloody stools  Genitourinary: No complaints of dysuria, no incontinence, no hesitancy, no nocturia, no genital lesion, no testicular pain  Musculoskeletal: as noted in HPI  Active Problems    1  Arthritis of right knee (616 96) (M17 11)   2   Bilateral hearing loss (389 9) (H91 93)   3  Disc degeneration, lumbar (722 52) (M51 36)   4  Encounter for pre-operative examination (V72 84) (Z01 818)   5  Fatigue (780 79) (R53 83)   6  Food allergy (V15 05) (Z91 018)   7  GERD without esophagitis (530 81) (K21 9)   8  Osteoarthritis of both knees, unspecified osteoarthritis type (715 96) (M17 0)   9  Squamous cell carcinomatosis (199 0) (C80 0)    Past Medical History    1  History of Atypical chest pain (786 59) (R07 89)   2  History of Encounter for screening colonoscopy (V76 51) (Z12 11)   3  History of DVT (deep vein thrombosis) (V12 51) (Z86 718)   4  History of influenza vaccination (V49 89) (Z92 29)   5  History of pulmonary embolism (V12 55) (Z86 711)   6  Denied: History of substance abuse   7  Denied: History of Mental health problem   8  History of Need for immunization against influenza (V04 81) (Z23)    Surgical History    1  History of Arthroscopy Knee Right   2  History of Colonoscopy (Fiberoptic)  Surgical History Reviewed: The surgical history was reviewed and updated today  Family History  Mother    1  Family history of Alzheimer's disease of other onset without behavioral disturbance   2  Denied: Family history of substance abuse   3  Denied: Family history of Mental health problem  Father    4  Family history of Acute congestive heart failure, unspecified congestive heart failure type   5  Denied: Family history of substance abuse   6  Denied: Family history of Mental health problem  Brother    7  Family history of leukemia (V16 6) (Z80 6)  Paternal Grandmother    6  Family history of diabetes mellitus (V18 0) (Z83 3)  Paternal Grandfather    5  Family history of malignant neoplasm (V16 9) (Z80 9)    Social History    · Denied: History of Alcohol use   · Always uses seat belt   · Current some day smoker (305 1) (F17 200)   · No guns in the home   · Primary language is [place]   · Reads English  Social History Reviewed:  The social history was reviewed and updated today  The social history was reviewed and is unchanged  Current Meds   1  Allegra Allergy TABS; Therapy: (Recorded:97Rso0098) to Recorded   2  CeleBREX 200 MG Oral Capsule; TAKE 1 CAPSULE DAILY AS NEEDED; Therapy: 80GVO7440 to Recorded   3  Colace 100 MG Oral Capsule; Therapy: 66TDU2435 to Recorded   4  OxyCODONE HCl - 5 MG Oral Capsule; TAKE 1 CAPSULE EVERY 4 HOURS AS   NEEDED; Therapy: 20DYS2569 to Recorded   5  TraMADol HCl - 50 MG Oral Tablet; Therapy: 41SPR4983 to Recorded  Medication List Reviewed: The medication list was reviewed and updated today  Allergies    1  No Known Drug Allergies    2  Peanuts   3  Soy   4  Wheat    Vitals  Signs   Recorded: 23TVY7429 01:21PM   Temperature: 98 2 F, Tympanic  Heart Rate: 93  Pulse Quality: Normal  Respiration Quality: Normal  Respiration: 14  Systolic: 183, LUE, Sitting  Diastolic: 80, LUE, Sitting  Height: 5 ft 10 in  Weight: 229 lb 4 oz  BMI Calculated: 32 89  BSA Calculated: 2 21  O2 Saturation: 99, RA  Pain Scale: 10    Physical Exam    Constitutional   General appearance: No acute distress, well appearing and well nourished  Pulmonary   Respiratory effort: No increased work of breathing or signs of respiratory distress  Auscultation of lungs: Clear to auscultation, equal breath sounds bilaterally, no wheezes, no rales, no rhonci  Cardiovascular   Palpation of heart: Normal PMI, no thrills  Auscultation of heart: Normal rate and rhythm, normal S1 and S2, without murmurs  Examination of extremities for edema and/or varicosities: Normal     Carotid pulses: Normal     Lymphatic   Palpation of lymph nodes in neck: No lymphadenopathy  Musculoskeletal   Inspection/palpation of joints, bones, and muscles: Abnormal   R knee incision healing well  no signs of infection     Psychiatric   Orientation to person, place and time: Normal     Mood and affect: Normal          Signatures   Electronically signed by : Rupinder Silverman DO; Dec 14 2017  1:58PM EST                       (Author)

## 2018-01-24 VITALS
RESPIRATION RATE: 14 BRPM | DIASTOLIC BLOOD PRESSURE: 80 MMHG | SYSTOLIC BLOOD PRESSURE: 116 MMHG | OXYGEN SATURATION: 99 % | HEART RATE: 93 BPM | WEIGHT: 229.25 LBS | TEMPERATURE: 98.2 F | HEIGHT: 70 IN | BODY MASS INDEX: 32.82 KG/M2

## 2018-01-25 ENCOUNTER — APPOINTMENT (OUTPATIENT)
Dept: PHYSICAL THERAPY | Facility: CLINIC | Age: 58
End: 2018-01-25
Payer: COMMERCIAL

## 2018-01-26 ENCOUNTER — OFFICE VISIT (OUTPATIENT)
Dept: PHYSICAL THERAPY | Facility: CLINIC | Age: 58
End: 2018-01-26
Payer: COMMERCIAL

## 2018-01-26 DIAGNOSIS — Z96.651 PRESENCE OF RIGHT ARTIFICIAL KNEE JOINT: ICD-10-CM

## 2018-01-26 DIAGNOSIS — M17.11 PRIMARY OSTEOARTHRITIS OF RIGHT KNEE: Primary | ICD-10-CM

## 2018-01-26 PROCEDURE — 97140 MANUAL THERAPY 1/> REGIONS: CPT | Performed by: PHYSICAL THERAPIST

## 2018-01-26 PROCEDURE — 97110 THERAPEUTIC EXERCISES: CPT | Performed by: PHYSICAL THERAPIST

## 2018-01-26 NOTE — PROGRESS NOTES
Daily Note     Today's date: 2018  Patient name: Deangelo Minor  : 1960  MRN: [id number]  Referring provider: Les Chakraborty MD  Dx:   Encounter Diagnoses   Name Primary?  Primary osteoarthritis of right knee Yes    Presence of right artificial knee joint                   Subjective:Has been away with work obligations  Followed up with surgeon yesterday  Per patient MD was pleased with progress, no manipulation  Is to continue therapy, will follow up with him in two months  Lex Solomon tells me his pain is improving  Has the most pain when doing home exercises and in bed at night  Pain in bed interrupts sleep  Using ibuprofen prn  Has tylenol with codeine to use should pain worsen  Objective: See treatment diary below  Objective     Passive Range of Motion     Right Knee   Flexion: 116 degrees   Extension: 5 (lacks 5 degrees extension) degrees         Assessment: Tolerated treatment poorly, lited by hamstring and ITband pain  Pain did not seem to be adequately controllled today, pt in agreement with this  Patient exhibited good technqiue with therapeutic exercises  Patient would benefit from continued course of skilled physical therapy to address impairments in an effort to improve function  Plan: Continue per plan of care       Precautions: PMH of DVT and PE    Daily Treatment Diary     Manual              PROM R knee flexion/extension, patellar mobilizations RG            IASTM R distal ITB                                        (15 min)                Exercise Diary              Bike 10 min            Long Sit HS/Gastroc stretch 30 sec x 3            SLR Flexion  1 5# 3 x 10            Heel slides with strap 10sec 2 x 10            Strap Itband stretch 30sec x 3            Step ups NV            Standing Hip ABD 2#2 x 10            Pball squats 3 sec 2 x 10            Light load, long duration stretch for extension with heel prop 10#, 3 min            Self seated AA flexion 2 x 10 Modalities              MH R knee 10 min            Cryo R knee 10 min

## 2018-01-29 ENCOUNTER — OFFICE VISIT (OUTPATIENT)
Dept: PHYSICAL THERAPY | Facility: CLINIC | Age: 58
End: 2018-01-29
Payer: COMMERCIAL

## 2018-01-29 DIAGNOSIS — Z96.651 PRESENCE OF RIGHT ARTIFICIAL KNEE JOINT: ICD-10-CM

## 2018-01-29 DIAGNOSIS — M17.11 PRIMARY OSTEOARTHRITIS OF RIGHT KNEE: Primary | ICD-10-CM

## 2018-01-29 PROCEDURE — 97150 GROUP THERAPEUTIC PROCEDURES: CPT | Performed by: PHYSICAL THERAPIST

## 2018-01-29 PROCEDURE — 97110 THERAPEUTIC EXERCISES: CPT | Performed by: PHYSICAL THERAPIST

## 2018-01-29 PROCEDURE — 97140 MANUAL THERAPY 1/> REGIONS: CPT | Performed by: PHYSICAL THERAPIST

## 2018-01-29 NOTE — PROGRESS NOTES
Daily Note     Today's date: 2018  Patient name: Glen Acuna  : 1960  MRN: [id number]  Referring provider: Jethro Garcia MD  Dx:   No diagnosis found  Subjective:Keep heel propped frequently over the weekend to faciliate extension  Took tylenol with codeine this morning  Objective: See treatment diary below  Objective     Passive Range of Motion     Right Knee   Flexion: 118 degrees   Extension: 1 (lacks 5 degrees extension) degrees         Assessment: Best motion yet since starting therapy  Patient would benefit from continued course of skilled physical therapy to address impairments in an effort to improve function  Plan: Continue per plan of care       Precautions: PMH of DVT and PE    Daily Treatment Diary     Manual             PROM R knee flexion/extension, patellar mobilizations RG RG           IASTM R distal ITB                                        (15 min) (10 min)               Exercise Diary              Bike 10 min 10 min           Long Sit HS/Gastroc stretch 30 sec x 3 30 sec x 3           SLR Flexion  1 5# 3 x 10 1 5#, 3 x 10           Heel slides with strap 10sec 2 x 10 10 sec, 2 x 10           Strap Itband stretch 30sec x 3 NP           Step ups NV 8"  2 x 10           Standing Hip ABD 2#2 x 10 NV           Pball squats 3 sec 2 x 10 3 sec, 2 x 10           Light load, long duration stretch for extension with heel prop 10#, 3 min 10#, 3 min           Self seated AA flexion 2 x 10 X 10                                                                                                                                                 Modalities              MH R knee 10 min 10 min           Cryo R knee 10 min 10 min

## 2018-02-01 ENCOUNTER — OFFICE VISIT (OUTPATIENT)
Dept: PHYSICAL THERAPY | Facility: CLINIC | Age: 58
End: 2018-02-01
Payer: COMMERCIAL

## 2018-02-01 DIAGNOSIS — M17.11 PRIMARY OSTEOARTHRITIS OF RIGHT KNEE: Primary | ICD-10-CM

## 2018-02-01 DIAGNOSIS — Z96.651 PRESENCE OF RIGHT ARTIFICIAL KNEE JOINT: ICD-10-CM

## 2018-02-01 PROCEDURE — 97150 GROUP THERAPEUTIC PROCEDURES: CPT | Performed by: PHYSICAL THERAPIST

## 2018-02-01 PROCEDURE — 97140 MANUAL THERAPY 1/> REGIONS: CPT | Performed by: PHYSICAL THERAPIST

## 2018-02-01 PROCEDURE — 97110 THERAPEUTIC EXERCISES: CPT | Performed by: PHYSICAL THERAPIST

## 2018-02-01 NOTE — PROGRESS NOTES
Daily Note     Today's date: 2018  Patient name: Deangelo Minor  : 1960  MRN: [id number]  Referring provider: Les Chakraborty MD  Dx:   D19 31  V35 414  S/p (r) TKA 17               Subjective:Did a lot of driving yesterday, knee felt stiff  Objective: See treatment diary below  Objective     Passive Range of Motion     Right Knee   Flexion: 120 degrees   Extension: 1 degree     R knee strength  Flexion 4/5  Extension: 4+/5    Good R patellar mobility      Assessment: Excellent range of motion progress this week  Patient would benefit from continued course of skilled physical therapy to address impairments in an effort to improve function  Plan: Continue per plan of care       Precautions: PMH of DVT and PE    Daily Treatment Diary     Manual            PROM R knee flexion/extension, patellar mobilizations RG RG RG          IASTM R distal ITB                                        (15 min) (10 min) (15 min)              Exercise Diary              Bike 10 min 10 min 10min          Long Sit HS/Gastroc stretch 30 sec x 3 30 sec x 3 30 sec x 3          SLR Flexion  1 5# 3 x 10 1 5#, 3 x 10 1 5#, 3 x 10          Heel slides with strap 10sec 2 x 10 10 sec, 2 x 10 10 sec, 2 x 10          Strap Itband stretch 30sec x 3 NP NP          Step ups NV 8"  2 x 10 NP          Standing Hip ABD 2#2 x 10 NV 2#, 2 x 10          Pball squats 3 sec 2 x 10 3 sec, 2 x 10 3 sec, 2 x 10          Light load, long duration stretch for extension with heel prop 10#, 3 min 10#, 3 min 10# 3 min          Self seated AA flexion 2 x 10 X 10 10 sec x 10          LAQ   3#, 2 x 10          CC Tkes   Blue, 5 sec x 30                                                                                                                      Modalities              MH R knee 10 min 10 min 10 min          Cryo R knee 10 min 10 min 10 min

## 2018-02-02 ENCOUNTER — OFFICE VISIT (OUTPATIENT)
Dept: PHYSICAL THERAPY | Facility: CLINIC | Age: 58
End: 2018-02-02
Payer: COMMERCIAL

## 2018-02-02 DIAGNOSIS — M17.11 PRIMARY OSTEOARTHRITIS OF RIGHT KNEE: Primary | ICD-10-CM

## 2018-02-02 DIAGNOSIS — Z96.651 PRESENCE OF RIGHT ARTIFICIAL KNEE JOINT: ICD-10-CM

## 2018-02-02 PROCEDURE — 97140 MANUAL THERAPY 1/> REGIONS: CPT | Performed by: PHYSICAL THERAPIST

## 2018-02-02 PROCEDURE — 97110 THERAPEUTIC EXERCISES: CPT | Performed by: PHYSICAL THERAPIST

## 2018-02-02 NOTE — PROGRESS NOTES
Daily Note     Today's date: 2018  Patient name: Charles Slaughter  : 1960  MRN: [id number]  Referring provider: Jimmy Hooker MD  Dx:   H09 84  Y51 889  S/p (R) TKA 17               Subjective:Pain improved this week, feeling more stiffness vs pain  Objective: See treatment diary below  Objective     Passive Range of Motion     Right Knee   Flexion: 120 degrees   Extension: 0 degree     R knee strength  Flexion 4+/5  Extension: 4+/5    Good R patellar mobility    Gait: Normal on level ground after cueing to increase L terminal knee extension at initial contact  Assessment: Excellent range of motion progress this week  Patient would benefit from continued course of skilled physical therapy to address impairments in an effort to improve function  Plan: Continue per plan of care       Precautions: PMH of DVT and PE    Daily Treatment Diary     Manual   2/2         PROM R knee flexion/extension, patellar mobilizations RG RG RG RG         IASTM R distal ITB                                        (15 min) (10 min) (15 min) (10 min)             Exercise Diary              Bike 10 min 10 min 10min 10 min         Long Sit HS/Gastroc stretch 30 sec x 3 30 sec x 3 30 sec x 3 30 sec x 3         SLR Flexion  1 5# 3 x 10 1 5#, 3 x 10 1 5#, 3 x 10 1 5#, 3 x 10         Heel slides with strap 10sec 2 x 10 10 sec, 2 x 10 10 sec, 2 x 10 10sec 2 x 10         Strap Itband stretch 30sec x 3 NP NP NP         Step ups NV 8"  2 x 10 NP NP         Standing Hip ABD 2#2 x 10 NV 2#, 2 x 10 2#, 2 x 1-         Pball squats 3 sec 2 x 10 3 sec, 2 x 10 3 sec, 2 x 10 3 sec, 2 x 10         Light load, long duration stretch for extension with heel prop 10#, 3 min 10#, 3 min 10# 3 min 10#, 3 min         Self seated AA flexion 2 x 10 X 10 10 sec x 10 10 sec x 10         LAQ   3#, 2 x 10 4 5#, 2 x 10         CC Tkes   Blue, 5 sec x 30 Blue, 5 sec, x 30 Modalities              MH R knee 10 min 10 min 10 min 10 min         Cryo R knee 10 min 10 min 10 min 10 min

## 2018-02-06 ENCOUNTER — OFFICE VISIT (OUTPATIENT)
Dept: PHYSICAL THERAPY | Facility: CLINIC | Age: 58
End: 2018-02-06
Payer: COMMERCIAL

## 2018-02-06 DIAGNOSIS — Z96.651 PRESENCE OF RIGHT ARTIFICIAL KNEE JOINT: ICD-10-CM

## 2018-02-06 DIAGNOSIS — M17.11 PRIMARY OSTEOARTHRITIS OF RIGHT KNEE: Primary | ICD-10-CM

## 2018-02-06 PROCEDURE — 97140 MANUAL THERAPY 1/> REGIONS: CPT | Performed by: PHYSICAL THERAPIST

## 2018-02-06 PROCEDURE — 97110 THERAPEUTIC EXERCISES: CPT | Performed by: PHYSICAL THERAPIST

## 2018-02-06 NOTE — PROGRESS NOTES
Daily Note     Today's date: 2018  Patient name: Taya Dickey  : 1960  MRN: [id number]  Referring provider: Mehran Bowers MD  Dx:   U23 44  S09 148  S/p (R) TKA 17            Subjective: R knee pain improving, sleeping better  Objective: See treatment diary below  Objective     Passive Range of Motion     Right Knee   Flexion: 121 degrees   Extension: lacks 1 degrees before PROM, achieves full extension after PROM    R knee strength  Flexion 4+/5  Extension: 4+/5    Good R patellar mobility    Gait: Normal on level ground after cueing to increase L terminal knee extension at initial contact  Assessment: Patient would benefit from continued course of skilled physical therapy to address impairments in an effort to improve function  Plan: Continue per plan of care       Precautions: PMH of DVT and PE    Daily Treatment Diary     Manual   2/2 2/6        PROM R knee flexion/extension, patellar mobilizations RG RG RG RG RG        IASTM R distal ITB                                        (15 min) (10 min) (15 min) (10 min) (8 min)            Exercise Diary              Bike 10 min 10 min 10min 10 min 10 min        Long Sit HS/Gastroc stretch 30 sec x 3 30 sec x 3 30 sec x 3 30 sec x 3 30 sec x 3        SLR Flexion  1 5# 3 x 10 1 5#, 3 x 10 1 5#, 3 x 10 1 5#, 3 x 10 2#, 3, x 10        Heel slides with strap 10sec 2 x 10 10 sec, 2 x 10 10 sec, 2 x 10 10sec 2 x 10 10 sec, 2 x 10        Strap Itband stretch 30sec x 3 NP NP NP NP        Step ups NV 8"  2 x 10 NP NP NP        Standing Hip ABD 2#2 x 10 NV 2#, 2 x 10 2#, 2 x 1- 2 5#, 2 x 10        Pball squats 3 sec 2 x 10 3 sec, 2 x 10 3 sec, 2 x 10 3 sec, 2 x 10 5sec, 2 x 10        Light load, long duration stretch for extension with heel prop 10#, 3 min 10#, 3 min 10# 3 min 10#, 3 min 10#, 3 min        Self seated AA flexion 2 x 10 X 10 10 sec x 10 10 sec x 10 10 sec x 10        LAQ   3#, 2 x 10 4 5#, 2 x 10 5#, 2 x 10        CC Tkes   Blue, 5 sec x 30 Blue, 5 sec, x 30 Blue, 5 sec, x 30        Lateral tap downs     4", 2 x 10                                                                                                       Modalities              MH R knee 10 min 10 min 10 min 10 min 10 min          Cryo R knee 10 min 10 min 10 min 10 min 10 min

## 2018-02-07 ENCOUNTER — OFFICE VISIT (OUTPATIENT)
Dept: PHYSICAL THERAPY | Facility: CLINIC | Age: 58
End: 2018-02-07
Payer: COMMERCIAL

## 2018-02-07 DIAGNOSIS — M17.11 PRIMARY OSTEOARTHRITIS OF RIGHT KNEE: Primary | ICD-10-CM

## 2018-02-07 DIAGNOSIS — Z96.651 PRESENCE OF RIGHT ARTIFICIAL KNEE JOINT: ICD-10-CM

## 2018-02-07 PROCEDURE — 97110 THERAPEUTIC EXERCISES: CPT | Performed by: PHYSICAL THERAPIST

## 2018-02-07 PROCEDURE — 97140 MANUAL THERAPY 1/> REGIONS: CPT | Performed by: PHYSICAL THERAPIST

## 2018-02-07 NOTE — PROGRESS NOTES
Daily Note     Today's date: 2018  Patient name: Lyssa Castillo  : 1960  MRN: [id number]  Referring provider: Mere Bone MD  Dx:   O09 12  T82 801  S/p (R) TKA 17            Subjective:Patitent reports that over the past two weeks he feels less pain with waking, greater ease with ambulating up/down stairs, improved sleep  Still having    Objective: See treatment diary below      Passive Range of Motion     Right Knee   Flexion: 121 degrees   Extension: lacks 1 degrees before PROM, achieves full extension after PROM    R knee strength  Flexion 4+/5  Extension: 4+/5    Good R patellar mobility    Gait: Still needs frequent cueing to increase terminal knee extension at initial contact  FOTO self rated functional scale score improved to 48 from 39 at time of Ie  Assessment: Patient would benefit from continued course of skilled physical therapy to address impairments in an effort to improve function  Plan: Continue per plan of care       Precautions: PMH of DVT and PE    Daily Treatment Diary     Manual         PROM R knee flexion/extension, patellar mobilizations RG RG RG RG RG RG       IASTM R distal ITB      NP       Manual R groin stretch      RG                     (15 min) (10 min) (15 min) (10 min) (8 min) (10- min)           Exercise Diary              Bike 10 min 10 min 10min 10 min 10 min 10 min       Long Sit HS/Gastroc stretch 30 sec x 3 30 sec x 3 30 sec x 3 30 sec x 3 30 sec x 3 30 sec x 3       SLR Flexion  1 5# 3 x 10 1 5#, 3 x 10 1 5#, 3 x 10 1 5#, 3 x 10 2#, 3, x 10 2#, 2 x 10       Heel slides with strap 10sec 2 x 10 10 sec, 2 x 10 10 sec, 2 x 10 10sec 2 x 10 10 sec, 2 x 10 10 sec, 2 x 10       Strap Itband stretch 30sec x 3 NP NP NP NP        Step ups NV 8"  2 x 10 NP NP NP 8" 2 x 10-       Standing Hip ABD 2#2 x 10 NV 2#, 2 x 10 2#, 2 x 1- 2 5#, 2 x 10 2 5#, 2 x 10       Pball squats 3 sec 2 x 10 3 sec, 2 x 10 3 sec, 2 x 10 3 sec, 2 x 10 5sec, 2 x 10 3 sec, 2 x 10       Light load, long duration stretch for extension with heel prop 10#, 3 min 10#, 3 min 10# 3 min 10#, 3 min 10#, 3 min 10#, 3 min       Self seated AA flexion 2 x 10 X 10 10 sec x 10 10 sec x 10 10 sec x 10 NP       LAQ   3#, 2 x 10 4 5#, 2 x 10 5#, 2 x 10 5#, 2 x 10       CC Tkes   Blue, 5 sec x 30 Blue, 5 sec, x 30 Blue, 5 sec, x 30 Blue, 5 sec, x 30       Lateral tap downs     4", 2 x 10 4", 2 x 10       Tband HS curls      Green, 2 x 10                                                                                         Modalities              MH R knee 10 min 10 min 10 min 10 min 10 min   10 min       Cryo R knee 10 min 10 min 10 min 10 min 10 min 10 min

## 2018-02-09 ENCOUNTER — OFFICE VISIT (OUTPATIENT)
Dept: PHYSICAL THERAPY | Facility: CLINIC | Age: 58
End: 2018-02-09
Payer: COMMERCIAL

## 2018-02-09 DIAGNOSIS — Z96.651 PRESENCE OF RIGHT ARTIFICIAL KNEE JOINT: ICD-10-CM

## 2018-02-09 DIAGNOSIS — M17.11 PRIMARY OSTEOARTHRITIS OF RIGHT KNEE: Primary | ICD-10-CM

## 2018-02-09 PROCEDURE — 97110 THERAPEUTIC EXERCISES: CPT | Performed by: PHYSICAL THERAPIST

## 2018-02-09 PROCEDURE — 97140 MANUAL THERAPY 1/> REGIONS: CPT | Performed by: PHYSICAL THERAPIST

## 2018-02-09 NOTE — PROGRESS NOTES
Daily Note     Today's date: 2018  Patient name: Jim Atkins  : 1960  MRN: [id number]  Referring provider: Justa Shane MD  Dx:   Y11 24  O30 685  S/p (R) TKA 17             Subjective: Pt tells me he has his normal AM stiffness  Objective: See treatment diary below      Passive Range of Motion     Right Knee   Flexion: 115 degrees   Extension: lacks 5 degrees before PROM, lacks 2 degrees extension after PROM    Swelling worsened mildly today  R knee strength  Flexion 4+/5  Extension: 4+/5    Good R patellar mobility        Assessment: Patient would benefit from continued course of skilled physical therapy to address impairments in an effort to improve function  Plan: Continue per plan of care       Precautions: PMH of DVT and PE    Daily Treatment Diary     Manual        PROM R knee flexion/extension, patellar mobilizations RG RG RG RG RG RG RG      IASTM R distal ITB      NP NP      Manual R groin stretch      RG RG                    (15 min) (10 min) (15 min) (10 min) (8 min) (10- min) (10 min)          Exercise Diary              Bike 10 min 10 min 10min 10 min 10 min 10 min 10 min      Long Sit HS/Gastroc stretch 30 sec x 3 30 sec x 3 30 sec x 3 30 sec x 3 30 sec x 3 30 sec x 3 30 sec x 3      SLR Flexion  1 5# 3 x 10 1 5#, 3 x 10 1 5#, 3 x 10 1 5#, 3 x 10 2#, 3, x 10 2#, 2 x 10 2#, 3 x 10      Heel slides with strap 10sec 2 x 10 10 sec, 2 x 10 10 sec, 2 x 10 10sec 2 x 10 10 sec, 2 x 10 10 sec, 2 x 10 10 sec, 2 x 10      Strap Itband stretch 30sec x 3 NP NP NP NP        Step ups NV 8"  2 x 10 NP NP NP 8" 2 x 10- NP      Standing Hip ABD 2#2 x 10 NV 2#, 2 x 10 2#, 2 x 1- 2 5#, 2 x 10 2 5#, 2 x 10 2 5#, 2 x 10      Pball squats 3 sec 2 x 10 3 sec, 2 x 10 3 sec, 2 x 10 3 sec, 2 x 10 5sec, 2 x 10 3 sec, 2 x 10 3 sec, 2 x 10      Light load, long duration stretch for extension with heel prop 10#, 3 min 10#, 3 min 10# 3 min 10#, 3 min 10#, 3 min 10#, 3 min 10#, 4 min      Self seated AA flexion 2 x 10 X 10 10 sec x 10 10 sec x 10 10 sec x 10 NP NP      LAQ   3#, 2 x 10 4 5#, 2 x 10 5#, 2 x 10 5#, 2 x 10 5# 2 x 10      CC Tkes   Blue, 5 sec x 30 Blue, 5 sec, x 30 Blue, 5 sec, x 30 Blue, 5 sec, x 30 Blue, 5 sec x 30      Lateral tap downs     4", 2 x 10 4", 2 x 10 4"   2 x 10      Tband HS curls      Green, 2 x 10 Green, 2 x 10                                                                                        Modalities              MH R knee 10 min 10 min 10 min 10 min 10 min   10 min 10 min      Cryo R knee 10 min 10 min 10 min 10 min 10 min 10 min 10 min

## 2018-02-12 ENCOUNTER — APPOINTMENT (OUTPATIENT)
Dept: PHYSICAL THERAPY | Facility: CLINIC | Age: 58
End: 2018-02-12
Payer: COMMERCIAL

## 2018-02-13 ENCOUNTER — OFFICE VISIT (OUTPATIENT)
Dept: PHYSICAL THERAPY | Facility: CLINIC | Age: 58
End: 2018-02-13
Payer: COMMERCIAL

## 2018-02-13 DIAGNOSIS — Z96.651 PRESENCE OF RIGHT ARTIFICIAL KNEE JOINT: ICD-10-CM

## 2018-02-13 DIAGNOSIS — M17.11 PRIMARY OSTEOARTHRITIS OF RIGHT KNEE: Primary | ICD-10-CM

## 2018-02-13 PROCEDURE — 97110 THERAPEUTIC EXERCISES: CPT | Performed by: PHYSICAL THERAPIST

## 2018-02-13 NOTE — PROGRESS NOTES
Daily Note     Today's date: 2018  Patient name: Rosie Looney  : 1960  MRN: [id number]  Referring provider: Rupinder Weaver MD  Dx:   N62 31  M90 [id number]  S/p (R) TKA 17              Subjective: Has been on feet and specifically up and down stairs a lot over the past 48 hours  He feels his knee is more stiff and swollen  Objective: See treatment diary below      Passive Range of Motion     Right Knee   Flexion: 120 degrees   Extension: lacks 5 degrees before low load, long duration stretching, lacks 1 degree after low load long duration stretching  Has worsened suprapatellar edema today  Good R patellar mobility        Assessment: Nearing completion of flexion goal, having  A harder time maintaining extension  Plan: Continue per plan of care       Precautions: PMH of DVT and PE    Daily Treatment Diary     Manual       PROM R knee flexion/extension, patellar mobilizations RG RG RG RG RG RG RG NP (time)     IASTM R distal ITB      NP NP      Manual R groin stretch      RG RG                    (15 min) (10 min) (15 min) (10 min) (8 min) (10- min) (10 min)          Exercise Diary              Bike 10 min 10 min 10min 10 min 10 min 10 min 10 min 10 min     Long Sit HS/Gastroc stretch 30 sec x 3 30 sec x 3 30 sec x 3 30 sec x 3 30 sec x 3 30 sec x 3 30 sec x 3 30 sec x 3     SLR Flexion  1 5# 3 x 10 1 5#, 3 x 10 1 5#, 3 x 10 1 5#, 3 x 10 2#, 3, x 10 2#, 2 x 10 2#, 3 x 10 NP (time)     Heel slides with strap 10sec 2 x 10 10 sec, 2 x 10 10 sec, 2 x 10 10sec 2 x 10 10 sec, 2 x 10 10 sec, 2 x 10 10 sec, 2 x 10 10 sec, 2 x 10     Strap Itband stretch 30sec x 3 NP NP NP NP        Step ups NV 8"  2 x 10 NP NP NP 8" 2 x 10- NP      Standing Hip ABD 2#2 x 10 NV 2#, 2 x 10 2#, 2 x 1- 2 5#, 2 x 10 2 5#, 2 x 10 2 5#, 2 x 10 3#, 2 x 15     Pball squats 3 sec 2 x 10 3 sec, 2 x 10 3 sec, 2 x 10 3 sec, 2 x 10 5sec, 2 x 10 3 sec, 2 x 10 3 sec, 2 x 10 3 sec, 3 x 10     Light load, long duration stretch for extension with heel prop 10#, 3 min 10#, 3 min 10# 3 min 10#, 3 min 10#, 3 min 10#, 3 min 10#, 4 min 10#, 4 min     Self seated AA flexion 2 x 10 X 10 10 sec x 10 10 sec x 10 10 sec x 10 NP NP      LAQ   3#, 2 x 10 4 5#, 2 x 10 5#, 2 x 10 5#, 2 x 10 5# 2 x 10 5#, 2 x 10     CC Tkes   Blue, 5 sec x 30 Blue, 5 sec, x 30 Blue, 5 sec, x 30 Blue, 5 sec, x 30 Blue, 5 sec x 30 NP (time)     Lateral tap downs     4", 2 x 10 4", 2 x 10 4"   2 x 10 4", 2 x 10     Tband HS curls      Green, 2 x 10 Green, 2 x 10 Blue, 2 x 10                                                                                       Modalities              MH R knee 10 min 10 min 10 min 10 min 10 min   10 min 10 min 5 min     Cryo R knee 10 min 10 min 10 min 10 min 10 min 10 min 10 min NP

## 2018-02-15 ENCOUNTER — OFFICE VISIT (OUTPATIENT)
Dept: PHYSICAL THERAPY | Facility: CLINIC | Age: 58
End: 2018-02-15
Payer: COMMERCIAL

## 2018-02-15 DIAGNOSIS — Z96.651 PRESENCE OF RIGHT ARTIFICIAL KNEE JOINT: ICD-10-CM

## 2018-02-15 DIAGNOSIS — M17.11 PRIMARY OSTEOARTHRITIS OF RIGHT KNEE: Primary | ICD-10-CM

## 2018-02-15 PROCEDURE — 97112 NEUROMUSCULAR REEDUCATION: CPT | Performed by: PHYSICAL THERAPIST

## 2018-02-15 PROCEDURE — 97110 THERAPEUTIC EXERCISES: CPT | Performed by: PHYSICAL THERAPIST

## 2018-02-15 PROCEDURE — 97140 MANUAL THERAPY 1/> REGIONS: CPT | Performed by: PHYSICAL THERAPIST

## 2018-02-15 NOTE — PROGRESS NOTES
Daily Note     Today's date: 2018  Patient name: Ashley Felix  : 1960  MRN: [id number]  Referring provider: Heath Farris MD  Dx:   [id number]  [id number] [id number]  S/p (R) TKA 17              Subjective: Feels knee swelling and stiffness are improved since last visit  Slept well last night  He tells me that it was the first time in a while he was able to sleep without knee aching  Still having a difficult time ambulation down stairs  Objective: See treatment diary below    Active Range of Motion:  Right knee  Flexion: 110 degrees  Extension: lacks 3 degrees    Passive Range of Motion     Right Knee   Flexion: 1201 degrees   Extension: 0 degrees    (+) mild  suprapatellar edema today  Good R patellar mobility        Assessment: Tolerated session well  Hamstring tightness and swelling limit active extension  Gait improves with verbal cueing to focus on achieving as much R knee terminal extension as he can  Plan: Continue per plan of care       Precautions: PMH of DVT and PE    Daily Treatment Diary     Manual  1/26 1/29 2/1 2/2 2/6 2/7 2/9 2/13 2/15    PROM R knee flexion/extension, patellar mobilizations RG RG RG RG RG RG RG NP (time) RG    IASTM R distal ITB      NP NP      Manual R groin stretch      RG RG                    (15 min) (10 min) (15 min) (10 min) (8 min) (10- min) (10 min)  (15 min)        Exercise Diary              Bike 10 min 10 min 10min 10 min 10 min 10 min 10 min 10 min 10 min    Long Sit HS/Gastroc stretch 30 sec x 3 30 sec x 3 30 sec x 3 30 sec x 3 30 sec x 3 30 sec x 3 30 sec x 3 30 sec x 3 30 sec x 3    SLR Flexion  1 5# 3 x 10 1 5#, 3 x 10 1 5#, 3 x 10 1 5#, 3 x 10 2#, 3, x 10 2#, 2 x 10 2#, 3 x 10 NP (time) 2#, 2 x 10    Heel slides with strap 10sec 2 x 10 10 sec, 2 x 10 10 sec, 2 x 10 10sec 2 x 10 10 sec, 2 x 10 10 sec, 2 x 10 10 sec, 2 x 10 10 sec, 2 x 10 10 sec x 10    Strap Itband stretch 30sec x 3 NP NP NP NP    30 sec x 3    Step ups NV 8"  2 x 10 NP NP NP 8" 2 x 10- NP  NP    Standing Hip ABD 2#2 x 10 NV 2#, 2 x 10 2#, 2 x 1- 2 5#, 2 x 10 2 5#, 2 x 10 2 5#, 2 x 10 3#, 2 x 15 3#, 2 x 15    Pball squats 3 sec 2 x 10 3 sec, 2 x 10 3 sec, 2 x 10 3 sec, 2 x 10 5sec, 2 x 10 3 sec, 2 x 10 3 sec, 2 x 10 3 sec, 3 x 10 3 sec x 10    Light load, long duration stretch for extension with heel prop 10#, 3 min 10#, 3 min 10# 3 min 10#, 3 min 10#, 3 min 10#, 3 min 10#, 4 min 10#, 4 min NP    Self seated AA flexion 2 x 10 X 10 10 sec x 10 10 sec x 10 10 sec x 10 NP NP  NP    LAQ   3#, 2 x 10 4 5#, 2 x 10 5#, 2 x 10 5#, 2 x 10 5# 2 x 10 5#, 2 x 10 5#, 3, sec, 3 x 10    CC Tkes   Blue, 5 sec x 30 Blue, 5 sec, x 30 Blue, 5 sec, x 30 Blue, 5 sec, x 30 Blue, 5 sec x 30 NP (time) Blue, 5 sec x 30    Lateral tap downs     4", 2 x 10 4", 2 x 10 4"   2 x 10 4", 2 x 10 4", 3 x 10    Tband HS curls      Green, 2 x 10 Green, 2 x 10 Blue, 2 x 10 Blue, 2 x 15    Marching with movement (balance)         25' x 4    Tandem walk (balance)         25' x 4                                                            Modalities              MH R knee 10 min 10 min 10 min 10 min 10 min   10 min 10 min 5 min 10 min    Cryo R knee 10 min 10 min 10 min 10 min 10 min 10 min 10 min NP 10 min

## 2018-02-19 ENCOUNTER — OFFICE VISIT (OUTPATIENT)
Dept: FAMILY MEDICINE CLINIC | Facility: CLINIC | Age: 58
End: 2018-02-19
Payer: COMMERCIAL

## 2018-02-19 ENCOUNTER — OFFICE VISIT (OUTPATIENT)
Dept: PHYSICAL THERAPY | Facility: CLINIC | Age: 58
End: 2018-02-19
Payer: COMMERCIAL

## 2018-02-19 VITALS
TEMPERATURE: 99 F | WEIGHT: 225.1 LBS | OXYGEN SATURATION: 97 % | RESPIRATION RATE: 16 BRPM | HEIGHT: 71 IN | HEART RATE: 77 BPM | DIASTOLIC BLOOD PRESSURE: 82 MMHG | SYSTOLIC BLOOD PRESSURE: 150 MMHG | BODY MASS INDEX: 31.51 KG/M2

## 2018-02-19 DIAGNOSIS — M17.11 PRIMARY OSTEOARTHRITIS OF RIGHT KNEE: Primary | ICD-10-CM

## 2018-02-19 DIAGNOSIS — F32.A DEPRESSION, UNSPECIFIED DEPRESSION TYPE: Primary | ICD-10-CM

## 2018-02-19 DIAGNOSIS — Z96.651 PRESENCE OF RIGHT ARTIFICIAL KNEE JOINT: ICD-10-CM

## 2018-02-19 PROBLEM — M51.36 DISC DEGENERATION, LUMBAR: Status: ACTIVE | Noted: 2017-01-27

## 2018-02-19 PROBLEM — M51.369 DISC DEGENERATION, LUMBAR: Status: ACTIVE | Noted: 2017-01-27

## 2018-02-19 PROBLEM — M17.0 OSTEOARTHRITIS OF BOTH KNEES: Status: ACTIVE | Noted: 2017-12-05

## 2018-02-19 PROCEDURE — 97140 MANUAL THERAPY 1/> REGIONS: CPT | Performed by: PHYSICAL THERAPIST

## 2018-02-19 PROCEDURE — 99213 OFFICE O/P EST LOW 20 MIN: CPT | Performed by: FAMILY MEDICINE

## 2018-02-19 PROCEDURE — 97112 NEUROMUSCULAR REEDUCATION: CPT | Performed by: PHYSICAL THERAPIST

## 2018-02-19 PROCEDURE — 97110 THERAPEUTIC EXERCISES: CPT | Performed by: PHYSICAL THERAPIST

## 2018-02-19 RX ORDER — IBUPROFEN AND FAMOTIDINE 800; 26.6 MG/1; MG/1
TABLET, COATED ORAL
COMMUNITY
Start: 2018-01-25 | End: 2019-10-30 | Stop reason: ALTCHOICE

## 2018-02-19 RX ORDER — FEXOFENADINE HYDROCHLORIDE 60 MG/1
TABLET, FILM COATED ORAL
COMMUNITY

## 2018-02-19 NOTE — PROGRESS NOTES
Daily Note     Today's date: 2018  Patient name: Coco Felix  : 1960  MRN: [id number]  Referring provider: Juan Chung MD  Dx:   A17 [id number]  C13 209  S/p (R) TKA 17              Subjective: The more he goes up and downs stairs, the more stiff and sore his R knee feels  Today is a good day thus far  Objective: See treatment diary below  Progressed functional strengthening  Active Range of Motion:  Right knee  Flexion: 115 degrees  Extension: lacks 3 degrees    Passive Range of Motion     Right Knee   Flexion: 128 degrees   Extension: 0 degrees    (+) mild  suprapatellar edema today  Good R patellar mobility        Assessment: Tolerated session well  Pain on stairs due to eccentric weakness  Plan: Continue per plan of care       Precautions: PMH of DVT and PE    Daily Treatment Diary     Manual  1/26 1/29 2/1 2/2 2/6 2/7 2/9 2/13 2/15 2/19   PROM R knee flexion/extension, patellar mobilizations RG RG RG RG RG RG RG NP (time) RG Rg, extension and patellar mobilizations   IASTM R distal ITB      NP NP      Manual R groin stretch      RG RG                    (15 min) (10 min) (15 min) (10 min) (8 min) (10- min) (10 min)  (15 min) (10 min)       Exercise Diary              Bike 10 min 10 min 10min 10 min 10 min 10 min 10 min 10 min 10 min 10 min   Long Sit HS/Gastroc stretch 30 sec x 3 30 sec x 3 30 sec x 3 30 sec x 3 30 sec x 3 30 sec x 3 30 sec x 3 30 sec x 3 30 sec x 3 30 sec x 3   SLR Flexion  1 5# 3 x 10 1 5#, 3 x 10 1 5#, 3 x 10 1 5#, 3 x 10 2#, 3, x 10 2#, 2 x 10 2#, 3 x 10 NP (time) 2#, 2 x 10 2# 2 x 10   Heel slides with strap 10sec 2 x 10 10 sec, 2 x 10 10 sec, 2 x 10 10sec 2 x 10 10 sec, 2 x 10 10 sec, 2 x 10 10 sec, 2 x 10 10 sec, 2 x 10 10 sec x 10 10 sec x 10   Strap Itband stretch 30sec x 3 NP NP NP NP    30 sec x 3 30 sec x 3   Step ups NV 8"  2 x 10 NP NP NP 8" 2 x 10- NP  NP    Standing Hip ABD 2#2 x 10 NV 2#, 2 x 10 2#, 2 x 1- 2 5#, 2 x 10 2 5#, 2 x 10 2 5#, 2 x 10 3#, 2 x 15 3#, 2 x 15 NP   Pball squats 3 sec 2 x 10 3 sec, 2 x 10 3 sec, 2 x 10 3 sec, 2 x 10 5sec, 2 x 10 3 sec, 2 x 10 3 sec, 2 x 10 3 sec, 3 x 10 3 sec x 10 3 sec  3 x 10   Light load, long duration stretch for extension with heel prop 10#, 3 min 10#, 3 min 10# 3 min 10#, 3 min 10#, 3 min 10#, 3 min 10#, 4 min 10#, 4 min NP 10#, 4 min   Self seated AA flexion 2 x 10 X 10 10 sec x 10 10 sec x 10 10 sec x 10 NP NP  NP    LAQ   3#, 2 x 10 4 5#, 2 x 10 5#, 2 x 10 5#, 2 x 10 5# 2 x 10 5#, 2 x 10 5#, 3, sec, 3 x 10 5#, 3 x 10   CC Tkes   Blue, 5 sec x 30 Blue, 5 sec, x 30 Blue, 5 sec, x 30 Blue, 5 sec, x 30 Blue, 5 sec x 30 NP (time) Blue, 5 sec x 30 Blue, 5 sec x 30   Lateral tap downs     4", 2 x 10 4", 2 x 10 4"   2 x 10 4", 2 x 10 4", 3 x 10 6", 3 x 10   Tband HS curls      Green, 2 x 10 Green, 2 x 10 Blue, 2 x 10 Blue, 2 x 15 Blue, 2 x 15   Marching with movement (balance)         25' x 4 25' x 4 ymb   Tandem walk (balance)         25' x 4 25' x 4, ymb                                                           Modalities              MH R knee 10 min 10 min 10 min 10 min 10 min   10 min 10 min 5 min 10 min 10 min   Cryo R knee 10 min 10 min 10 min 10 min 10 min 10 min 10 min NP 10 min 10 min

## 2018-02-19 NOTE — ASSESSMENT & PLAN NOTE
Depression improved on sertraline  Continue at 50 mg daily  Patient feels and I agree that if this is circumstantial that he may be able to consider tapering the medication several months after he has fully recovered from his knee surgery and he becomes more active in the spring time    He will return to the office in 3 months for re-evaluation

## 2018-02-19 NOTE — PROGRESS NOTES
Assessment/Plan:    Depression    Depression improved on sertraline  Continue at 50 mg daily  Patient feels and I agree that if this is circumstantial that he may be able to consider tapering the medication several months after he has fully recovered from his knee surgery and he becomes more active in the spring time  He will return to the office in 3 months for re-evaluation       Diagnoses and all orders for this visit:    Depression, unspecified depression type  -     sertraline (ZOLOFT) 50 mg tablet; Take 1 tablet (50 mg total) by mouth daily    Other orders  -     Discontinue: sertraline (ZOLOFT) 50 mg tablet; Take 1 tablet by mouth daily  -     DUEXIS 800-26 6 MG TABS;   -     fexofenadine (ALLEGRA) 60 MG tablet; Take by mouth          Subjective:      Patient ID: Melinda Alvarez is a 62 y o  male  Patient returns to the office to follow-up on his treatment for depression  He has been on sertraline 50 mg for 1 month  Most of his symptoms he believes are related to circumstances such as slow recovery from his knee rehabilitation and some seasonal affective issues  States however that the sertraline seems to be making a difference and he feels better on it  He is having no side effects from the medication  The following portions of the patient's history were reviewed and updated as appropriate: allergies, current medications, past family history, past medical history, past social history, past surgical history and problem list     Review of Systems   Constitutional: Negative  Respiratory: Negative  Cardiovascular: Negative  Gastrointestinal: Negative  Genitourinary: Negative  Musculoskeletal: Negative  Psychiatric/Behavioral: Negative            Objective:      /82 (BP Location: Right arm, Patient Position: Sitting, Cuff Size: Standard)   Pulse 77   Temp 99 °F (37 2 °C) (Tympanic)   Resp 16   Ht 5' 11" (1 803 m)   Wt 102 kg (225 lb 1 6 oz)   SpO2 97%   BMI 31 40 kg/m²          Physical Exam   Constitutional: He is oriented to person, place, and time  He appears well-developed and well-nourished  No distress  HENT:   Head: Normocephalic and atraumatic  Eyes: Conjunctivae are normal  Pupils are equal, round, and reactive to light  Right eye exhibits no discharge  Neck: Normal range of motion  No thyromegaly present  Cardiovascular: Normal rate and regular rhythm  Pulmonary/Chest: Effort normal and breath sounds normal  No respiratory distress  Lymphadenopathy:     He has no cervical adenopathy  Neurological: He is alert and oriented to person, place, and time  Skin: Skin is warm and dry  He is not diaphoretic  Psychiatric: He has a normal mood and affect  His behavior is normal  Judgment and thought content normal    Nursing note and vitals reviewed

## 2018-02-21 ENCOUNTER — OFFICE VISIT (OUTPATIENT)
Dept: PHYSICAL THERAPY | Facility: CLINIC | Age: 58
End: 2018-02-21
Payer: COMMERCIAL

## 2018-02-21 DIAGNOSIS — Z96.651 PRESENCE OF RIGHT ARTIFICIAL KNEE JOINT: ICD-10-CM

## 2018-02-21 DIAGNOSIS — M17.11 PRIMARY OSTEOARTHRITIS OF RIGHT KNEE: Primary | ICD-10-CM

## 2018-02-21 PROCEDURE — 97140 MANUAL THERAPY 1/> REGIONS: CPT | Performed by: PHYSICAL THERAPIST

## 2018-02-21 PROCEDURE — 97110 THERAPEUTIC EXERCISES: CPT | Performed by: PHYSICAL THERAPIST

## 2018-02-21 NOTE — PROGRESS NOTES
Daily Note     Today's date: 2018  Patient name: Ashley Felix  : 1960  MRN: [id number]  Referring provider: Heath Farris MD  Dx:   [id number]  A76 [id number]  S/p (R) TKA 17          Subjective: Sleeping better  Still notes AM knee stiffness, difficulty achieving terminal extension  Objective: See treatment diary below  Progressed functional strengthening  Active Range of Motion:  Right knee  Flexion: 118 degrees  Extension: lacks 2 degrees    Passive Range of Motion     Right Knee   Flexion: 122 degrees   Extension: 0 degrees    Good R patellar mobility        Assessment: Still having difficulty maintaining terminal extension  Pain and function continue to improve  Plan: Continue per plan of care       Precautions: PMH of DVT and PE    Daily Treatment Diary     Manual  1/26 1/29 2/1 2/2 2/6 2/7 2/9 2/13 2/15 2/19   PROM R knee flexion/extension, patellar mobilizations RG RG RG RG RG RG RG NP (time) RG Rg, extension and patellar mobilizations   IASTM R distal ITB      NP NP      Manual R groin stretch      RG RG                    (15 min) (10 min) (15 min) (10 min) (8 min) (10- min) (10 min)  (15 min) (10 min)       Exercise Diary              Bike 10 min 10 min 10min 10 min 10 min 10 min 10 min 10 min 10 min 10 min   Long Sit HS/Gastroc stretch 30 sec x 3 30 sec x 3 30 sec x 3 30 sec x 3 30 sec x 3 30 sec x 3 30 sec x 3 30 sec x 3 30 sec x 3 30 sec x 3   SLR Flexion  1 5# 3 x 10 1 5#, 3 x 10 1 5#, 3 x 10 1 5#, 3 x 10 2#, 3, x 10 2#, 2 x 10 2#, 3 x 10 NP (time) 2#, 2 x 10 2# 2 x 10   Heel slides with strap 10sec 2 x 10 10 sec, 2 x 10 10 sec, 2 x 10 10sec 2 x 10 10 sec, 2 x 10 10 sec, 2 x 10 10 sec, 2 x 10 10 sec, 2 x 10 10 sec x 10 10 sec x 10   Strap Itband stretch 30sec x 3 NP NP NP NP    30 sec x 3 30 sec x 3   Step ups NV 8"  2 x 10 NP NP NP 8" 2 x 10- NP  NP    Standing Hip ABD 2#2 x 10 NV 2#, 2 x 10 2#, 2 x 1- 2 5#, 2 x 10 2 5#, 2 x 10 2 5#, 2 x 10 3#, 2 x 15 3#, 2 x 15 NP   Pball squats 3 sec 2 x 10 3 sec, 2 x 10 3 sec, 2 x 10 3 sec, 2 x 10 5sec, 2 x 10 3 sec, 2 x 10 3 sec, 2 x 10 3 sec, 3 x 10 3 sec x 10 3 sec  3 x 10   Light load, long duration stretch for extension with heel prop 10#, 3 min 10#, 3 min 10# 3 min 10#, 3 min 10#, 3 min 10#, 3 min 10#, 4 min 10#, 4 min NP 10#, 4 min   Self seated AA flexion 2 x 10 X 10 10 sec x 10 10 sec x 10 10 sec x 10 NP NP  NP    LAQ   3#, 2 x 10 4 5#, 2 x 10 5#, 2 x 10 5#, 2 x 10 5# 2 x 10 5#, 2 x 10 5#, 3, sec, 3 x 10 5#, 3 x 10   CC Tkes   Blue, 5 sec x 30 Blue, 5 sec, x 30 Blue, 5 sec, x 30 Blue, 5 sec, x 30 Blue, 5 sec x 30 NP (time) Blue, 5 sec x 30 Blue, 5 sec x 30   Lateral tap downs     4", 2 x 10 4", 2 x 10 4"   2 x 10 4", 2 x 10 4", 3 x 10 6", 3 x 10   Tband HS curls      Green, 2 x 10 Green, 2 x 10 Blue, 2 x 10 Blue, 2 x 15 Blue, 2 x 15   Marching with movement (balance)         25' x 4 25' x 4 ymb   Tandem walk (balance)         25' x 4 25' x 4, ymb                                                           Modalities              MH R knee 10 min 10 min 10 min 10 min 10 min   10 min 10 min 5 min 10 min 10 min   Cryo R knee 10 min 10 min 10 min 10 min 10 min 10 min 10 min NP 10 min 10 min                          Precautions: PMH DVT and PE    Daily Treatment Diary     Manual  2/21            PROM R knee extension, patellar mobilizations RG                                                    (10 min)                Exercise Diary  2/21            Bike 10 min            Long sit Hs/gastroc stretch 30 sec x 3            Light load, long duration knee extension stretch 10#, 4 min            SLR Flexion 2#, x 22 (groin pain)            LAQ 5#, x 30            Tband HS curls Blue, 2 x 15            Standing hip abduction (bilat) 3#, 2 x 15            CC TKES Blue, 3 sec, x 30            Lateral tap downs 6", 2 x 10, 4#, x 10            Pball squats < 90 degrees 5sec, 3 x 10            Slow march with movement (balance) Ymb, 25' x 4            Tandem Walk (balance) YMB, 25' x 4                                                                                                                        Modalities              MH 10 min            Cryo 10 min

## 2018-02-26 ENCOUNTER — APPOINTMENT (OUTPATIENT)
Dept: PHYSICAL THERAPY | Facility: CLINIC | Age: 58
End: 2018-02-26
Payer: COMMERCIAL

## 2018-02-27 ENCOUNTER — OFFICE VISIT (OUTPATIENT)
Dept: PHYSICAL THERAPY | Facility: CLINIC | Age: 58
End: 2018-02-27
Payer: COMMERCIAL

## 2018-02-27 DIAGNOSIS — Z96.651 PRESENCE OF RIGHT ARTIFICIAL KNEE JOINT: ICD-10-CM

## 2018-02-27 DIAGNOSIS — M17.11 PRIMARY OSTEOARTHRITIS OF RIGHT KNEE: Primary | ICD-10-CM

## 2018-02-27 PROCEDURE — 97140 MANUAL THERAPY 1/> REGIONS: CPT | Performed by: PHYSICAL THERAPIST

## 2018-02-27 PROCEDURE — 97110 THERAPEUTIC EXERCISES: CPT | Performed by: PHYSICAL THERAPIST

## 2018-02-27 NOTE — PROGRESS NOTES
Daily Note     Today's date: 2018  Patient name: Ellen Chilel  : 1960  MRN: [id number]  Referring provider: Helene Estrella MD  Dx:   [id number]  [id number]  S/p (R) TKA 17          Subjective: Varying levels of knee stiffness and swellling since last visit  This morning knee "feels good "    Objective: See treatment diary below  Progressed LE strengthening  Active Range of Motion:  Right knee  Flexion: 120 degrees  Extension: lacks 1  degree    Passive Range of Motion     Right Knee   Flexion: 122 degrees   Extension: 0 degrees    Good R patellar mobility  Not all therex performed secondary to pts time constraints  Assessment: Nearing completion of goals  I would like him to have the ability to maintain knee extension before discharge  Plan: Continue per plan of care       Precautions: PMH of DVT and PE    Daily Treatment Diary     Manual  1/26 1/29 2/1 2/2 2/6 2/7 2/9 2/13 2/15 2/19   PROM R knee flexion/extension, patellar mobilizations RG RG RG RG RG RG RG NP (time) RG Rg, extension and patellar mobilizations   IASTM R distal ITB      NP NP      Manual R groin stretch      RG RG                    (15 min) (10 min) (15 min) (10 min) (8 min) (10- min) (10 min)  (15 min) (10 min)       Exercise Diary              Bike 10 min 10 min 10min 10 min 10 min 10 min 10 min 10 min 10 min 10 min   Long Sit HS/Gastroc stretch 30 sec x 3 30 sec x 3 30 sec x 3 30 sec x 3 30 sec x 3 30 sec x 3 30 sec x 3 30 sec x 3 30 sec x 3 30 sec x 3   SLR Flexion  1 5# 3 x 10 1 5#, 3 x 10 1 5#, 3 x 10 1 5#, 3 x 10 2#, 3, x 10 2#, 2 x 10 2#, 3 x 10 NP (time) 2#, 2 x 10 2# 2 x 10   Heel slides with strap 10sec 2 x 10 10 sec, 2 x 10 10 sec, 2 x 10 10sec 2 x 10 10 sec, 2 x 10 10 sec, 2 x 10 10 sec, 2 x 10 10 sec, 2 x 10 10 sec x 10 10 sec x 10   Strap Itband stretch 30sec x 3 NP NP NP NP    30 sec x 3 30 sec x 3   Step ups NV 8"  2 x 10 NP NP NP 8" 2 x 10- NP  NP    Standing Hip ABD 2#2 x 10 NV 2#, 2 x 10 2#, 2 x 1- 2 5#, 2 x 10 2 5#, 2 x 10 2 5#, 2 x 10 3#, 2 x 15 3#, 2 x 15 NP   Pball squats 3 sec 2 x 10 3 sec, 2 x 10 3 sec, 2 x 10 3 sec, 2 x 10 5sec, 2 x 10 3 sec, 2 x 10 3 sec, 2 x 10 3 sec, 3 x 10 3 sec x 10 3 sec  3 x 10   Light load, long duration stretch for extension with heel prop 10#, 3 min 10#, 3 min 10# 3 min 10#, 3 min 10#, 3 min 10#, 3 min 10#, 4 min 10#, 4 min NP 10#, 4 min   Self seated AA flexion 2 x 10 X 10 10 sec x 10 10 sec x 10 10 sec x 10 NP NP  NP    LAQ   3#, 2 x 10 4 5#, 2 x 10 5#, 2 x 10 5#, 2 x 10 5# 2 x 10 5#, 2 x 10 5#, 3, sec, 3 x 10 5#, 3 x 10   CC Tkes   Blue, 5 sec x 30 Blue, 5 sec, x 30 Blue, 5 sec, x 30 Blue, 5 sec, x 30 Blue, 5 sec x 30 NP (time) Blue, 5 sec x 30 Blue, 5 sec x 30   Lateral tap downs     4", 2 x 10 4", 2 x 10 4"   2 x 10 4", 2 x 10 4", 3 x 10 6", 3 x 10   Tband HS curls      Green, 2 x 10 Green, 2 x 10 Blue, 2 x 10 Blue, 2 x 15 Blue, 2 x 15   Marching with movement (balance)         25' x 4 25' x 4 ymb   Tandem walk (balance)         25' x 4 25' x 4, ymb                                                           Modalities              MH R knee 10 min 10 min 10 min 10 min 10 min   10 min 10 min 5 min 10 min 10 min   Cryo R knee 10 min 10 min 10 min 10 min 10 min 10 min 10 min NP 10 min 10 min                          Precautions: PMH DVT and PE    Daily Treatment Diary     Manual  2/21 2/27           PROM R knee extension, patellar mobilizations RG RG                                                   (10 min) (10 min)               Exercise Diary  2/21 2/27           Bike 10 min 10 min           Long sit Hs/gastroc stretch 30 sec x 3 20 sec x 3           Light load, long duration knee extension stretch 10#, 4 min 10# x 4 min           SLR Flexion 2#, x 22 (groin pain) 2#, 2 x 10           LAQ 5#, x 30 6#, x 30           Tband HS curls Blue, 2 x 15 Blue, 2 x 15           Standing hip abduction (bilat) 3#, 2 x 15 3#, 2 x 15           CC TKES Blue, 3 sec, x 30 NP           Lateral tap downs 6", 2 x 10, 4#, x 10 NP           Pball squats < 90 degrees 5sec, 3 x 10 NP           Slow march with movement (balance) Ymb, 25' x 4 NP           Tandem Walk (balance) YMB, 25' x 4 NP                                                                                                                       Modalities              MH 10 min 10 min           Cryo 10 min 10 min

## 2018-03-01 ENCOUNTER — OFFICE VISIT (OUTPATIENT)
Dept: PHYSICAL THERAPY | Facility: CLINIC | Age: 58
End: 2018-03-01
Payer: COMMERCIAL

## 2018-03-01 DIAGNOSIS — Z96.651 PRESENCE OF RIGHT ARTIFICIAL KNEE JOINT: ICD-10-CM

## 2018-03-01 DIAGNOSIS — M17.11 PRIMARY OSTEOARTHRITIS OF RIGHT KNEE: Primary | ICD-10-CM

## 2018-03-01 PROCEDURE — 97110 THERAPEUTIC EXERCISES: CPT | Performed by: PHYSICAL THERAPIST

## 2018-03-01 NOTE — PROGRESS NOTES
Daily Note     Today's date: 2018  Patient name: Melinda Alvarez  : 1960  MRN: [id number]  Referring provider: Esther Lesch, MD  Dx:   U26 03  [id number]  S/p (R) TKA 17          Subjective: On long car trip yesterday, knee felt stiff last night  "not so bad," this Am "    Objective: See treatment diary below  Progressed LE strengthening  Active Range of Motion:  Right knee  Flexion: 120 degrees  Extension: lacks 1  degree    Passive Range of Motion     Right Knee   Flexion: 122 degrees   Extension: 0 degrees    Good R patellar mobility    Gait: Lacks terminal extension on R before PROM, gait normal after PROM  Assessment: Better  R eccentric quad strength noted with tap downs today  I would like him to have the ability to maintain knee extension before discharge(hopefully within 2-3 weeks)  Plan: Continue per plan of care       Precautions: PMH of DVT and PE    Daily Treatment Diary     Manual  1/26 1/29 2/1 2/2 2/6 2/7 2/9 2/13 2/15 2/19   PROM R knee flexion/extension, patellar mobilizations RG RG RG RG RG RG RG NP (time) RG Rg, extension and patellar mobilizations   IASTM R distal ITB      NP NP      Manual R groin stretch      RG RG                    (15 min) (10 min) (15 min) (10 min) (8 min) (10- min) (10 min)  (15 min) (10 min)       Exercise Diary              Bike 10 min 10 min 10min 10 min 10 min 10 min 10 min 10 min 10 min 10 min   Long Sit HS/Gastroc stretch 30 sec x 3 30 sec x 3 30 sec x 3 30 sec x 3 30 sec x 3 30 sec x 3 30 sec x 3 30 sec x 3 30 sec x 3 30 sec x 3   SLR Flexion  1 5# 3 x 10 1 5#, 3 x 10 1 5#, 3 x 10 1 5#, 3 x 10 2#, 3, x 10 2#, 2 x 10 2#, 3 x 10 NP (time) 2#, 2 x 10 2# 2 x 10   Heel slides with strap 10sec 2 x 10 10 sec, 2 x 10 10 sec, 2 x 10 10sec 2 x 10 10 sec, 2 x 10 10 sec, 2 x 10 10 sec, 2 x 10 10 sec, 2 x 10 10 sec x 10 10 sec x 10   Strap Itband stretch 30sec x 3 NP NP NP NP    30 sec x 3 30 sec x 3   Step ups NV 8"  2 x 10 NP NP NP 8" 2 x 10- NP  NP Standing Hip ABD 2#2 x 10 NV 2#, 2 x 10 2#, 2 x 1- 2 5#, 2 x 10 2 5#, 2 x 10 2 5#, 2 x 10 3#, 2 x 15 3#, 2 x 15 NP   Pball squats 3 sec 2 x 10 3 sec, 2 x 10 3 sec, 2 x 10 3 sec, 2 x 10 5sec, 2 x 10 3 sec, 2 x 10 3 sec, 2 x 10 3 sec, 3 x 10 3 sec x 10 3 sec  3 x 10   Light load, long duration stretch for extension with heel prop 10#, 3 min 10#, 3 min 10# 3 min 10#, 3 min 10#, 3 min 10#, 3 min 10#, 4 min 10#, 4 min NP 10#, 4 min   Self seated AA flexion 2 x 10 X 10 10 sec x 10 10 sec x 10 10 sec x 10 NP NP  NP    LAQ   3#, 2 x 10 4 5#, 2 x 10 5#, 2 x 10 5#, 2 x 10 5# 2 x 10 5#, 2 x 10 5#, 3, sec, 3 x 10 5#, 3 x 10   CC Tkes   Blue, 5 sec x 30 Blue, 5 sec, x 30 Blue, 5 sec, x 30 Blue, 5 sec, x 30 Blue, 5 sec x 30 NP (time) Blue, 5 sec x 30 Blue, 5 sec x 30   Lateral tap downs     4", 2 x 10 4", 2 x 10 4"   2 x 10 4", 2 x 10 4", 3 x 10 6", 3 x 10   Tband HS curls      Green, 2 x 10 Green, 2 x 10 Blue, 2 x 10 Blue, 2 x 15 Blue, 2 x 15   Marching with movement (balance)         25' x 4 25' x 4 ymb   Tandem walk (balance)         25' x 4 25' x 4, ymb                                                           Modalities              MH R knee 10 min 10 min 10 min 10 min 10 min   10 min 10 min 5 min 10 min 10 min   Cryo R knee 10 min 10 min 10 min 10 min 10 min 10 min 10 min NP 10 min 10 min                          Precautions: PMH DVT and PE    Daily Treatment Diary     Manual  2/21 2/27 3/1          PROM R knee extension, patellar mobilizations RG RG RG                                                  (10 min) (10 min) 5 min              Exercise Diary  2/21 2/27 3/1          Bike 10 min 10 min 10 min          Long sit Hs/gastroc stretch 30 sec x 3 20 sec x 3 30 sec x 3          Light load, long duration knee extension stretch 10#, 4 min 10# x 4 min 10# x 4 min          SLR Flexion 2#, x 22 (groin pain) 2#, 2 x 10 2#, 2 x 10          LAQ 5#, x 30 6#, x 30 6#, x 30          Tband HS curls Blue, 2 x 15 Blue, 2 x 15 Blue, 2 x 15          Standing hip abduction (bilat) 3#, 2 x 15 3#, 2 x 15 3#, 2 x 15          CC TKES Blue, 3 sec, x 30 NP NP          Lateral tap downs 6", 2 x 10, 4#, x 10 NP 6" 3 x 10          Pball squats < 90 degrees 5sec, 3 x 10 5 sec x 30 5esc x 30          Slow march with movement (balance) Ymb, 25' x 4 YMB 25' x 4 YNB 25' x 4          Tandem Walk (balance) YMB, 25' x 4 YMB 25' x 4 YMB 25' x 4          Side step (band around waist)   Black 25' x 5                                                                                                         Modalities    3/1          MH 10 min 10 min 10 min          Cryo 10 min 10 min 10 min

## 2018-03-05 ENCOUNTER — OFFICE VISIT (OUTPATIENT)
Dept: PHYSICAL THERAPY | Facility: CLINIC | Age: 58
End: 2018-03-05
Payer: COMMERCIAL

## 2018-03-05 DIAGNOSIS — M17.11 PRIMARY OSTEOARTHRITIS OF RIGHT KNEE: Primary | ICD-10-CM

## 2018-03-05 DIAGNOSIS — Z96.651 PRESENCE OF RIGHT ARTIFICIAL KNEE JOINT: ICD-10-CM

## 2018-03-05 PROCEDURE — 97110 THERAPEUTIC EXERCISES: CPT | Performed by: PHYSICAL THERAPIST

## 2018-03-05 NOTE — PROGRESS NOTES
Daily Note     Today's date: 2018  Patient name: Anh Reilly  : 1960  MRN: [id number]  Referring provider: Larisa Branham MD  Dx:   [id number]  [id number] [phone]  S/p (R) TKA 17          Subjective: Sat in bleachers all weekend  Knee felt stiff with this, but feeling better this Am  He is sleeping better  Objective: See treatment diary below  Therex limited by pts time constraints  Active Range of Motion:  Right knee  Flexion: 120 degrees  Extension: lacks 1  degree    Passive Range of Motion     Right Knee   Flexion: 122 degrees   Extension: 0 degrees    Good R patellar mobility    Has 5/ R knee flexion and extension strength    Assessment: Had an easier/quicker times achieving full extension today  Plan: Continue per plan of care  Still planning on discharge within 2 weeks      Precautions: PMH of DVT and PE    Daily Treatment Diary     Manual  1/26 1/29 2/1 2/2 2/6 2/7 2/9 2/13 2/15 2/19   PROM R knee flexion/extension, patellar mobilizations RG RG RG RG RG RG RG NP (time) RG Rg, extension and patellar mobilizations   IASTM R distal ITB      NP NP      Manual R groin stretch      RG RG                    (15 min) (10 min) (15 min) (10 min) (8 min) (10- min) (10 min)  (15 min) (10 min)       Exercise Diary              Bike 10 min 10 min 10min 10 min 10 min 10 min 10 min 10 min 10 min 10 min   Long Sit HS/Gastroc stretch 30 sec x 3 30 sec x 3 30 sec x 3 30 sec x 3 30 sec x 3 30 sec x 3 30 sec x 3 30 sec x 3 30 sec x 3 30 sec x 3   SLR Flexion  1 5# 3 x 10 1 5#, 3 x 10 1 5#, 3 x 10 1 5#, 3 x 10 2#, 3, x 10 2#, 2 x 10 2#, 3 x 10 NP (time) 2#, 2 x 10 2# 2 x 10   Heel slides with strap 10sec 2 x 10 10 sec, 2 x 10 10 sec, 2 x 10 10sec 2 x 10 10 sec, 2 x 10 10 sec, 2 x 10 10 sec, 2 x 10 10 sec, 2 x 10 10 sec x 10 10 sec x 10   Strap Itband stretch 30sec x 3 NP NP NP NP    30 sec x 3 30 sec x 3   Step ups NV 8"  2 x 10 NP NP NP 8" 2 x 10- NP  NP    Standing Hip ABD 2#2 x 10 NV 2#, 2 x 10 2#, 2 x 1- 2 5#, 2 x 10 2 5#, 2 x 10 2 5#, 2 x 10 3#, 2 x 15 3#, 2 x 15 NP   Pball squats 3 sec 2 x 10 3 sec, 2 x 10 3 sec, 2 x 10 3 sec, 2 x 10 5sec, 2 x 10 3 sec, 2 x 10 3 sec, 2 x 10 3 sec, 3 x 10 3 sec x 10 3 sec  3 x 10   Light load, long duration stretch for extension with heel prop 10#, 3 min 10#, 3 min 10# 3 min 10#, 3 min 10#, 3 min 10#, 3 min 10#, 4 min 10#, 4 min NP 10#, 4 min   Self seated AA flexion 2 x 10 X 10 10 sec x 10 10 sec x 10 10 sec x 10 NP NP  NP    LAQ   3#, 2 x 10 4 5#, 2 x 10 5#, 2 x 10 5#, 2 x 10 5# 2 x 10 5#, 2 x 10 5#, 3, sec, 3 x 10 5#, 3 x 10   CC Tkes   Blue, 5 sec x 30 Blue, 5 sec, x 30 Blue, 5 sec, x 30 Blue, 5 sec, x 30 Blue, 5 sec x 30 NP (time) Blue, 5 sec x 30 Blue, 5 sec x 30   Lateral tap downs     4", 2 x 10 4", 2 x 10 4"   2 x 10 4", 2 x 10 4", 3 x 10 6", 3 x 10   Tband HS curls      Green, 2 x 10 Green, 2 x 10 Blue, 2 x 10 Blue, 2 x 15 Blue, 2 x 15   Marching with movement (balance)         25' x 4 25' x 4 ymb   Tandem walk (balance)         25' x 4 25' x 4, ymb                                                           Modalities              MH R knee 10 min 10 min 10 min 10 min 10 min   10 min 10 min 5 min 10 min 10 min   Cryo R knee 10 min 10 min 10 min 10 min 10 min 10 min 10 min NP 10 min 10 min                          Precautions: PMH DVT and PE    Daily Treatment Diary     Manual  2/21 2/27 3/1 3/5         PROM R knee extension, patellar mobilizations RG RG RG RG                                                 (10 min) (10 min) 5 min 5 min             Exercise Diary  2/21 2/27 3/1 3/5         Bike 10 min 10 min 10 min 10 min         Long sit Hs/gastroc stretch 30 sec x 3 20 sec x 3 30 sec x 3 30 sec x 3         Light load, long duration knee extension stretch 10#, 4 min 10# x 4 min 10# x 4 min 10#, 4 min         SLR Flexion 2#, x 22 (groin pain) 2#, 2 x 10 2#, 2 x 10 NP         LAQ 5#, x 30 6#, x 30 6#, x 30 NP         Tband HS curls Blue, 2 x 15 Blue, 2 x 15 Blue, 2 x 15 Blue,2  x15 Standing hip abduction (bilat) 3#, 2 x 15 3#, 2 x 15 3#, 2 x 15 NP         CC TKES Blue, 3 sec, x 30 NP NP NP         Lateral tap downs 6", 2 x 10, 4#, x 10 NP 6" 3 x 10 NP         Pball squats < 90 degrees 5sec, 3 x 10 5 sec x 30 5esc x 30 NP         Slow march with movement (balance) Ymb, 25' x 4 YMB 25' x 4 YNB 25' x 4 NP         Tandem Walk (balance) YMB, 25' x 4 YMB 25' x 4 YMB 25' x 4 NP         Side step (band around waist)   Black 25' x 5 NP                                                                                                        Modalities    3/1          MH 10 min 10 min 10 min          Cryo 10 min 10 min 10 min

## 2018-03-07 ENCOUNTER — APPOINTMENT (OUTPATIENT)
Dept: PHYSICAL THERAPY | Facility: CLINIC | Age: 58
End: 2018-03-07
Payer: COMMERCIAL

## 2018-03-09 ENCOUNTER — OFFICE VISIT (OUTPATIENT)
Dept: PHYSICAL THERAPY | Facility: CLINIC | Age: 58
End: 2018-03-09
Payer: COMMERCIAL

## 2018-03-09 DIAGNOSIS — M17.11 PRIMARY OSTEOARTHRITIS OF RIGHT KNEE: Primary | ICD-10-CM

## 2018-03-09 DIAGNOSIS — Z96.651 PRESENCE OF RIGHT ARTIFICIAL KNEE JOINT: ICD-10-CM

## 2018-03-09 PROCEDURE — 97110 THERAPEUTIC EXERCISES: CPT | Performed by: PHYSICAL THERAPIST

## 2018-03-09 PROCEDURE — 97150 GROUP THERAPEUTIC PROCEDURES: CPT | Performed by: PHYSICAL THERAPIST

## 2018-03-09 NOTE — LETTER
[id number]    MD Adelso Salgdao [address] [place] [address]    Patient: Jackie Hall   YOB: 1960   Date of Visit: 3/9/2018     Encounter Diagnosis     ICD-10-CM    1  Primary osteoarthritis of right knee M17 11    2  Presence of right artificial knee joint [id number] [id number]        Dear Dr Tevin Pinedo:    Please review the attached Plan of Care from [facility] recent visit  Please verify that you agree therapy should continue by signing the attached document and sending it back to our office  If you have any questions or concerns, please don't hesitate to call  Sincerely,    Krystin Armijo PT      Referring Provider:      I certify that I have read the below Plan of Care and certify the need for these services furnished under this plan of treatment while under my care  MD Adelso Salgado [address]: [phone]          Daily Note     Today's date: 2018  Patient name: Jackie Hall  : 1960  MRN: [id number]  Referring provider: Veto Horowitz MD  Dx:   O21 12  [id number]  S/p (R) TKA 17          Subjective: Over the past month Maria A Alexandra feels his knee motion and leg strength have improved  Noting decreased pain  Lateral knee pain in area of distal Itband has fully resolved, has intermittent medial knee pains with stair ambulation and periods of prolonged sitting  Objective: See treatment diary below  Active Range of Motion:  Right knee  Flexion: 120 degrees  Extension: lacks 1  degree    Passive Range of Motion     Right Knee   Flexion: 122 degrees   Extension: 0 degrees    Has minimal R knee edema  Good R patellar mobility    Has 5/5 R knee flexion and extension strength  Has 5/5 R hip strength except for Abd: 4+/5  Has 5/5 R ankle strength    Gait normalizes with cueing to increased terminal extension    Reciprocal gait on stairs with rail      Assessment:   Over the past month pt has made the following progress towards goals:  1) Decreased pain  2) Improved range of motion  3) Improved strength  4) Improved self rated functional score  Making good progress, still with mild  Functional weakness and difficulty maintaining full extension  Recommend continued short course of therapy, see updated goals below  Plan: Continue per plan of care  1-2x's per week x 2-4 weeks  Daily Treatment Diary     Manual  1/26 1/29 2/1 2/2 2/6 2/7 2/9 2/13 2/15 2/19   PROM R knee flexion/extension, patellar mobilizations RG RG RG RG Rn 2 weeks  Precautions: PMH of DVT and PE    G RG RG NP (time) RG Rg, extension and patellar mobilizations   IASTM R distal ITB      NP NP      Manual R groin stretch      RG RG                    (15 min) (10 min) (15 min) (10 min) (8 min) (10- min) (10 min)  (15 min) (10 min)       Exercise Diary              Bike 10 min 10 min 10min 10 min 10 min 10 min 10 min 10 min 10 min 10 min   Long Sit HS/Gastroc stretch 30 sec x 3 30 sec x 3 30 sec x 3 30 sec x 3 30 sec x 3 30 sec x 3 30 sec x 3 30 sec x 3 30 sec x 3 30 sec x 3   SLR Flexion  1 5# 3 x 10 1 5#, 3 x 10 1 5#, 3 x 10 1 5#, 3 x 10 2#, 3, x 10 2#, 2 x 10 2#, 3 x 10 NP (time) 2#, 2 x 10 2# 2 x 10   Heel slides with strap 10sec 2 x 10 10 sec, 2 x 10 10 sec, 2 x 10 10sec 2 x 10 10 sec, 2 x 10 10 sec, 2 x 10 10 sec, 2 x 10 10 sec, 2 x 10 10 sec x 10 10 sec x 10   Strap Itband stretch 30sec x 3 NP NP NP NP    30 sec x 3 30 sec x 3   Step ups NV 8"  2 x 10 NP NP NP 8" 2 x 10- NP  NP    Standing Hip ABD 2#2 x 10 NV 2#, 2 x 10 2#, 2 x 1- 2 5#, 2 x 10 2 5#, 2 x 10 2 5#, 2 x 10 3#, 2 x 15 3#, 2 x 15 NP   Pball squats 3 sec 2 x 10 3 sec, 2 x 10 3 sec, 2 x 10 3 sec, 2 x 10 5sec, 2 x 10 3 sec, 2 x 10 3 sec, 2 x 10 3 sec, 3 x 10 3 sec x 10 3 sec   3 x 10   Light load, long duration stretch for extension with heel prop 10#, 3 min 10#, 3 min 10# 3 min 10#, 3 min 10#, 3 min 10#, 3 min 10#, 4 min 10#, 4 min NP 10#, 4 min   Self seated AA flexion 2 x 10 X 10 10 sec x 10 10 sec x 10 10 sec x 10 NP NP  NP    LAQ   3#, 2 x 10 4 5#, 2 x 10 5#, 2 x 10 5#, 2 x 10 5# 2 x 10 5#, 2 x 10 5#, 3, sec, 3 x 10 5#, 3 x 10   CC Tkes   Blue, 5 sec x 30 Blue, 5 sec, x 30 Blue, 5 sec, x 30 Blue, 5 sec, x 30 Blue, 5 sec x 30 NP (time) Blue, 5 sec x 30 Blue, 5 sec x 30   Lateral tap downs     4", 2 x 10 4", 2 x 10 4"   2 x 10 4", 2 x 10 4", 3 x 10 6", 3 x 10   Tband HS curls      Green, 2 x 10 Green, 2 x 10 Blue, 2 x 10 Blue, 2 x 15 Blue, 2 x 15   Marching with movement (balance)         25' x 4 25' x 4 ymb   Tandem walk (balance)         25' x 4 25' x 4, ymb                                                           Modalities              MH R knee 10 min 10 min 10 min 10 min 10 min   10 min 10 min 5 min 10 min 10 min   Cryo R knee 10 min 10 min 10 min 10 min 10 min 10 min 10 min NP 10 min 10 min                          Precautions: PMH DVT and PE    Daily Treatment Diary     Manual  2/21 2/27 3/1 3/5 3/9        PROM R knee extension, patellar mobilizations RG RG RG RG RG                                                (10 min) (10 min) 5 min 5 min 5 min            Exercise Diary  2/21 2/27 3/1 3/5 3/9        Bike 10 min 10 min 10 min 10 min 10 min        Long sit Hs/gastroc stretch 30 sec x 3 20 sec x 3 30 sec x 3 30 sec x 3 30 sec x 3        Light load, long duration knee extension stretch 10#, 4 min 10# x 4 min 10# x 4 min 10#, 4 min 10# x 4 min        SLR Flexion 2#, x 22 (groin pain) 2#, 2 x 10 2#, 2 x 10 NP 3#, 2 x 10        LAQ 5#, x 30 6#, x 30 6#, x 30 NP 6#, x 30        Tband HS curls Blue, 2 x 15 Blue, 2 x 15 Blue, 2 x 15 Blue,2  x15 Black, 2 x 15        Standing hip abduction (bilat) 3#, 2 x 15 3#, 2 x 15 3#, 2 x 15 NP 3# , 2  10        CC TKES Blue, 3 sec, x 30 NP NP NP NP        Lateral tap downs 6", 2 x 10, 4#, x 10 NP 6" 3 x 10 NP 6" 3 x 10        Pball squats < 90 degrees 5sec, 3 x 10 5 sec x 30 5esc x 30 NP 5 sec x 30        Slow march with movement (balance) Ymb, 25' x 4 YMB 25' x 4 YNB 25' x 4 NP YMB 25' x 4        Tandem Walk (balance) YMB, 25' x 4 YMB 25' x 4 YMB 25' x 4 NP YMB 25' x 4        Side step (band around waist)   Black 25' x 5 NP Black 25' x 5                                                                                                       Modalities    3/1          MH 10 min 10 min 10 min  10 min        Cryo 10 min 10 min 10 min  10 min

## 2018-03-09 NOTE — PROGRESS NOTES
Daily Note     Today's date: 2018  Patient name: Chano Casas  : 1960  MRN: [id number]  Referring provider: Lloyd Ann MD  Dx:   T83 54  [id number]  S/p (R) TKA 17          Subjective: Over the past month Lito Scruggs feels his knee motion and leg strength have improved  Noting decreased pain  Lateral knee pain in area of distal Itband has fully resolved, has intermittent medial knee pains with stair ambulation and periods of prolonged sitting  Objective: See treatment diary below  Active Range of Motion:  Right knee  Flexion: 120 degrees  Extension: lacks 1  degree    Passive Range of Motion     Right Knee   Flexion: 122 degrees   Extension: 0 degrees    Has minimal R knee edema  Good R patellar mobility    Has 5/5 R knee flexion and extension strength  Has 5/5 R hip strength except for Abd: 4+/5  Has 5/5 R ankle strength    Gait normalizes with cueing to increased terminal extension  Reciprocal gait on stairs with rail      Assessment:   Over the past month pt has made the following progress towards goals:  1) Decreased pain  2) Improved range of motion  3) Improved strength  4) Improved self rated functional score  Making good progress, still with mild  Functional weakness and difficulty maintaining full extension  Recommend continued short course of therapy, see updated goals below  Plan: Continue per plan of care  1-2x's per week x 2-4 weeks  Daily Treatment Diary     Manual  /2 2/6 2/7 2/9 2/13 2/15 2/19   PROM R knee flexion/extension, patellar mobilizations RG RG RG RG Rn 2 weeks      Precautions: PMH of DVT and PE    G RG RG NP (time) RG Rg, extension and patellar mobilizations   IASTM R distal ITB      NP NP      Manual R groin stretch      RG RG                    (15 min) (10 min) (15 min) (10 min) (8 min) (10- min) (10 min)  (15 min) (10 min)       Exercise Diary              Bike 10 min 10 min 10min 10 min 10 min 10 min 10 min 10 min 10 min 10 min   Long Sit HS/Gastroc stretch 30 sec x 3 30 sec x 3 30 sec x 3 30 sec x 3 30 sec x 3 30 sec x 3 30 sec x 3 30 sec x 3 30 sec x 3 30 sec x 3   SLR Flexion  1 5# 3 x 10 1 5#, 3 x 10 1 5#, 3 x 10 1 5#, 3 x 10 2#, 3, x 10 2#, 2 x 10 2#, 3 x 10 NP (time) 2#, 2 x 10 2# 2 x 10   Heel slides with strap 10sec 2 x 10 10 sec, 2 x 10 10 sec, 2 x 10 10sec 2 x 10 10 sec, 2 x 10 10 sec, 2 x 10 10 sec, 2 x 10 10 sec, 2 x 10 10 sec x 10 10 sec x 10   Strap Itband stretch 30sec x 3 NP NP NP NP    30 sec x 3 30 sec x 3   Step ups NV 8"  2 x 10 NP NP NP 8" 2 x 10- NP  NP    Standing Hip ABD 2#2 x 10 NV 2#, 2 x 10 2#, 2 x 1- 2 5#, 2 x 10 2 5#, 2 x 10 2 5#, 2 x 10 3#, 2 x 15 3#, 2 x 15 NP   Pball squats 3 sec 2 x 10 3 sec, 2 x 10 3 sec, 2 x 10 3 sec, 2 x 10 5sec, 2 x 10 3 sec, 2 x 10 3 sec, 2 x 10 3 sec, 3 x 10 3 sec x 10 3 sec  3 x 10   Light load, long duration stretch for extension with heel prop 10#, 3 min 10#, 3 min 10# 3 min 10#, 3 min 10#, 3 min 10#, 3 min 10#, 4 min 10#, 4 min NP 10#, 4 min   Self seated AA flexion 2 x 10 X 10 10 sec x 10 10 sec x 10 10 sec x 10 NP NP  NP    LAQ   3#, 2 x 10 4 5#, 2 x 10 5#, 2 x 10 5#, 2 x 10 5# 2 x 10 5#, 2 x 10 5#, 3, sec, 3 x 10 5#, 3 x 10   CC Tkes   Blue, 5 sec x 30 Blue, 5 sec, x 30 Blue, 5 sec, x 30 Blue, 5 sec, x 30 Blue, 5 sec x 30 NP (time) Blue, 5 sec x 30 Blue, 5 sec x 30   Lateral tap downs     4", 2 x 10 4", 2 x 10 4"   2 x 10 4", 2 x 10 4", 3 x 10 6", 3 x 10   Tband HS curls      Green, 2 x 10 Green, 2 x 10 Blue, 2 x 10 Blue, 2 x 15 Blue, 2 x 15   Marching with movement (balance)         25' x 4 25' x 4 ymb   Tandem walk (balance)         25' x 4 25' x 4, ymb                                                           Modalities              MH R knee 10 min 10 min 10 min 10 min 10 min   10 min 10 min 5 min 10 min 10 min   Cryo R knee 10 min 10 min 10 min 10 min 10 min 10 min 10 min NP 10 min 10 min                          Precautions: PMH DVT and PE    Daily Treatment Diary     Manual 2/21 2/27 3/1 3/5 3/9        PROM R knee extension, patellar mobilizations RG RG RG RG RG                                                (10 min) (10 min) 5 min 5 min 5 min            Exercise Diary  2/21 2/27 3/1 3/5 3/9        Bike 10 min 10 min 10 min 10 min 10 min        Long sit Hs/gastroc stretch 30 sec x 3 20 sec x 3 30 sec x 3 30 sec x 3 30 sec x 3        Light load, long duration knee extension stretch 10#, 4 min 10# x 4 min 10# x 4 min 10#, 4 min 10# x 4 min        SLR Flexion 2#, x 22 (groin pain) 2#, 2 x 10 2#, 2 x 10 NP 3#, 2 x 10        LAQ 5#, x 30 6#, x 30 6#, x 30 NP 6#, x 30        Tband HS curls Blue, 2 x 15 Blue, 2 x 15 Blue, 2 x 15 Blue,2  x15 Black, 2 x 15        Standing hip abduction (bilat) 3#, 2 x 15 3#, 2 x 15 3#, 2 x 15 NP 3# , 2  10        CC TKES Blue, 3 sec, x 30 NP NP NP NP        Lateral tap downs 6", 2 x 10, 4#, x 10 NP 6" 3 x 10 NP 6" 3 x 10        Pball squats < 90 degrees 5sec, 3 x 10 5 sec x 30 5esc x 30 NP 5 sec x 30        Slow march with movement (balance) Ymb, 25' x 4 YMB 25' x 4 YNB 25' x 4 NP YMB 25' x 4        Tandem Walk (balance) YMB, 25' x 4 YMB 25' x 4 YMB 25' x 4 NP YMB 25' x 4        Side step (band around waist)   Black 25' x 5 NP Black 25' x 5                                                                                                       Modalities    3/1          MH 10 min 10 min 10 min  10 min        Cryo 10 min 10 min 10 min  10 min

## 2018-03-12 ENCOUNTER — OFFICE VISIT (OUTPATIENT)
Dept: PHYSICAL THERAPY | Facility: CLINIC | Age: 58
End: 2018-03-12
Payer: COMMERCIAL

## 2018-03-12 ENCOUNTER — TRANSCRIBE ORDERS (OUTPATIENT)
Dept: PHYSICAL THERAPY | Facility: CLINIC | Age: 58
End: 2018-03-12

## 2018-03-12 DIAGNOSIS — Z96.651 PRESENCE OF RIGHT ARTIFICIAL KNEE JOINT: Primary | ICD-10-CM

## 2018-03-12 DIAGNOSIS — M17.11 PRIMARY OSTEOARTHRITIS OF RIGHT KNEE: ICD-10-CM

## 2018-03-12 DIAGNOSIS — M17.11 OSTEOARTHRITIS OF RIGHT KNEE, UNSPECIFIED OSTEOARTHRITIS TYPE: Primary | ICD-10-CM

## 2018-03-12 PROCEDURE — 97110 THERAPEUTIC EXERCISES: CPT | Performed by: PHYSICAL THERAPIST

## 2018-03-12 PROCEDURE — G8978 MOBILITY CURRENT STATUS: HCPCS | Performed by: PHYSICAL THERAPIST

## 2018-03-12 PROCEDURE — G8979 MOBILITY GOAL STATUS: HCPCS | Performed by: PHYSICAL THERAPIST

## 2018-03-12 PROCEDURE — 97140 MANUAL THERAPY 1/> REGIONS: CPT | Performed by: PHYSICAL THERAPIST

## 2018-03-12 NOTE — PROGRESS NOTES
Daily Note     Today's date: 2018  Patient name: Jennifer Puckett  : 1960  MRN: [id number]  Referring provider: Milton Valle MD  Dx:   T12 26  [phone]  S/p (R) TKA 17          Subjective: Mild stiffness, no pain > 1/10 since last visit  He is pleased with recent progress  Objective: See treatment diary below  Active Range of Motion:  Right knee  Flexion: 120 degrees  Extension: lacks 0  degree    Passive Range of Motion     Right Knee   Flexion: 128 degrees   Extension: 0 degrees    Has minimal R knee edema  Good R patellar mobility    Has 5/5 R knee flexion and extension strength  Has 5/5 R hip strength except for Abd: 4+/5  Has 5/5 R ankle strength    Gait normal on level ground  Reciprocal gait on stairs with rail      Assessment:   Tolerated session well  Full active extension today  Over the past month pt has made the following progress towards goals:  1) Decreased pain  2) Improved range of motion  3) Improved strength  4) Improved self rated functional score  Plan: Doing well, hold on therapy at this time  Pt will continue with home program which I reviewed with him today  Advised him to call me should questions or concernes regarding exercise arise, or if he feels knee extension is regressing  If I do not hear from him within a month I will discharge his chart  Daily Treatment Diary     Manual  /2 2/6 2/7 2/9 2/13 2/15 2/19   PROM R knee flexion/extension, patellar mobilizations RG RG RG RG Rn 2 weeks      Precautions: PMH of DVT and PE    G RG RG NP (time) RG Rg, extension and patellar mobilizations   IASTM R distal ITB      NP NP      Manual R groin stretch      RG RG                    (15 min) (10 min) (15 min) (10 min) (8 min) (10- min) (10 min)  (15 min) (10 min)       Exercise Diary              Bike 10 min 10 min 10min 10 min 10 min 10 min 10 min 10 min 10 min 10 min   Long Sit HS/Gastroc stretch 30 sec x 3 30 sec x 3 30 sec x 3 30 sec x 3 30 sec x 3 30 sec x 3 30 sec x 3 30 sec x 3 30 sec x 3 30 sec x 3   SLR Flexion  1 5# 3 x 10 1 5#, 3 x 10 1 5#, 3 x 10 1 5#, 3 x 10 2#, 3, x 10 2#, 2 x 10 2#, 3 x 10 NP (time) 2#, 2 x 10 2# 2 x 10   Heel slides with strap 10sec 2 x 10 10 sec, 2 x 10 10 sec, 2 x 10 10sec 2 x 10 10 sec, 2 x 10 10 sec, 2 x 10 10 sec, 2 x 10 10 sec, 2 x 10 10 sec x 10 10 sec x 10   Strap Itband stretch 30sec x 3 NP NP NP NP    30 sec x 3 30 sec x 3   Step ups NV 8"  2 x 10 NP NP NP 8" 2 x 10- NP  NP    Standing Hip ABD 2#2 x 10 NV 2#, 2 x 10 2#, 2 x 1- 2 5#, 2 x 10 2 5#, 2 x 10 2 5#, 2 x 10 3#, 2 x 15 3#, 2 x 15 NP   Pball squats 3 sec 2 x 10 3 sec, 2 x 10 3 sec, 2 x 10 3 sec, 2 x 10 5sec, 2 x 10 3 sec, 2 x 10 3 sec, 2 x 10 3 sec, 3 x 10 3 sec x 10 3 sec  3 x 10   Light load, long duration stretch for extension with heel prop 10#, 3 min 10#, 3 min 10# 3 min 10#, 3 min 10#, 3 min 10#, 3 min 10#, 4 min 10#, 4 min NP 10#, 4 min   Self seated AA flexion 2 x 10 X 10 10 sec x 10 10 sec x 10 10 sec x 10 NP NP  NP    LAQ   3#, 2 x 10 4 5#, 2 x 10 5#, 2 x 10 5#, 2 x 10 5# 2 x 10 5#, 2 x 10 5#, 3, sec, 3 x 10 5#, 3 x 10   CC Tkes   Blue, 5 sec x 30 Blue, 5 sec, x 30 Blue, 5 sec, x 30 Blue, 5 sec, x 30 Blue, 5 sec x 30 NP (time) Blue, 5 sec x 30 Blue, 5 sec x 30   Lateral tap downs     4", 2 x 10 4", 2 x 10 4"   2 x 10 4", 2 x 10 4", 3 x 10 6", 3 x 10   Tband HS curls      Green, 2 x 10 Green, 2 x 10 Blue, 2 x 10 Blue, 2 x 15 Blue, 2 x 15   Marching with movement (balance)         25' x 4 25' x 4 ymb   Tandem walk (balance)         25' x 4 25' x 4, ymb                                                           Modalities              MH R knee 10 min 10 min 10 min 10 min 10 min   10 min 10 min 5 min 10 min 10 min   Cryo R knee 10 min 10 min 10 min 10 min 10 min 10 min 10 min NP 10 min 10 min                          Precautions: PMH DVT and PE    Daily Treatment Diary     Manual  2/21 2/27 3/1 3/5 3/9 3/12       PROM R knee extension, patellar mobilizations RG RG RG RG RG RG                                               (10 min) (10 min) 5 min 5 min 5 min 5 min           Exercise Diary  2/21 2/27 3/1 3/5 3/9 3/12       Bike 10 min 10 min 10 min 10 min 10 min 10 min       Long sit Hs/gastroc stretch 30 sec x 3 20 sec x 3 30 sec x 3 30 sec x 3 30 sec x 3 30 sec x3       Light load, long duration knee extension stretch 10#, 4 min 10# x 4 min 10# x 4 min 10#, 4 min 10# x 4 min 10# x 4       SLR Flexion 2#, x 22 (groin pain) 2#, 2 x 10 2#, 2 x 10 NP 3#, 2 x 10 3#, 2 x 10       LAQ 5#, x 30 6#, x 30 6#, x 30 NP 6#, x 30 6#, x 30       Tband HS curls Blue, 2 x 15 Blue, 2 x 15 Blue, 2 x 15 Blue,2  x15 Black, 2 x 15 Black, 2 x 15       Standing hip abduction (bilat) 3#, 2 x 15 3#, 2 x 15 3#, 2 x 15 NP 3# , 2  10 3#, 2 x 10       CC TKES Blue, 3 sec, x 30 NP NP NP NP        Lateral tap downs 6", 2 x 10, 4#, x 10 NP 6" 3 x 10 NP 6" 3 x 10 6", 3 x 10       Pball squats < 90 degrees 5sec, 3 x 10 5 sec x 30 5esc x 30 NP 5 sec x 30 5sec x 30       Slow march with movement (balance) Ymb, 25' x 4 YMB 25' x 4 YNB 25' x 4 NP YMB 25' x 4 YMB, 25' x 4       Tandem Walk (balance) YMB, 25' x 4 YMB 25' x 4 YMB 25' x 4 NP YMB 25' x 4 YMB 25' x 4       Side step (band around waist)   Black 25' x 5 NP Black 25' x 5 Black 25' x 5                                                                                                      Modalities    3/1          MH 10 min 10 min 10 min  10 min 10 min       Cryo 10 min 10 min 10 min  10 min 10 min                        Addendum 3/30/18: Called and spoke with patient on 3/19/18  He is doing well, continuing with HEP  Now discharged from my care

## 2018-03-12 NOTE — LETTER
[id number]    MD [address]el[address] [place] [address]    Patient: Zulma Arechiga   YOB: 1960   Date of Visit: 3/12/2018     Encounter Diagnosis     ICD-10-CM    1  Presence of right artificial knee joint Z96 651    2  Primary osteoarthritis of right knee M17 11        Dear Dr Shamar Faulkner:    Please review the attached Plan of Care from [facility] recent visit  Please verify that you agree therapy should continue by signing the attached document and sending it back to our office  If you have any questions or concerns, please don't hesitate to call  Sincerely,    Kelli Delgado PT      Referring Provider:      I certify that I have read the below Plan of Care and certify the need for these services furnished under this plan of treatment while under my care  MD Adelso [address] [facility]: [phone]          Daily Note     Today's date: 2018  Patient name: Zulma Arechiga  : 1960  MRN: [id number]  Referring provider: Tj Parkinson MD  Dx:   H20 74  P59 133  S/p (R) TKA 17          Subjective: Mild stiffness, no pain > 1/10 since last visit  He is pleased with recent progress  Objective: See treatment diary below  Active Range of Motion:  Right knee  Flexion: 120 degrees  Extension: lacks 0  degree    Passive Range of Motion     Right Knee   Flexion: 128 degrees   Extension: 0 degrees    Has minimal R knee edema  Good R patellar mobility    Has 5/5 R knee flexion and extension strength  Has 5/5 R hip strength except for Abd: 4+/5  Has 5/5 R ankle strength    Gait normal on level ground  Reciprocal gait on stairs with rail      Assessment:   Tolerated session well  Full active extension today      Over the past month pt has made the following progress towards goals:  1) Decreased pain  2) Improved range of motion  3) Improved strength  4) Improved self rated functional score  Plan: Doing well, hold on therapy at this time  Pt will continue with home program which I reviewed with him today  Advised him to call me should questions or concernes regarding exercise arise, or if he feels knee extension is regressing  If I do not hear from him within a month I will discharge his chart  Daily Treatment Diary     Manual  1/26 1/29 2/1 2/2 2/6 2/7 2/9 2/13 2/15 2/19   PROM R knee flexion/extension, patellar mobilizations RG RG RG RG Rn 2 weeks  Precautions: PMH of DVT and PE    G RG RG NP (time) RG Rg, extension and patellar mobilizations   IASTM R distal ITB      NP NP      Manual R groin stretch      RG RG                    (15 min) (10 min) (15 min) (10 min) (8 min) (10- min) (10 min)  (15 min) (10 min)       Exercise Diary              Bike 10 min 10 min 10min 10 min 10 min 10 min 10 min 10 min 10 min 10 min   Long Sit HS/Gastroc stretch 30 sec x 3 30 sec x 3 30 sec x 3 30 sec x 3 30 sec x 3 30 sec x 3 30 sec x 3 30 sec x 3 30 sec x 3 30 sec x 3   SLR Flexion  1 5# 3 x 10 1 5#, 3 x 10 1 5#, 3 x 10 1 5#, 3 x 10 2#, 3, x 10 2#, 2 x 10 2#, 3 x 10 NP (time) 2#, 2 x 10 2# 2 x 10   Heel slides with strap 10sec 2 x 10 10 sec, 2 x 10 10 sec, 2 x 10 10sec 2 x 10 10 sec, 2 x 10 10 sec, 2 x 10 10 sec, 2 x 10 10 sec, 2 x 10 10 sec x 10 10 sec x 10   Strap Itband stretch 30sec x 3 NP NP NP NP    30 sec x 3 30 sec x 3   Step ups NV 8"  2 x 10 NP NP NP 8" 2 x 10- NP  NP    Standing Hip ABD 2#2 x 10 NV 2#, 2 x 10 2#, 2 x 1- 2 5#, 2 x 10 2 5#, 2 x 10 2 5#, 2 x 10 3#, 2 x 15 3#, 2 x 15 NP   Pball squats 3 sec 2 x 10 3 sec, 2 x 10 3 sec, 2 x 10 3 sec, 2 x 10 5sec, 2 x 10 3 sec, 2 x 10 3 sec, 2 x 10 3 sec, 3 x 10 3 sec x 10 3 sec   3 x 10   Light load, long duration stretch for extension with heel prop 10#, 3 min 10#, 3 min 10# 3 min 10#, 3 min 10#, 3 min 10#, 3 min 10#, 4 min 10#, 4 min NP 10#, 4 min   Self seated AA flexion 2 x 10 X 10 10 sec x 10 10 sec x 10 10 sec x 10 NP NP  NP LAQ   3#, 2 x 10 4 5#, 2 x 10 5#, 2 x 10 5#, 2 x 10 5# 2 x 10 5#, 2 x 10 5#, 3, sec, 3 x 10 5#, 3 x 10   CC Tkes   Blue, 5 sec x 30 Blue, 5 sec, x 30 Blue, 5 sec, x 30 Blue, 5 sec, x 30 Blue, 5 sec x 30 NP (time) Blue, 5 sec x 30 Blue, 5 sec x 30   Lateral tap downs     4", 2 x 10 4", 2 x 10 4"   2 x 10 4", 2 x 10 4", 3 x 10 6", 3 x 10   Tband HS curls      Green, 2 x 10 Green, 2 x 10 Blue, 2 x 10 Blue, 2 x 15 Blue, 2 x 15   Marching with movement (balance)         25' x 4 25' x 4 ymb   Tandem walk (balance)         25' x 4 25' x 4, ymb                                                           Modalities              MH R knee 10 min 10 min 10 min 10 min 10 min   10 min 10 min 5 min 10 min 10 min   Cryo R knee 10 min 10 min 10 min 10 min 10 min 10 min 10 min NP 10 min 10 min                          Precautions: PMH DVT and PE    Daily Treatment Diary     Manual  2/21 2/27 3/1 3/5 3/9 3/12       PROM R knee extension, patellar mobilizations RG RG RG RG RG RG                                               (10 min) (10 min) 5 min 5 min 5 min 5 min           Exercise Diary  2/21 2/27 3/1 3/5 3/9 3/12       Bike 10 min 10 min 10 min 10 min 10 min 10 min       Long sit Hs/gastroc stretch 30 sec x 3 20 sec x 3 30 sec x 3 30 sec x 3 30 sec x 3 30 sec x3       Light load, long duration knee extension stretch 10#, 4 min 10# x 4 min 10# x 4 min 10#, 4 min 10# x 4 min 10# x 4       SLR Flexion 2#, x 22 (groin pain) 2#, 2 x 10 2#, 2 x 10 NP 3#, 2 x 10 3#, 2 x 10       LAQ 5#, x 30 6#, x 30 6#, x 30 NP 6#, x 30 6#, x 30       Tband HS curls Blue, 2 x 15 Blue, 2 x 15 Blue, 2 x 15 Blue,2  x15 Black, 2 x 15 Black, 2 x 15       Standing hip abduction (bilat) 3#, 2 x 15 3#, 2 x 15 3#, 2 x 15 NP 3# , 2  10 3#, 2 x 10       CC TKES Blue, 3 sec, x 30 NP NP NP NP        Lateral tap downs 6", 2 x 10, 4#, x 10 NP 6" 3 x 10 NP 6" 3 x 10 6", 3 x 10       Pball squats < 90 degrees 5sec, 3 x 10 5 sec x 30 5esc x 30 NP 5 sec x 30 5sec x 30 Slow march with movement (balance) Ymb, 25' x 4 YMB 25' x 4 YNB 25' x 4 NP YMB 25' x 4 YMB, 25' x 4       Tandem Walk (balance) YMB, 25' x 4 YMB 25' x 4 YMB 25' x 4 NP YMB 25' x 4 YMB 25' x 4       Side step (band around waist)   Black 25' x 5 NP Black 25' x 5 Black 25' x 5                                                                                                      Modalities    3/1          MH 10 min 10 min 10 min  10 min 10 min       Cryo 10 min 10 min 10 min  10 min 10 min                        Addendum 3/30/18: Called and spoke with patient on 3/19/18  He is doing well, continuing with HEP  Now discharged from my care

## 2019-05-23 NOTE — PRE-PROCEDURE INSTRUCTIONS
Nikki from CT calls writer and reports pt is appearing more anxious after the contrast in CT. Will assess when pt returns   Pre-Surgery Instructions:   Medication Instructions    Fexofenadine HCl (ALLEGRA PO) Instructed patient per Anesthesia Guidelines   Naproxen Sodium (ALEVE PO) Patient was instructed by Physician and understands  No meds needed am of surgery  per anesthesia DR Main Lala

## 2019-09-13 ENCOUNTER — OFFICE VISIT (OUTPATIENT)
Dept: FAMILY MEDICINE CLINIC | Facility: CLINIC | Age: 59
End: 2019-09-13
Payer: COMMERCIAL

## 2019-09-13 VITALS
HEIGHT: 72 IN | DIASTOLIC BLOOD PRESSURE: 78 MMHG | OXYGEN SATURATION: 96 % | SYSTOLIC BLOOD PRESSURE: 118 MMHG | WEIGHT: 236.4 LBS | TEMPERATURE: 97.7 F | RESPIRATION RATE: 16 BRPM | BODY MASS INDEX: 32.02 KG/M2 | HEART RATE: 69 BPM

## 2019-09-13 DIAGNOSIS — J20.9 ACUTE BRONCHITIS, UNSPECIFIED ORGANISM: Primary | ICD-10-CM

## 2019-09-13 PROCEDURE — 99214 OFFICE O/P EST MOD 30 MIN: CPT | Performed by: FAMILY MEDICINE

## 2019-09-13 RX ORDER — LEVOFLOXACIN 500 MG/1
500 TABLET, FILM COATED ORAL DAILY
Qty: 7 TABLET | Refills: 0 | Status: SHIPPED | OUTPATIENT
Start: 2019-09-13 | End: 2019-09-20

## 2019-09-13 NOTE — PROGRESS NOTES
Assessment/Plan:   1  Acute bronchitis, unspecified organism  Start supportive care  Maintain hydration  Take over-the-counter Mucinex  He may use fluticasone nasal spray  Start treatment with Levaquin 500 mg daily for 7 days  - levofloxacin (LEVAQUIN) 500 mg tablet; Take 1 tablet (500 mg total) by mouth daily for 7 days  Dispense: 7 tablet; Refill: 0     There are no diagnoses linked to this encounter  Subjective:    Chief Complaint   Patient presents with    Sore Throat     x 1 week, took nyquil and mucinex    Cough    Nasal Congestion        Patient ID: Manish Estrada is a 61 y o  male  Cough   This is a new problem  The current episode started in the past 7 days  The problem has been unchanged  The problem occurs constantly  The cough is productive of sputum  Associated symptoms include ear congestion, ear pain, nasal congestion and a sore throat  Pertinent negatives include no chest pain, chills, eye redness, fever, headaches, myalgias, postnasal drip, shortness of breath or wheezing  Treatments tried: Nyquil and Dayquil  The treatment provided mild relief  There is no history of environmental allergies  Review of Systems   Constitutional: Negative for activity change, chills, fatigue and fever  HENT: Positive for ear pain and sore throat  Negative for congestion, postnasal drip and sinus pressure  Eyes: Negative for redness, itching and visual disturbance  Respiratory: Positive for cough  Negative for shortness of breath and wheezing  Cardiovascular: Negative for chest pain and palpitations  Gastrointestinal: Negative for abdominal pain, diarrhea and nausea  Endocrine: Negative for cold intolerance and heat intolerance  Genitourinary: Negative for dysuria, flank pain and frequency  Musculoskeletal: Negative for arthralgias, back pain, gait problem and myalgias  Skin: Negative for color change  Allergic/Immunologic: Negative for environmental allergies  Neurological: Negative for dizziness, numbness and headaches  Psychiatric/Behavioral: Negative for behavioral problems and sleep disturbance  The following portions of the patient's history were reviewed and updated as appropriate : past family history, past medical history, past social history and past surgical history  Current Outpatient Medications:     DUEXIS 800-26 6 MG TABS, , Disp: , Rfl:     fexofenadine (ALLEGRA) 60 MG tablet, Take by mouth, Disp: , Rfl:     sertraline (ZOLOFT) 50 mg tablet, Take 1 tablet (50 mg total) by mouth daily, Disp: 90 tablet, Rfl: 1    Objective:    Vitals:    09/13/19 0932   BP: 118/78   BP Location: Left arm   Patient Position: Sitting   Pulse: 69   Resp: 16   Temp: 97 7 °F (36 5 °C)   TempSrc: Tympanic   SpO2: 96%   Weight: 107 kg (236 lb 6 4 oz)   Height: 5' 11 5" (1 816 m)        Physical Exam   Constitutional: He is oriented to person, place, and time  He appears well-developed and well-nourished  HENT:   Head: Normocephalic and atraumatic  Nose: Nose normal    Mouth/Throat: No oropharyngeal exudate  Eyes: Pupils are equal, round, and reactive to light  Right eye exhibits no discharge  Left eye exhibits no discharge  Neck: Normal range of motion  Neck supple  No tracheal deviation present  Cardiovascular: Normal rate, regular rhythm and intact distal pulses  Exam reveals no gallop and no friction rub  No murmur heard  Pulses:       Dorsalis pedis pulses are 2+ on the right side, and 2+ on the left side  Posterior tibial pulses are 2+ on the right side, and 2+ on the left side  Pulmonary/Chest: Effort normal and breath sounds normal  No respiratory distress  He has no wheezes  He has no rales  Abdominal: Soft  Bowel sounds are normal  He exhibits no distension  There is no tenderness  There is no rebound and no guarding  Musculoskeletal: Normal range of motion  He exhibits no edema  Lymphadenopathy:        Head (right side):  No submental and no submandibular adenopathy present  Head (left side): No submental and no submandibular adenopathy present  He has no cervical adenopathy  Right cervical: No superficial cervical, no deep cervical and no posterior cervical adenopathy present  Left cervical: No superficial cervical, no deep cervical and no posterior cervical adenopathy present  Neurological: He is alert and oriented to person, place, and time  No cranial nerve deficit or sensory deficit  Skin: Skin is warm, dry and intact  Psychiatric: His speech is normal and behavior is normal  Judgment normal  His mood appears not anxious  Cognition and memory are normal  He does not exhibit a depressed mood  Vitals reviewed

## 2019-10-30 ENCOUNTER — OFFICE VISIT (OUTPATIENT)
Dept: FAMILY MEDICINE CLINIC | Facility: CLINIC | Age: 59
End: 2019-10-30
Payer: COMMERCIAL

## 2019-10-30 VITALS
HEIGHT: 71 IN | SYSTOLIC BLOOD PRESSURE: 120 MMHG | WEIGHT: 240 LBS | TEMPERATURE: 96.5 F | HEART RATE: 67 BPM | OXYGEN SATURATION: 98 % | BODY MASS INDEX: 33.6 KG/M2 | DIASTOLIC BLOOD PRESSURE: 80 MMHG

## 2019-10-30 DIAGNOSIS — R07.89 ATYPICAL CHEST PAIN: ICD-10-CM

## 2019-10-30 DIAGNOSIS — Z12.5 SCREENING FOR PROSTATE CANCER: ICD-10-CM

## 2019-10-30 DIAGNOSIS — Z11.59 ENCOUNTER FOR HEPATITIS C SCREENING TEST FOR LOW RISK PATIENT: ICD-10-CM

## 2019-10-30 DIAGNOSIS — Z11.59 NEED FOR HEPATITIS C SCREENING TEST: Primary | ICD-10-CM

## 2019-10-30 DIAGNOSIS — Z13.6 SCREENING FOR CARDIOVASCULAR CONDITION: ICD-10-CM

## 2019-10-30 DIAGNOSIS — Z13.0 SCREENING FOR DEFICIENCY ANEMIA: ICD-10-CM

## 2019-10-30 DIAGNOSIS — Z13.1 SCREENING FOR DIABETES MELLITUS: ICD-10-CM

## 2019-10-30 PROCEDURE — 99214 OFFICE O/P EST MOD 30 MIN: CPT | Performed by: FAMILY MEDICINE

## 2019-10-30 PROCEDURE — 3008F BODY MASS INDEX DOCD: CPT | Performed by: FAMILY MEDICINE

## 2019-10-30 NOTE — PROGRESS NOTES
[facility]      NAME: Ginette Pham  AGE: 61 y o  SEX: male  : 1960   MRN: [id number]    DATE: 10/30/2019  TIME: 1:26 PM    Assessment and Plan     Problem List Items Addressed This Visit     None      Visit Diagnoses     Need for hepatitis C screening test    -  Primary    Relevant Orders    Hepatitis C antibody    Encounter for hepatitis C screening test for low risk patient        Screening for cardiovascular condition        Relevant Orders    Lipid Panel with Direct LDL reflex    Screening for deficiency anemia        Relevant Orders    CBC and differential    Screening for diabetes mellitus        Relevant Orders    Comprehensive metabolic panel    Screening for prostate cancer        Relevant Orders    PSA, Total Screen    Atypical chest pain        Relevant Orders    CBC and differential    TSH, 3rd generation with Free T4 reflex    Stress test only, exercise         chest pain and other symptoms are somewhat atypical for coronary artery disease but will check risk factors and exercise stress test   Advised patient also try H2 blocker such as Pepcid at bedtime to see if we can prevent some of his GI symptoms  I have spent  30 minutes with Patient  today in which greater than 50% of this time was spent in counseling/coordination of care regarding Risks and benefits of tx options, Intructions for management and Patient and family education  Return to office in:   P r n  Chief Complaint     Chief Complaint   Patient presents with    Chest Pain     chest pressure x 5 days    Abdominal Pain     epigastric pain left side x 5 days       History of Present Illness     Patient complains of recent issues with some atypical chest discomfort which she describes as jolts or electric shock type symptoms which lasts seconds  He has also had some episodes of chest heaviness and pressure and left upper quadrant abdominal pressure and discomfort which is relieved by belching  Symptoms occur almost exclusively at night after his gone to bed  He does relate that he has been under an unusual amount of stress lately with several acquaintances dieting and another 1 undergoing brain surgery  He does have a family history of coronary artery disease  He has not had any medical interventions or evaluations in several years  The following portions of the patient's history were reviewed and updated as appropriate: allergies, current medications, past family history, past medical history, past social history, past surgical history and problem list     Review of Systems   Review of Systems   Constitutional: Negative  Respiratory: Positive for chest tightness  Cardiovascular: Negative  Gastrointestinal: Positive for abdominal pain  Genitourinary: Negative  Musculoskeletal: Negative  Psychiatric/Behavioral: Negative  Active Problem List     Patient Active Problem List   Diagnosis    Osteoarthritis of both knees    Arthritis of right knee    Depression    GERD without esophagitis    Squamous cell carcinomatosis (HCC)    Bilateral hearing loss    Disc degeneration, lumbar       Objective   /80 (BP Location: Right arm, Patient Position: Sitting, Cuff Size: Adult)   Pulse 67   Temp (!) 96 5 °F (35 8 °C)   Ht 5' 10 75" (1 797 m)   Wt 109 kg (240 lb)   SpO2 98%   BMI 33 71 kg/m²     Physical Exam   Constitutional: He is oriented to person, place, and time  He appears well-developed and well-nourished  No distress  HENT:   Head: Normocephalic and atraumatic  Eyes: Pupils are equal, round, and reactive to light  Conjunctivae are normal  Right eye exhibits no discharge  Neck: Normal range of motion  No thyromegaly present  Cardiovascular: Normal rate and regular rhythm  Pulmonary/Chest: Effort normal and breath sounds normal  No respiratory distress  Lymphadenopathy:     He has no cervical adenopathy     Neurological: He is alert and oriented to person, place, and time  Skin: Skin is warm and dry  He is not diaphoretic  Psychiatric: He has a normal mood and affect  His behavior is normal  Judgment and thought content normal    Nursing note and vitals reviewed          Current Medications     Current Outpatient Medications:     fexofenadine (ALLEGRA) 60 MG tablet, Take by mouth, Disp: , Rfl:     Health Maintenance     Health Maintenance   Topic Date Due    Hepatitis C Screening  1960    Pneumococcal Vaccine: Pediatrics (0 to 5 Years) and At-Risk Patients (6 to 59 Years) (1 of 3 - PCV13) 07/24/1966    BMI: Followup Plan  07/24/1978    DTaP,Tdap,and Td Vaccines (2 - Td) 02/15/2018    BMI: Adult  10/30/2020    CRC Screening: Colonoscopy  05/11/2021    Pneumococcal Vaccine: 65+ Years (1 of 2 - PCV13) 07/24/2025    INFLUENZA VACCINE  Completed    HEPATITIS B VACCINES  Aged Out     Immunization History   Administered Date(s) Administered    Hep B, adult 06/03/1994, 07/08/1994, 12/09/1994    INFLUENZA 10/24/2007, 10/15/2008, 10/21/2009, 10/20/2010, 02/05/2016, 01/19/2018    Influenza Injectable, MDCK, Preservative Free, Quadrivalent, 0 5 mL 10/25/2019    Influenza Quadrivalent Preservative Free 3 years and older IM 01/19/2018    Influenza Quadrivalent, 6-35 Months IM 02/05/2016, 01/19/2018    Tdap 02/15/2008       Nicola Najjar, DO  [facility]

## 2019-10-31 ENCOUNTER — APPOINTMENT (OUTPATIENT)
Dept: LAB | Facility: CLINIC | Age: 59
End: 2019-10-31
Payer: COMMERCIAL

## 2019-10-31 DIAGNOSIS — R07.89 ATYPICAL CHEST PAIN: ICD-10-CM

## 2019-10-31 DIAGNOSIS — Z12.5 SCREENING FOR PROSTATE CANCER: ICD-10-CM

## 2019-10-31 DIAGNOSIS — Z13.0 SCREENING FOR DEFICIENCY ANEMIA: ICD-10-CM

## 2019-10-31 DIAGNOSIS — Z13.6 SCREENING FOR CARDIOVASCULAR CONDITION: ICD-10-CM

## 2019-10-31 DIAGNOSIS — Z11.59 NEED FOR HEPATITIS C SCREENING TEST: ICD-10-CM

## 2019-10-31 DIAGNOSIS — Z13.1 SCREENING FOR DIABETES MELLITUS: ICD-10-CM

## 2019-10-31 LAB
ALBUMIN SERPL BCP-MCNC: 4.3 G/DL (ref 3.5–5)
ALP SERPL-CCNC: 77 U/L (ref 46–116)
ALT SERPL W P-5'-P-CCNC: 29 U/L (ref 12–78)
ANION GAP SERPL CALCULATED.3IONS-SCNC: 7 MMOL/L (ref 4–13)
AST SERPL W P-5'-P-CCNC: 17 U/L (ref 5–45)
BASOPHILS # BLD AUTO: 0.05 THOUSANDS/ΜL (ref 0–0.1)
BASOPHILS NFR BLD AUTO: 1 % (ref 0–1)
BILIRUB SERPL-MCNC: 0.77 MG/DL (ref 0.2–1)
BUN SERPL-MCNC: 21 MG/DL (ref 5–25)
CALCIUM SERPL-MCNC: 9.1 MG/DL (ref 8.3–10.1)
CHLORIDE SERPL-SCNC: 108 MMOL/L (ref 100–108)
CHOLEST SERPL-MCNC: 199 MG/DL (ref 50–200)
CO2 SERPL-SCNC: 27 MMOL/L (ref 21–32)
CREAT SERPL-MCNC: 1.04 MG/DL (ref 0.6–1.3)
EOSINOPHIL # BLD AUTO: 0.2 THOUSAND/ΜL (ref 0–0.61)
EOSINOPHIL NFR BLD AUTO: 3 % (ref 0–6)
ERYTHROCYTE [DISTWIDTH] IN BLOOD BY AUTOMATED COUNT: 13.7 % (ref 11.6–15.1)
GFR SERPL CREATININE-BSD FRML MDRD: 78 ML/MIN/1.73SQ M
GLUCOSE P FAST SERPL-MCNC: 85 MG/DL (ref 65–99)
HCT VFR BLD AUTO: 44.9 % (ref 36.5–49.3)
HCV AB SER QL: NORMAL
HDLC SERPL-MCNC: 56 MG/DL
HGB BLD-MCNC: 14.5 G/DL (ref 12–17)
IMM GRANULOCYTES # BLD AUTO: 0.03 THOUSAND/UL (ref 0–0.2)
IMM GRANULOCYTES NFR BLD AUTO: 0 % (ref 0–2)
LDLC SERPL CALC-MCNC: 135 MG/DL (ref 0–100)
LYMPHOCYTES # BLD AUTO: 1.48 THOUSANDS/ΜL (ref 0.6–4.47)
LYMPHOCYTES NFR BLD AUTO: 22 % (ref 14–44)
MCH RBC QN AUTO: 30.4 PG (ref 26.8–34.3)
MCHC RBC AUTO-ENTMCNC: 32.3 G/DL (ref 31.4–37.4)
MCV RBC AUTO: 94 FL (ref 82–98)
MONOCYTES # BLD AUTO: 0.54 THOUSAND/ΜL (ref 0.17–1.22)
MONOCYTES NFR BLD AUTO: 8 % (ref 4–12)
NEUTROPHILS # BLD AUTO: 4.42 THOUSANDS/ΜL (ref 1.85–7.62)
NEUTS SEG NFR BLD AUTO: 66 % (ref 43–75)
NRBC BLD AUTO-RTO: 0 /100 WBCS
PLATELET # BLD AUTO: 216 THOUSANDS/UL (ref 149–390)
PMV BLD AUTO: 11 FL (ref 8.9–12.7)
POTASSIUM SERPL-SCNC: 4.4 MMOL/L (ref 3.5–5.3)
PROT SERPL-MCNC: 6.9 G/DL (ref 6.4–8.2)
PSA SERPL-MCNC: 0.8 NG/ML (ref 0–4)
RBC # BLD AUTO: 4.77 MILLION/UL (ref 3.88–5.62)
SODIUM SERPL-SCNC: 142 MMOL/L (ref 136–145)
TRIGL SERPL-MCNC: 38 MG/DL
TSH SERPL DL<=0.05 MIU/L-ACNC: 2.1 UIU/ML (ref 0.36–3.74)
WBC # BLD AUTO: 6.72 THOUSAND/UL (ref 4.31–10.16)

## 2019-10-31 PROCEDURE — G0103 PSA SCREENING: HCPCS

## 2019-10-31 PROCEDURE — 84443 ASSAY THYROID STIM HORMONE: CPT

## 2019-10-31 PROCEDURE — 85025 COMPLETE CBC W/AUTO DIFF WBC: CPT

## 2019-10-31 PROCEDURE — 80061 LIPID PANEL: CPT

## 2019-10-31 PROCEDURE — 36415 COLL VENOUS BLD VENIPUNCTURE: CPT

## 2019-10-31 PROCEDURE — 86803 HEPATITIS C AB TEST: CPT

## 2019-10-31 PROCEDURE — 80053 COMPREHEN METABOLIC PANEL: CPT

## 2019-11-11 ENCOUNTER — HOSPITAL ENCOUNTER (OUTPATIENT)
Dept: NON INVASIVE DIAGNOSTICS | Facility: HOSPITAL | Age: 59
Discharge: HOME | End: 2019-11-11
Payer: COMMERCIAL

## 2019-11-11 DIAGNOSIS — R07.89 ATYPICAL CHEST PAIN: ICD-10-CM

## 2019-11-11 LAB
ARRHY DURING EX: NORMAL
CHEST PAIN STATEMENT: NORMAL
MAX DIASTOLIC BP: 84 MMHG
MAX HEART RATE: 151 BPM
MAX PREDICTED HEART RATE: 161 BPM
MAX. SYSTOLIC BP: 150 MMHG
PROTOCOL NAME: NORMAL
TARGET HR FORMULA: NORMAL
TEST INDICATION: NORMAL
TIME IN EXERCISE PHASE: NORMAL

## 2019-11-11 PROCEDURE — 93018 CV STRESS TEST I&R ONLY: CPT | Performed by: INTERNAL MEDICINE

## 2019-11-11 PROCEDURE — 93016 CV STRESS TEST SUPVJ ONLY: CPT | Performed by: INTERNAL MEDICINE

## 2019-11-11 PROCEDURE — 93017 CV STRESS TEST TRACING ONLY: CPT

## 2021-04-08 DIAGNOSIS — Z23 ENCOUNTER FOR IMMUNIZATION: ICD-10-CM

## 2021-07-29 ENCOUNTER — OFFICE VISIT (OUTPATIENT)
Dept: FAMILY MEDICINE CLINIC | Facility: CLINIC | Age: 61
End: 2021-07-29
Payer: COMMERCIAL

## 2021-07-29 VITALS
HEART RATE: 93 BPM | BODY MASS INDEX: 33.74 KG/M2 | WEIGHT: 241 LBS | DIASTOLIC BLOOD PRESSURE: 78 MMHG | TEMPERATURE: 98.1 F | SYSTOLIC BLOOD PRESSURE: 112 MMHG | HEIGHT: 71 IN | OXYGEN SATURATION: 99 %

## 2021-07-29 DIAGNOSIS — L08.9 FINGER INFECTION: Primary | ICD-10-CM

## 2021-07-29 DIAGNOSIS — Z23 NEED FOR SHINGLES VACCINE: ICD-10-CM

## 2021-07-29 PROCEDURE — 90471 IMMUNIZATION ADMIN: CPT | Performed by: FAMILY MEDICINE

## 2021-07-29 PROCEDURE — 90750 HZV VACC RECOMBINANT IM: CPT | Performed by: FAMILY MEDICINE

## 2021-07-29 PROCEDURE — 99214 OFFICE O/P EST MOD 30 MIN: CPT | Performed by: FAMILY MEDICINE

## 2021-07-29 PROCEDURE — 3725F SCREEN DEPRESSION PERFORMED: CPT | Performed by: FAMILY MEDICINE

## 2021-07-29 PROCEDURE — 3008F BODY MASS INDEX DOCD: CPT | Performed by: FAMILY MEDICINE

## 2021-07-29 RX ORDER — VALACYCLOVIR HYDROCHLORIDE 1 G/1
TABLET, FILM COATED ORAL
COMMUNITY
Start: 2021-05-15

## 2021-07-29 RX ORDER — CEPHALEXIN 500 MG/1
500 CAPSULE ORAL EVERY 12 HOURS SCHEDULED
Qty: 14 CAPSULE | Refills: 0 | Status: SHIPPED | OUTPATIENT
Start: 2021-07-29 | End: 2021-08-05

## 2021-07-29 NOTE — ASSESSMENT & PLAN NOTE
Infection of finger under nailbed after trauma  I do not recommend removal of nail as it may cause worsening infection and delayed healing  Patient advised to leave finger unwrapped as much as possible  Cover only when doing any physical activity that may cause traumatic contact with nail  Continue soaking finger in antiseptic wash as it is cleaning under nail bed  Patient instructed to start Keflex BID x 7 days  He was informed that the nail will grow back slowly on its own and he should not try to remove the nail  Continue to trim nail and keep clean  Monitor for signs of infection such as drainage, redness, swelling or fever  Follow up as needed

## 2021-07-29 NOTE — PROGRESS NOTES
Assessment/Plan:    1  Finger infection  Assessment & Plan:  Infection of finger under nailbed after trauma  I do not recommend removal of nail as it may cause worsening infection and delayed healing  Patient advised to leave finger unwrapped as much as possible  Cover only when doing any physical activity that may cause traumatic contact with nail  Continue soaking finger in antiseptic wash as it is cleaning under nail bed  Patient instructed to start Keflex BID x 7 days  He was informed that the nail will grow back slowly on its own and he should not try to remove the nail  Continue to trim nail and keep clean  Monitor for signs of infection such as drainage, redness, swelling or fever  Follow up as needed  Orders:  -     cephalexin (KEFLEX) 500 mg capsule; Take 1 capsule (500 mg total) by mouth every 12 (twelve) hours for 7 days    2  Need for shingles vaccine  -     Zoster Vaccine Recombinant IM  Counseled on importance of shingles vaccine and side effects  He verbalized understanding  Subjective:      Patient ID: Marilyn Espinoza is a 64 y o  male  HPI    Patient jammed his finger in the garage door 3 weeks ago  It started bleeding under the nail and he wrapped it and washed it  The patient has noticed that his nail has been loose  The last few days he noticed some discharge from under the nail and a foul odor  He started soaking it in warm water with antiseptic wash for the past couple of days and already the nail appears less infected  The odor has improved  Denies fever or redness  When he plays golf he wears a bandage but otherwise he keeps is uncovered        The following portions of the patient's history were reviewed and updated as appropriate: allergies, current medications, past family history, past medical history, past social history, past surgical history, and problem list       Current Outpatient Medications:     fexofenadine (ALLEGRA) 60 MG tablet, Take by mouth, Disp: , Rfl:     cephalexin (KEFLEX) 500 mg capsule, Take 1 capsule (500 mg total) by mouth every 12 (twelve) hours for 7 days, Disp: 14 capsule, Rfl: 0    valACYclovir (VALTREX) 1,000 mg tablet, TAKE 2 TABLETS AT ONSET THEN 2 TABLETS AFTER 12 HOURS, Disp: , Rfl:       Review of Systems   Constitutional: Negative for chills and fever  HENT: Negative for ear pain and sore throat  Eyes: Negative for pain and visual disturbance  Respiratory: Negative for cough and shortness of breath  Cardiovascular: Negative for chest pain and palpitations  Gastrointestinal: Negative for abdominal pain and vomiting  Genitourinary: Negative for dysuria and hematuria  Musculoskeletal: Negative for arthralgias and back pain  Skin: Positive for wound (right fingernail )  Negative for color change and rash  Neurological: Negative for seizures and syncope  All other systems reviewed and are negative  Objective:      /78 (BP Location: Right arm, Patient Position: Sitting, Cuff Size: Large)   Pulse 93   Temp 98 1 °F (36 7 °C) (Temporal)   Ht 5' 11" (1 803 m)   Wt 109 kg (241 lb)   SpO2 99%   BMI 33 61 kg/m²          Physical Exam  Vitals and nursing note reviewed  Constitutional:       General: He is not in acute distress  Appearance: Normal appearance  He is not ill-appearing  HENT:      Head: Normocephalic and atraumatic  Eyes:      Extraocular Movements: Extraocular movements intact  Conjunctiva/sclera: Conjunctivae normal    Cardiovascular:      Rate and Rhythm: Normal rate and regular rhythm  Heart sounds: Normal heart sounds  No murmur heard  Pulmonary:      Effort: Pulmonary effort is normal  No respiratory distress  Breath sounds: Normal breath sounds  No wheezing  Musculoskeletal:      Right hand: Deformity present        Left hand: Normal       Comments: Right ring fingernail slightly detached at distal end of finger  No active drainage or pus, mild tenderness around nailbed, dried brown discharge underneath nailbed   Skin intact, no swelling or erythema    Skin:     General: Skin is warm  Neurological:      General: No focal deficit present  Mental Status: He is alert and oriented to person, place, and time  Psychiatric:         Mood and Affect: Mood normal          Behavior: Behavior normal          Thought Content:  Thought content normal

## 2021-08-02 ENCOUNTER — OFFICE VISIT (OUTPATIENT)
Dept: URGENT CARE | Facility: MEDICAL CENTER | Age: 61
End: 2021-08-02
Payer: COMMERCIAL

## 2021-08-02 VITALS
WEIGHT: 235 LBS | HEART RATE: 64 BPM | TEMPERATURE: 99.6 F | HEIGHT: 71 IN | RESPIRATION RATE: 20 BRPM | DIASTOLIC BLOOD PRESSURE: 86 MMHG | BODY MASS INDEX: 32.9 KG/M2 | SYSTOLIC BLOOD PRESSURE: 130 MMHG | OXYGEN SATURATION: 99 %

## 2021-08-02 DIAGNOSIS — T78.40XA ALLERGIC REACTION, INITIAL ENCOUNTER: Primary | ICD-10-CM

## 2021-08-02 PROCEDURE — G0383 LEV 4 HOSP TYPE B ED VISIT: HCPCS | Performed by: PHYSICIAN ASSISTANT

## 2021-08-02 RX ORDER — FAMOTIDINE 40 MG/1
20 TABLET, FILM COATED ORAL 2 TIMES DAILY
Qty: 20 TABLET | Refills: 0 | Status: SHIPPED | OUTPATIENT
Start: 2021-08-02

## 2021-08-02 RX ORDER — PREDNISONE 10 MG/1
TABLET ORAL
Qty: 30 TABLET | Refills: 0 | Status: SHIPPED | OUTPATIENT
Start: 2021-08-02

## 2021-08-02 RX ORDER — METHYLPREDNISOLONE SODIUM SUCCINATE 125 MG/2ML
125 INJECTION, POWDER, LYOPHILIZED, FOR SOLUTION INTRAMUSCULAR; INTRAVENOUS ONCE
Status: COMPLETED | OUTPATIENT
Start: 2021-08-02 | End: 2021-08-02

## 2021-08-02 RX ORDER — DIPHENHYDRAMINE HCL 25 MG
25 TABLET ORAL EVERY 6 HOURS PRN
Qty: 30 TABLET | Refills: 0 | Status: SHIPPED | OUTPATIENT
Start: 2021-08-02

## 2021-08-02 RX ADMIN — Medication 50 MG: at 21:25

## 2021-08-02 RX ADMIN — METHYLPREDNISOLONE SODIUM SUCCINATE 125 MG: 125 INJECTION, POWDER, LYOPHILIZED, FOR SOLUTION INTRAMUSCULAR; INTRAVENOUS at 21:25

## 2021-08-03 NOTE — PROGRESS NOTES
[address] [facility]        NAME: Sherrill Morgan is a 64 y o  male  : 1960    MRN: [id number]  DATE: 2021  TIME: 9:46 PM    /86   Pulse 64   Temp 99 6 °F (37 6 °C)   Resp 20   Ht 5' 11" (1 803 m)   Wt 107 kg (235 lb)   SpO2 99%   BMI 32 78 kg/m²     Assessment and Plan   Allergic reaction, initial encounter [T78 40XA]  1  Allergic reaction, initial encounter  diphenhydrAMINE (BENADRYL) oral liquid 50 mg    methylPREDNISolone sodium succinate (Solu-MEDROL) injection 125 mg    predniSONE 10 mg tablet    famotidine (PEPCID) 40 MG tablet    diphenhydrAMINE (BENADRYL) 25 mg tablet         Patient Instructions       Follow up with PCP in 3-5 days  Proceed to  ER if symptoms worsen  Chief Complaint     Chief Complaint   Patient presents with    Allergic Reaction     Patietn started this AM with itching in hit neck, ears, and eys this am   This evening it progresed to swelling eyes, lips, tongue, and puffy in his cheeks         History of Present Illness       Pt with feeling of facial swelling and tongue and lip swelling since about 7pm  Pt with new antibiotic keflex       Review of Systems   Review of Systems   Constitutional: Negative  HENT: Negative  Eyes: Negative  Respiratory: Negative  Cardiovascular: Negative  Gastrointestinal: Negative  Endocrine: Negative  Genitourinary: Negative  Musculoskeletal: Negative  Skin: Negative  Allergic/Immunologic: Negative  Neurological: Negative  Hematological: Negative  Psychiatric/Behavioral: Negative  All other systems reviewed and are negative          Current Medications       Current Outpatient Medications:     cephalexin (KEFLEX) 500 mg capsule, Take 1 capsule (500 mg total) by mouth every 12 (twelve) hours for 7 days, Disp: 14 capsule, Rfl: 0    fexofenadine (ALLEGRA) 60 MG tablet, Take by mouth, Disp: , Rfl:     diphenhydrAMINE (BENADRYL) 25 mg tablet, Take 1 tablet (25 mg total) by mouth every 6 (six) hours as needed for itching, Disp: 30 tablet, Rfl: 0    famotidine (PEPCID) 40 MG tablet, Take 0 5 tablets (20 mg total) by mouth 2 (two) times a day, Disp: 20 tablet, Rfl: 0    predniSONE 10 mg tablet, 5 tabs po qd x 2 days then 4 tabs po qd x 2 days then 3 tabs po qd x 2 days then 2 tabs po qd x 2 days then 1 tab po qd x 2 days, Disp: 30 tablet, Rfl: 0    valACYclovir (VALTREX) 1,000 mg tablet, TAKE 2 TABLETS AT ONSET THEN 2 TABLETS AFTER 12 HOURS, Disp: , Rfl:     Current Facility-Administered Medications:     diphenhydrAMINE (BENADRYL) oral liquid 50 mg, 50 mg, Oral, Q6H PRN, Dwayne Morales PA-C, 50 mg at 08/02/21 2125    Current Allergies     Allergies as of 08/02/2021 - Reviewed 08/02/2021   Allergen Reaction Noted    Celecoxib  01/19/2018    Peanut oil - food allergy  10/30/2019    Nuts - food allergy Hives 02/16/2016    Soybean-containing drug products - food allergy Hives 02/16/2016    Wheat bran - food allergy Hives 02/16/2016            The following portions of the patient's history were reviewed and updated as appropriate: allergies, current medications, past family history, past medical history, past social history, past surgical history and problem list      Past Medical History:   Diagnosis Date    Arthritis     OA right knee    Cancer ([facility] [address] )     SCC cheek in past    Deep vein thrombosis (DVT) ([facility] [address] )     Hearing loss     hearing aide denis    History of DVT of lower extremity     RLE    Pulmonary embolism ([address] ) 1994    Wears glasses     reading       Past Surgical History:   Procedure Laterality Date    COLONOSCOPY      COLONOSCOPY N/A 5/11/2016    Procedure: COLONOSCOPY;  Surgeon: Brigid Ross MD;  Location: [facility];   Service:     CYST REMOVAL      back    KNEE ARTHROSCOPY Bilateral     KNEE SURGERY Right     SKIN CANCER EXCISION      face, MARU procedure    TOTAL KNEE ARTHROPLASTY Right 12/5/2017    Procedure: ARTHROPLASTY KNEE TOTAL;  Surgeon: Genevieve Ventura MD;  [facility];  Service: Orthopedics       Family History   Problem Relation Age of Onset    Alzheimer's disease Mother         other onset without behavioral disturbance    Heart failure Father         acute CHF    Leukemia Brother     Diabetes Paternal Grandmother     Cancer Paternal Grandfather          Medications have been verified  Objective   /86   Pulse 64   Temp 99 6 °F (37 6 °C)   Resp 20   Ht 5' 11" (1 803 m)   Wt 107 kg (235 lb)   SpO2 99%   BMI 32 78 kg/m²        Physical Exam     Physical Exam  Vitals and nursing note reviewed  Constitutional:       Appearance: Normal appearance  He is normal weight  HENT:      Head: Normocephalic and atraumatic  Right Ear: Tympanic membrane, ear canal and external ear normal       Left Ear: Tympanic membrane, ear canal and external ear normal       Nose: Nose normal       Mouth/Throat:      Mouth: Mucous membranes are moist       Pharynx: Oropharynx is clear  Eyes:      Extraocular Movements: Extraocular movements intact  Conjunctiva/sclera: Conjunctivae normal       Pupils: Pupils are equal, round, and reactive to light  Cardiovascular:      Rate and Rhythm: Normal rate and regular rhythm  Pulses: Normal pulses  Heart sounds: Normal heart sounds  Pulmonary:      Effort: Pulmonary effort is normal       Breath sounds: Normal breath sounds  Abdominal:      General: Abdomen is flat  Bowel sounds are normal    Musculoskeletal:         General: Normal range of motion  Cervical back: Normal range of motion and neck supple  Neurological:      General: No focal deficit present  Mental Status: He is alert and oriented to person, place, and time     Psychiatric:         Mood and Affect: Mood normal          Behavior: Behavior normal

## 2021-08-03 NOTE — PATIENT INSTRUCTIONS
General Allergic Reaction   WHAT YOU NEED TO KNOW:   An allergic reaction is your body's response to an allergen  Allergens include medicines, food, insect stings, animal dander, mold, latex, chemicals, and dust mites  Pollen from trees, grass, and weeds can also cause an allergic reaction  An allergic reaction can range from mild to severe  DISCHARGE INSTRUCTIONS:   Call 911 for signs or symptoms of anaphylaxis,  such as trouble breathing, swelling in your mouth or throat, or wheezing  You may also have itching, a rash, hives, or feel like you are going to faint  Return to the emergency department if:   · You have a skin rash, hives, swelling, or itching that is starting to get worse  · Your throat tightens, or your lips or tongue swell  · You have trouble swallowing or speaking  · You have worsening nausea, diarrhea, or abdominal cramps, or you are vomiting  · You have chest pain or tightness  Contact your healthcare provider if:   · You have questions or concerns about your condition or care  Medicines: You may need any of the following:  · Medicines  may be given to relieve certain allergy symptoms such as itching, sneezing, and swelling  You may take them as a pill or use drops in your nose or eyes  Topical treatments may be given to put directly on your skin to help decrease itching or swelling  · Epinephrine  may be prescribed if you are at risk for anaphylaxis  This is a severe allergic reaction that can be life-threatening  Your healthcare provider will tell you if you need to keep epinephrine with you  You will be taught when and how to use it  · Take your medicine as directed  Contact your healthcare provider if you think your medicine is not helping or if you have side effects  Tell him of her if you are allergic to any medicine  Keep a list of the medicines, vitamins, and herbs you take  Include the amounts, and when and why you take them   Bring the list or the pill bottles to follow-up visits  Carry your medicine list with you in case of an emergency  Follow up with your healthcare provider as directed:  Write down your questions so you remember to ask them during your visits  Manage your symptoms:   · Avoid allergens  You may need to have allergy testing with your healthcare provider or a specialist to find your allergens  · Use cold compresses on your skin or eyes  This will help soothe skin or eyes affected by the allergic reaction  You can make a cold compress by soaking a washcloth in cool water  Wring out the extra water before you apply the washcloth  · Rinse your nasal passages with a saline solution  Daily rinsing may help clear allergens out of your nose  Use distilled water if possible  You can also boil tap water and then let it cool before you use it  Do not use tap water without boiling it first     · Do not smoke  Nicotine and other chemicals in cigarettes and cigars can make an allergic reaction worse, and can also cause lung damage  Ask your healthcare provider for information if you currently smoke and need help to quit  E-cigarettes or smokeless tobacco still contain nicotine  Talk to your healthcare provider before you use these products  © Copyright [employer] 2021 Information is for End User's use only and may not be sold, redistributed or otherwise used for commercial purposes  All illustrations and images included in CareNotes® are the copyrighted property of [employer]  or Cayla Atkinson  The above information is an  only  It is not intended as medical advice for individual conditions or treatments  Talk to your doctor, nurse or pharmacist before following any medical regimen to see if it is safe and effective for you

## 2021-09-09 ENCOUNTER — OFFICE VISIT (OUTPATIENT)
Dept: FAMILY MEDICINE CLINIC | Facility: CLINIC | Age: 61
End: 2021-09-09
Payer: COMMERCIAL

## 2021-09-09 VITALS
OXYGEN SATURATION: 96 % | RESPIRATION RATE: 18 BRPM | HEIGHT: 71 IN | TEMPERATURE: 97.9 F | SYSTOLIC BLOOD PRESSURE: 118 MMHG | DIASTOLIC BLOOD PRESSURE: 74 MMHG | BODY MASS INDEX: 32.96 KG/M2 | WEIGHT: 235.4 LBS | HEART RATE: 85 BPM

## 2021-09-09 DIAGNOSIS — J01.90 ACUTE SINUSITIS, RECURRENCE NOT SPECIFIED, UNSPECIFIED LOCATION: Primary | ICD-10-CM

## 2021-09-09 PROCEDURE — 3008F BODY MASS INDEX DOCD: CPT | Performed by: FAMILY MEDICINE

## 2021-09-09 PROCEDURE — 99214 OFFICE O/P EST MOD 30 MIN: CPT | Performed by: FAMILY MEDICINE

## 2021-09-09 RX ORDER — AMOXICILLIN AND CLAVULANATE POTASSIUM 875; 125 MG/1; MG/1
1 TABLET, FILM COATED ORAL EVERY 12 HOURS SCHEDULED
Qty: 14 TABLET | Refills: 0 | Status: SHIPPED | OUTPATIENT
Start: 2021-09-09 | End: 2021-09-16

## 2021-09-09 RX ORDER — PROPRANOLOL/HYDROCHLOROTHIAZID 40 MG-25MG
TABLET ORAL
COMMUNITY

## 2021-09-09 NOTE — PROGRESS NOTES
Assessment/Plan:   1  Acute sinusitis, recurrence not specified, unspecified location    Reviewed patient's symptoms today  At this time, symptoms appear likely secondary to acute sinusitis  Start supportive care  Maintain hydration  Take over-the-counter Mucinex  Will start treatment with Augmentin b i d  for 7 days  Follow up if any symptoms persist   - amoxicillin-clavulanate (AUGMENTIN) 875-125 mg per tablet; Take 1 tablet by mouth every 12 (twelve) hours for 7 days  Dispense: 14 tablet; Refill: 0           There are no diagnoses linked to this encounter  Subjective:       Chief Complaint   Patient presents with    Follow-up     covid positive 8/4  congestion, PND       Patient ID: Vanessa Bethea is a 64 y o  male  Tested pos for covid on 8/25/21    Sinus Problem  This is a new problem  The current episode started in the past 7 days  The problem is unchanged  There has been no fever  The pain is mild  Associated symptoms include headaches and sinus pressure  Pertinent negatives include no chills, congestion, coughing, ear pain, shortness of breath or sore throat  Treatments tried: dayquil, zinc, vit D and C  Review of Systems   Constitutional: Negative for activity change, chills, fatigue and fever  HENT: Positive for sinus pressure  Negative for congestion, ear pain and sore throat  Eyes: Negative for redness, itching and visual disturbance  Respiratory: Negative for cough and shortness of breath  Cardiovascular: Negative for chest pain and palpitations  Gastrointestinal: Negative for abdominal pain, diarrhea and nausea  Endocrine: Negative for cold intolerance and heat intolerance  Genitourinary: Negative for dysuria, flank pain and frequency  Musculoskeletal: Negative for arthralgias, back pain, gait problem and myalgias  Skin: Negative for color change  Allergic/Immunologic: Negative for environmental allergies  Neurological: Positive for headaches   Negative for dizziness and numbness  Psychiatric/Behavioral: Negative for behavioral problems and sleep disturbance  The following portions of the patient's history were reviewed and updated as appropriate : past family history, past medical history, past social history and past surgical history  Current Outpatient Medications:     cholecalciferol (VITAMIN D3) 250 MCG (16015 UT) capsule, Take 10,000 Units by mouth daily, Disp: , Rfl:     fexofenadine (ALLEGRA) 60 MG tablet, Take by mouth, Disp: , Rfl:     Nutritional Supplements (VITAMIN D BOOSTER PO), Take by mouth, Disp: , Rfl:     Turmeric ([employer] Turmeric Curcumin) 500 MG CAPS, Take by mouth, Disp: , Rfl:     diphenhydrAMINE (BENADRYL) 25 mg tablet, Take 1 tablet (25 mg total) by mouth every 6 (six) hours as needed for itching (Patient not taking: Reported on 9/9/2021), Disp: 30 tablet, Rfl: 0    famotidine (PEPCID) 40 MG tablet, Take 0 5 tablets (20 mg total) by mouth 2 (two) times a day (Patient not taking: Reported on 9/9/2021), Disp: 20 tablet, Rfl: 0    predniSONE 10 mg tablet, 5 tabs po qd x 2 days then 4 tabs po qd x 2 days then 3 tabs po qd x 2 days then 2 tabs po qd x 2 days then 1 tab po qd x 2 days (Patient not taking: Reported on 9/9/2021), Disp: 30 tablet, Rfl: 0    valACYclovir (VALTREX) 1,000 mg tablet, TAKE 2 TABLETS AT ONSET THEN 2 TABLETS AFTER 12 HOURS (Patient not taking: Reported on 9/9/2021), Disp: , Rfl:     Current Facility-Administered Medications:     diphenhydrAMINE (BENADRYL) oral liquid 50 mg, 50 mg, Oral, Q6H PRN, Inocente Seo PA-C, 50 mg at 08/02/21 2125         Objective:         Vitals:    09/09/21 1414   BP: 118/74   BP Location: Left arm   Patient Position: Sitting   Cuff Size: Large   Pulse: 85   Resp: 18   Temp: 97 9 °F (36 6 °C)   TempSrc: Temporal   SpO2: 96%   Weight: 107 kg (235 lb 6 4 oz)   Height: 5' 11" (1 803 m)     Physical Exam  Vitals reviewed  Constitutional:       Appearance: He is well-developed  HENT:      Head: Normocephalic and atraumatic  Nose: Nose normal       Mouth/Throat:      Pharynx: No oropharyngeal exudate  Eyes:      General: No scleral icterus  Right eye: No discharge  Left eye: No discharge  Pupils: Pupils are equal, round, and reactive to light  Neck:      Trachea: No tracheal deviation  Cardiovascular:      Rate and Rhythm: Normal rate and regular rhythm  Pulses:           Dorsalis pedis pulses are 2+ on the right side and 2+ on the left side  Posterior tibial pulses are 2+ on the right side and 2+ on the left side  Heart sounds: Normal heart sounds  No murmur heard  No friction rub  No gallop  Pulmonary:      Effort: Pulmonary effort is normal  No respiratory distress  Breath sounds: Normal breath sounds  No wheezing or rales  Abdominal:      General: Bowel sounds are normal  There is no distension  Palpations: Abdomen is soft  Tenderness: There is no abdominal tenderness  There is no guarding or rebound  Musculoskeletal:         General: Normal range of motion  Cervical back: Normal range of motion and neck supple  Lymphadenopathy:      Head:      Right side of head: No submental or submandibular adenopathy  Left side of head: No submental or submandibular adenopathy  Cervical: No cervical adenopathy  Right cervical: No superficial, deep or posterior cervical adenopathy  Left cervical: No superficial, deep or posterior cervical adenopathy  Skin:     General: Skin is warm and dry  Findings: No erythema  Neurological:      Mental Status: He is alert and oriented to person, place, and time  Cranial Nerves: No cranial nerve deficit  Sensory: No sensory deficit  Psychiatric:         Mood and Affect: Mood is not anxious or depressed  Speech: Speech normal          Behavior: Behavior normal          Thought Content:  Thought content normal          Judgment: Judgment normal

## 2021-11-02 ENCOUNTER — OFFICE VISIT (OUTPATIENT)
Dept: FAMILY MEDICINE CLINIC | Facility: CLINIC | Age: 61
End: 2021-11-02
Payer: COMMERCIAL

## 2021-11-02 VITALS
DIASTOLIC BLOOD PRESSURE: 88 MMHG | BODY MASS INDEX: 33.52 KG/M2 | TEMPERATURE: 97.6 F | HEART RATE: 62 BPM | SYSTOLIC BLOOD PRESSURE: 130 MMHG | OXYGEN SATURATION: 99 % | WEIGHT: 239.4 LBS | HEIGHT: 71 IN

## 2021-11-02 DIAGNOSIS — Z11.4 SCREENING FOR HIV (HUMAN IMMUNODEFICIENCY VIRUS): ICD-10-CM

## 2021-11-02 DIAGNOSIS — Z00.00 ANNUAL PHYSICAL EXAM: Primary | ICD-10-CM

## 2021-11-02 DIAGNOSIS — Z11.59 ENCOUNTER FOR HEPATITIS C SCREENING TEST FOR LOW RISK PATIENT: ICD-10-CM

## 2021-11-02 DIAGNOSIS — Z23 NEED FOR VACCINATION: ICD-10-CM

## 2021-11-02 DIAGNOSIS — U07.1 COVID-19: ICD-10-CM

## 2021-11-02 DIAGNOSIS — Z13.6 SCREENING FOR CARDIOVASCULAR CONDITION: ICD-10-CM

## 2021-11-02 DIAGNOSIS — Z12.11 SCREEN FOR COLON CANCER: ICD-10-CM

## 2021-11-02 DIAGNOSIS — Z13.1 SCREENING FOR DIABETES MELLITUS: ICD-10-CM

## 2021-11-02 DIAGNOSIS — Z13.29 SCREENING FOR THYROID DISORDER: ICD-10-CM

## 2021-11-02 DIAGNOSIS — Z12.5 SCREENING FOR PROSTATE CANCER: ICD-10-CM

## 2021-11-02 DIAGNOSIS — Z13.0 SCREENING FOR DEFICIENCY ANEMIA: ICD-10-CM

## 2021-11-02 PROCEDURE — 90750 HZV VACC RECOMBINANT IM: CPT | Performed by: FAMILY MEDICINE

## 2021-11-02 PROCEDURE — 99396 PREV VISIT EST AGE 40-64: CPT | Performed by: FAMILY MEDICINE

## 2021-11-02 PROCEDURE — 3008F BODY MASS INDEX DOCD: CPT | Performed by: FAMILY MEDICINE

## 2021-11-02 PROCEDURE — 90471 IMMUNIZATION ADMIN: CPT | Performed by: FAMILY MEDICINE

## 2021-11-23 ENCOUNTER — TELEPHONE (OUTPATIENT)
Dept: GASTROENTEROLOGY | Facility: CLINIC | Age: 61
End: 2021-11-23

## 2021-11-23 ENCOUNTER — PREP FOR PROCEDURE (OUTPATIENT)
Dept: GASTROENTEROLOGY | Facility: CLINIC | Age: 61
End: 2021-11-23

## 2021-11-23 DIAGNOSIS — K57.90 DIVERTICULOSIS: Primary | ICD-10-CM

## 2022-01-18 ENCOUNTER — APPOINTMENT (OUTPATIENT)
Dept: LAB | Facility: CLINIC | Age: 62
End: 2022-01-18
Payer: COMMERCIAL

## 2022-01-18 DIAGNOSIS — Z11.4 SCREENING FOR HIV (HUMAN IMMUNODEFICIENCY VIRUS): ICD-10-CM

## 2022-01-18 DIAGNOSIS — Z12.5 SCREENING FOR PROSTATE CANCER: ICD-10-CM

## 2022-01-18 DIAGNOSIS — U07.1 COVID-19: ICD-10-CM

## 2022-01-18 DIAGNOSIS — Z13.0 SCREENING FOR DEFICIENCY ANEMIA: ICD-10-CM

## 2022-01-18 DIAGNOSIS — Z13.1 SCREENING FOR DIABETES MELLITUS: ICD-10-CM

## 2022-01-18 DIAGNOSIS — Z13.6 SCREENING FOR CARDIOVASCULAR CONDITION: ICD-10-CM

## 2022-01-18 DIAGNOSIS — Z13.29 SCREENING FOR THYROID DISORDER: ICD-10-CM

## 2022-01-18 LAB
ALBUMIN SERPL BCP-MCNC: 3.8 G/DL (ref 3.5–5)
ALP SERPL-CCNC: 89 U/L (ref 46–116)
ALT SERPL W P-5'-P-CCNC: 31 U/L (ref 12–78)
ANION GAP SERPL CALCULATED.3IONS-SCNC: 5 MMOL/L (ref 4–13)
AST SERPL W P-5'-P-CCNC: 17 U/L (ref 5–45)
BASOPHILS # BLD AUTO: 0.05 THOUSANDS/ΜL (ref 0–0.1)
BASOPHILS NFR BLD AUTO: 1 % (ref 0–1)
BILIRUB SERPL-MCNC: 0.64 MG/DL (ref 0.2–1)
BUN SERPL-MCNC: 14 MG/DL (ref 5–25)
CALCIUM SERPL-MCNC: 9.1 MG/DL (ref 8.3–10.1)
CHLORIDE SERPL-SCNC: 110 MMOL/L (ref 100–108)
CHOLEST SERPL-MCNC: 200 MG/DL
CO2 SERPL-SCNC: 27 MMOL/L (ref 21–32)
CREAT SERPL-MCNC: 0.98 MG/DL (ref 0.6–1.3)
EOSINOPHIL # BLD AUTO: 0.13 THOUSAND/ΜL (ref 0–0.61)
EOSINOPHIL NFR BLD AUTO: 2 % (ref 0–6)
ERYTHROCYTE [DISTWIDTH] IN BLOOD BY AUTOMATED COUNT: 13.5 % (ref 11.6–15.1)
GFR SERPL CREATININE-BSD FRML MDRD: 82 ML/MIN/1.73SQ M
GLUCOSE P FAST SERPL-MCNC: 91 MG/DL (ref 65–99)
HCT VFR BLD AUTO: 46.2 % (ref 36.5–49.3)
HDLC SERPL-MCNC: 50 MG/DL
HGB BLD-MCNC: 15.3 G/DL (ref 12–17)
IMM GRANULOCYTES # BLD AUTO: 0.02 THOUSAND/UL (ref 0–0.2)
IMM GRANULOCYTES NFR BLD AUTO: 0 % (ref 0–2)
LDLC SERPL CALC-MCNC: 138 MG/DL (ref 0–100)
LYMPHOCYTES # BLD AUTO: 1.18 THOUSANDS/ΜL (ref 0.6–4.47)
LYMPHOCYTES NFR BLD AUTO: 18 % (ref 14–44)
MCH RBC QN AUTO: 30.5 PG (ref 26.8–34.3)
MCHC RBC AUTO-ENTMCNC: 33.1 G/DL (ref 31.4–37.4)
MCV RBC AUTO: 92 FL (ref 82–98)
MONOCYTES # BLD AUTO: 0.49 THOUSAND/ΜL (ref 0.17–1.22)
MONOCYTES NFR BLD AUTO: 8 % (ref 4–12)
NEUTROPHILS # BLD AUTO: 4.57 THOUSANDS/ΜL (ref 1.85–7.62)
NEUTS SEG NFR BLD AUTO: 71 % (ref 43–75)
NRBC BLD AUTO-RTO: 0 /100 WBCS
PLATELET # BLD AUTO: 207 THOUSANDS/UL (ref 149–390)
PMV BLD AUTO: 10.9 FL (ref 8.9–12.7)
POTASSIUM SERPL-SCNC: 4.2 MMOL/L (ref 3.5–5.3)
PROT SERPL-MCNC: 7.3 G/DL (ref 6.4–8.2)
RBC # BLD AUTO: 5.01 MILLION/UL (ref 3.88–5.62)
SARS-COV-2 IGG SERPL QL IA: REACTIVE
SARS-COV-2 IGG+IGM SERPL QL IA: REACTIVE
SODIUM SERPL-SCNC: 142 MMOL/L (ref 136–145)
TRIGL SERPL-MCNC: 60 MG/DL
TSH SERPL DL<=0.05 MIU/L-ACNC: 1.56 UIU/ML (ref 0.36–3.74)
WBC # BLD AUTO: 6.44 THOUSAND/UL (ref 4.31–10.16)

## 2022-01-18 PROCEDURE — G0103 PSA SCREENING: HCPCS

## 2022-01-18 PROCEDURE — 36415 COLL VENOUS BLD VENIPUNCTURE: CPT

## 2022-01-18 PROCEDURE — 84443 ASSAY THYROID STIM HORMONE: CPT

## 2022-01-18 PROCEDURE — 80053 COMPREHEN METABOLIC PANEL: CPT

## 2022-01-18 PROCEDURE — 86769 SARS-COV-2 COVID-19 ANTIBODY: CPT

## 2022-01-18 PROCEDURE — 87389 HIV-1 AG W/HIV-1&-2 AB AG IA: CPT

## 2022-01-18 PROCEDURE — 85025 COMPLETE CBC W/AUTO DIFF WBC: CPT

## 2022-01-18 PROCEDURE — 80061 LIPID PANEL: CPT

## 2022-01-19 LAB
HIV 1+2 AB+HIV1 P24 AG SERPL QL IA: NORMAL
PSA SERPL-MCNC: 1.1 NG/ML (ref 0–4)

## 2022-01-24 ENCOUNTER — TELEPHONE (OUTPATIENT)
Dept: GASTROENTEROLOGY | Facility: MEDICAL CENTER | Age: 62
End: 2022-01-24

## 2022-01-31 ENCOUNTER — TELEPHONE (OUTPATIENT)
Dept: GASTROENTEROLOGY | Facility: MEDICAL CENTER | Age: 62
End: 2022-01-31

## 2022-02-01 ENCOUNTER — ANESTHESIA (OUTPATIENT)
Dept: GASTROENTEROLOGY | Facility: MEDICAL CENTER | Age: 62
End: 2022-02-01

## 2022-02-01 ENCOUNTER — ANESTHESIA EVENT (OUTPATIENT)
Dept: GASTROENTEROLOGY | Facility: MEDICAL CENTER | Age: 62
End: 2022-02-01

## 2022-02-01 ENCOUNTER — HOSPITAL ENCOUNTER (OUTPATIENT)
Dept: GASTROENTEROLOGY | Facility: MEDICAL CENTER | Age: 62
Setting detail: OUTPATIENT SURGERY
Discharge: HOME | End: 2022-02-01
Attending: INTERNAL MEDICINE | Admitting: INTERNAL MEDICINE
Payer: COMMERCIAL

## 2022-02-01 VITALS
BODY MASS INDEX: 33.64 KG/M2 | OXYGEN SATURATION: 99 % | WEIGHT: 240.3 LBS | DIASTOLIC BLOOD PRESSURE: 78 MMHG | HEART RATE: 51 BPM | TEMPERATURE: 98.5 F | SYSTOLIC BLOOD PRESSURE: 101 MMHG | RESPIRATION RATE: 20 BRPM | HEIGHT: 71 IN

## 2022-02-01 DIAGNOSIS — K57.90 DIVERTICULOSIS: ICD-10-CM

## 2022-02-01 PROCEDURE — 45380 COLONOSCOPY AND BIOPSY: CPT | Performed by: INTERNAL MEDICINE

## 2022-02-01 PROCEDURE — 88305 TISSUE EXAM BY PATHOLOGIST: CPT | Performed by: PATHOLOGY

## 2022-02-01 RX ORDER — LIDOCAINE HYDROCHLORIDE 20 MG/ML
INJECTION, SOLUTION EPIDURAL; INFILTRATION; INTRACAUDAL; PERINEURAL AS NEEDED
Status: DISCONTINUED | OUTPATIENT
Start: 2022-02-01 | End: 2022-02-01

## 2022-02-01 RX ORDER — PROPOFOL 10 MG/ML
INJECTION, EMULSION INTRAVENOUS AS NEEDED
Status: DISCONTINUED | OUTPATIENT
Start: 2022-02-01 | End: 2022-02-01

## 2022-02-01 RX ORDER — PROPOFOL 10 MG/ML
INJECTION, EMULSION INTRAVENOUS CONTINUOUS PRN
Status: DISCONTINUED | OUTPATIENT
Start: 2022-02-01 | End: 2022-02-01

## 2022-02-01 RX ORDER — SODIUM CHLORIDE 9 MG/ML
125 INJECTION, SOLUTION INTRAVENOUS CONTINUOUS
Status: DISCONTINUED | OUTPATIENT
Start: 2022-02-01 | End: 2022-02-05 | Stop reason: HOSPADM

## 2022-02-01 RX ADMIN — SODIUM CHLORIDE 125 ML/HR: 9 INJECTION, SOLUTION INTRAVENOUS at 10:58

## 2022-02-01 RX ADMIN — Medication 40 MG: at 11:36

## 2022-02-01 RX ADMIN — PROPOFOL 100 MCG/KG/MIN: 10 INJECTION, EMULSION INTRAVENOUS at 11:31

## 2022-02-01 RX ADMIN — LIDOCAINE HYDROCHLORIDE 100 MG: 20 INJECTION, SOLUTION EPIDURAL; INFILTRATION; INTRACAUDAL; PERINEURAL at 11:31

## 2022-02-01 RX ADMIN — PROPOFOL 120 MG: 10 INJECTION, EMULSION INTRAVENOUS at 11:31

## 2022-02-01 NOTE — H&P
History and Physical -  [facility]  John Samaniego 64 y o  male MRN: [id number]                  HPI: John Samaniego is a 64y o  year old male who presents for family history of colon cancer  REVIEW OF SYSTEMS: Per the HPI, and otherwise unremarkable  Historical Information   Past Medical History:   Diagnosis Date    Arthritis     OA right knee    Cancer ([facility] )     SCC cheek in past    [facility]     Hearing loss     hearing aide denis    History of DVT of lower extremity     RLE    Pulmonary embolism [facility] ) 1994    Wears glasses     reading     Past Surgical History:   Procedure Laterality Date    COLONOSCOPY      COLONOSCOPY N/A 5/11/2016    Procedure: COLONOSCOPY;  Surgeon: Katie Lopez MD;  Location: [facility];   Service:     CYST REMOVAL      back    KNEE ARTHROSCOPY Bilateral     KNEE SURGERY Right     SKIN CANCER EXCISION      face, MARU procedure    TOTAL KNEE ARTHROPLASTY Right 12/5/2017    Procedure: ARTHROPLASTY KNEE TOTAL;  Surgeon: Angelito Pritchard MD;  Location[facility];  Service: Orthopedics     Social History   Social History     Substance and Sexual Activity   Alcohol Use No     Social History     Substance and Sexual Activity   Drug Use No     Social History     Tobacco Use   Smoking Status Current Some Day Smoker    Years: 7 00    Types: Cigars   Smokeless Tobacco Never Used   Tobacco Comment    occ cigar     Family History   Problem Relation Age of Onset    Alzheimer's disease Mother         other onset without behavioral disturbance    Heart failure Father         acute CHF    Leukemia Brother     Diabetes Paternal Grandmother     Cancer Paternal Grandfather        Meds/Allergies       Current Outpatient Medications:     cholecalciferol (VITAMIN D3) 250 MCG (07611 UT) capsule    fexofenadine (ALLEGRA) 60 MG tablet    Nutritional Supplements (VITAMIN D BOOSTER PO)    Turmeric ([employer]S Turmeric Curcumin) 500 MG CAPS   diphenhydrAMINE (BENADRYL) 25 mg tablet    famotidine (PEPCID) 40 MG tablet    predniSONE 10 mg tablet    valACYclovir (VALTREX) 1,000 mg tablet    Current Facility-Administered Medications:     diphenhydrAMINE (BENADRYL) oral liquid 50 mg, 50 mg, Oral, Q6H PRN, 50 mg at 08/02/21 2125    sodium chloride 0 9 % infusion, 125 mL/hr, Intravenous, Continuous, 125 mL/hr at 02/01/22 1058    Allergies   Allergen Reactions    Celecoxib     Peanut Oil - Food Allergy     Nuts - Food Allergy Hives    Soybean-Containing Drug Products - Food Allergy Hives    Wheat Bran - Food Allergy Hives       Objective     /73   Pulse 55   Temp 98 5 °F (36 9 °C) (Temporal)   Resp 18   Ht 5' 11" (1 803 m)   Wt 109 kg (240 lb 4 8 oz)   SpO2 98%   BMI 33 52 kg/m²       PHYSICAL EXAM    Gen: NAD  Head: NCAT  CV: RRR  CHEST: Clear  ABD: soft, NT/ND  EXT: no edema      ASSESSMENT/PLAN:  This is a 64y o  year old male here for colonoscopy, and he is stable and optimized for his procedure

## 2022-02-01 NOTE — ANESTHESIA PREPROCEDURE EVALUATION
Procedure:  COLONOSCOPY    Relevant Problems   GI/HEPATIC   (+) GERD without esophagitis      MUSCULOSKELETAL   (+) Arthritis of right knee   (+) Disc degeneration, lumbar   (+) Osteoarthritis of both knees      NEURO/PSYCH   (+) Depression        Physical Exam    Airway    Mallampati score: I  TM Distance: <3 FB  Neck ROM: full     Dental       Cardiovascular  Rhythm: regular, Rate: normal, Cardiovascular exam normal    Pulmonary  Pulmonary exam normal     Other Findings        Anesthesia Plan  ASA Score- 2     Anesthesia Type- IV sedation with anesthesia with ASA Monitors  Additional Monitors:   Airway Plan:           Plan Factors-Exercise tolerance (METS): >4 METS  Chart reviewed  Existing labs reviewed  Patient summary reviewed  Patient is not a current smoker  Patient did not smoke on day of surgery  Obstructive sleep apnea risk education given perioperatively  Induction- intravenous  Postoperative Plan-     Informed Consent- Anesthetic plan and risks discussed with patient

## 2022-02-01 NOTE — DISCHARGE INSTRUCTIONS
col    Colorectal Polyps   WHAT YOU NEED TO KNOW:   Colorectal polyps are small growths of tissue in the lining of the colon and rectum  Most polyps are hyperplastic polyps and are usually benign (noncancerous)  Certain types of polyps, called adenomatous polyps, may turn into cancer  DISCHARGE INSTRUCTIONS:   Follow up with your healthcare provider or gastroenterologist as directed: You may need to return for more tests, such as another colonoscopy  Write down your questions so you remember to ask them during your visits  Reduce your risk for colorectal polyps:   · Eat a variety of healthy foods:  Healthy foods include fruit, vegetables, whole-grain breads, low-fat dairy products, beans, lean meat, and fish  Ask if you need to be on a special diet  · Maintain a healthy weight:  Ask your healthcare provider if you need to lose weight and how much you need to lose  Ask for help with a weight loss program     · Exercise:  Begin to exercise slowly and do more as you get stronger  Talk with your healthcare provider before you start an exercise program      · Limit alcohol:  Your risk for polyps increases the more you drink  · Do not smoke: If you smoke, it is never too late to quit  Ask for information about how to stop  For support and more information:   · [facility] ([facility])  [address], [place] [address]  Phone: 6- 228 - [phone]  Web Address: www digestive  niddk nih gov    Contact your healthcare provider or gastroenterologist if:   · You have a fever  · You have chills, a cough, or feel weak and achy  · You have abdominal pain that does not go away or gets worse after you take medicine  · Your abdomen is swollen  · You are losing weight without trying  · You have questions or concerns about your condition or care  Seek care immediately or call 911 if:   · You have sudden shortness of breath       · You have a fast heart rate, fast breathing, or are too dizzy to stand up  · You have severe abdominal pain  · You see blood in your bowel movement  © Copyright [address] Information is for End User's use only and may not be sold, redistributed or otherwise used for commercial purposes  All illustrations and images included in CareNotes® are the copyrighted property of [employer]  or [address] [place], [place] [address]jus Gonzalez   The above information is an  only  It is not intended as medical advice for individual conditions or treatments  Talk to your doctor, nurse or pharmacist before following any medical regimen to see if it is safe and effective for you

## 2022-02-03 NOTE — RESULT ENCOUNTER NOTE
Polyp was not adenoma but still plan to repeat in 5 years because of his family history  This was communicated via [address]

## 2022-03-31 ENCOUNTER — EVALUATION (OUTPATIENT)
Dept: PHYSICAL THERAPY | Facility: CLINIC | Age: 62
End: 2022-03-31
Payer: COMMERCIAL

## 2022-03-31 DIAGNOSIS — M16.0 PRIMARY OSTEOARTHRITIS OF BOTH HIPS: Primary | ICD-10-CM

## 2022-03-31 PROCEDURE — 97110 THERAPEUTIC EXERCISES: CPT | Performed by: PHYSICAL THERAPIST

## 2022-03-31 PROCEDURE — 97161 PT EVAL LOW COMPLEX 20 MIN: CPT | Performed by: PHYSICAL THERAPIST

## 2022-03-31 NOTE — LETTER
[id number]    MD Adelso Daniels [address] [facility]    Patient: Hector Piper   YOB: 1960   Date of Visit: 3/31/2022     Encounter Diagnosis     ICD-10-CM    1  Primary osteoarthritis of both hips  M16 0        Dear Dr Denis Swanson: Thank you for your recent referral of Hector Piper  Please review the attached evaluation summary from Jethro's recent visit  Please verify that you agree with the plan of care by signing the attached order  If you have any questions or concerns, please do not hesitate to call  I sincerely appreciate the opportunity to share in the care of one of your patients and hope to have another opportunity to work with you in the near future  Sincerely,    Marco A Livingston, PT      Referring Provider:      I certify that I have read the below Plan of Care and certify the need for these services furnished under this plan of treatment while under my care  MD Adelso Daniels 3 [phone]  Via Fax: [phone]          PT Evaluation     Today's date: 3/31/2022  Patient name: Hector Piper  : 1960  MRN: [id number]  Referring provider: Jimmy Hooker MD  Dx:   Encounter Diagnosis     ICD-10-CM    1  Primary osteoarthritis of both hips  M16 0        Start Time: 1115  Stop Time: 1212  Total time in clinic (min): 57 minutes    Assessment  Assessment details: Patient is a 64 y o  male who  presents with pain, range of motion loss, weakness, proprioceptive with bilateral hip OA  Functional limitations as a result of impairments  Patient would benefit from course of skilled physical therapy to address above listed impairments in an effort to improve function      Will make sure he has a good grasp on HEP ,refine/review as necessary next week, the plan on working independently until any inevitable surgical intervention,    Understanding of Dx/Px/POC: excellent  Goals  Short Term Goals:  1) Pain : Decrease hip pain to 5/10 at worst x 1 continuous week within 2-3 weeks  2) AROM: Improve hip AROM by at least 10 degrees for flexion,  ER within 2-3 weeks  3) Strength: Improved LE strength by at least 1/2 grade for all noted as weak within 2-3 weeks  4) Function: Improved FOTO score from IE within 2-3  Weeks (65@ IE) , patient to note greater ease of ambulation subjectively  5) Independent with HEP within 2 weeks  LongTerm Goals:  1) Pain : Decrease hip pain to 3/10 x 1 continuous week within 4-6 weeks  2) Strength: Improved LE strength by at least 1 grade within 4-6 weeks  3) Function: Improved FOTO score to at least 74 within 4-6 weeks  Plan  Patient would benefit from: skilled PT  Planned modality interventions: electrical stimulation/Russian stimulation, TENS, thermotherapy: hydrocollator packs, cryotherapy, ultrasound and low level laser therapy  Planned therapy interventions: ADL training, balance/weight bearing training, functional ROM exercises, home exercise program, joint mobilization, manual therapy, neuromuscular re-education, patient education, strengthening, stretching, therapeutic activities, therapeutic exercise, gait training and transfer training  Frequency: 2x week (1-2x's per week)  Treatment plan discussed with: patient        Subjective Evaluation    History of Present Illness  Mechanism of injury: Patient is a 64 y o  old male who presents for an initial outpatient physical therapy consultation regarding his bilateral hip pain  About one year histeoy of bilateral hip pain, R worse than L  C/o groin pain, at times feel like a nail is being driven into hip  X-rays (+) for OA, planning on likely hip replacement in the fall  In the meantime referred for course of therapy, trying to get some pain relief through the summer/golf season  Feels he is limping, walking tolerance limited, especially on hills  Has difficulty putting shoes and socks on      Takes meloxicam prn      Pain  Current pain ratin  At worst pain ratin    Social Support    Employment status: working (nothing  physical, hip pain does not interrupt work tasks)  Exercise history: [place] bike about once a week  Patient Goals  Patient goals for therapy: decreased pain, increased motion, increased strength and return to sport/leisure activities  Patient goal: learn the best exercises to do leading up to surgery        Objective     Active Range of Motion   Left Hip   Flexion: 90 degrees with pain  Extension: 10 degrees with pain  Abduction: 30 degrees with pain  External rotation (90/90): 15 degrees with pain  Internal rotation (90/90): 5 degrees with pain    Right Hip   Flexion: 80 degrees with pain  Extension: 10 degrees with pain  Abduction: 25 degrees with pain  External rotation (90/90): 20 degrees with pain  Internal rotation (90/90): 10 degrees with pain    Passive Range of Motion   Left Hip   Flexion: 100 degrees with pain  Internal rotation (90/90): 10 degrees with pain    Right Hip   Flexion: 90 degrees with pain  Internal rotation (90/90): 10 degrees with pain    Strength/Myotome Testing     Left Hip   Planes of Motion   Flexion: 4- (pain)  Abduction: 3+ (pain)    Right Hip   Planes of Motion   Flexion: 3+ (pain)  Abduction: 3    Tests     Left Hip   Jethro: Positive  Right Hip   Jethro: Positive  Ambulation   Weight-Bearing Status   Weight-Bearing Status (Left): full weight bearing   Weight-Bearing Status (Right): full weight-bearing      Observational Gait   Gait: antalgic   Decreased right stance time  Functional Assessment        Single Leg Stance - Eyes Open   Left  Trial 1: 10 seconds    Right  Trial 1: 12 seconds             Precautions: PMH of DVT and PE      Manuals 3/31/22            Manual Hip flexor stretch of side of table   RG                                                   Neuro Re-Ed             Slow march with movement NV Ther Ex             Upright Bike NV            Leg Press NV            Multip hip ABD 10#            Clamshells with resistance NV                                                   Review HEP, add pillow squeeze into bridge NV            Ther Activity                                       Gait Training                                       Modalities             MH, Cryo prn             IE/HEP RG

## 2022-03-31 NOTE — PROGRESS NOTES
PT Evaluation     Today's date: 3/31/2022  Patient name: Jermaine Fritz  : 1960  MRN: [id number]  Referring provider: Vladimir Pleitez MD  Dx:   Encounter Diagnosis     ICD-10-CM    1  Primary osteoarthritis of both hips  M16 0        Start Time: 1115  Stop Time: 1212  Total time in clinic (min): 57 minutes    Assessment  Assessment details: Patient is a 64 y o  male who  presents with pain, range of motion loss, weakness, proprioceptive with bilateral hip OA  Functional limitations as a result of impairments  Patient would benefit from course of skilled physical therapy to address above listed impairments in an effort to improve function  Will make sure he has a good grasp on HEP ,refine/review as necessary next week, the plan on working independently until any inevitable surgical intervention,    Understanding of Dx/Px/POC: excellent  Goals  Short Term Goals:  1) Pain : Decrease hip pain to 5/10 at worst x 1 continuous week within 2-3 weeks  2) AROM: Improve hip AROM by at least 10 degrees for flexion,  ER within 2-3 weeks  3) Strength: Improved LE strength by at least 1/2 grade for all noted as weak within 2-3 weeks  4) Function: Improved FOTO score from IE within 2-3  Weeks (65@ IE) , patient to note greater ease of ambulation subjectively  5) Independent with HEP within 2 weeks  LongTerm Goals:  1) Pain : Decrease hip pain to 3/10 x 1 continuous week within 4-6 weeks  2) Strength: Improved LE strength by at least 1 grade within 4-6 weeks  3) Function: Improved FOTO score to at least 74 within 4-6 weeks          Plan  Patient would benefit from: skilled PT  Planned modality interventions: electrical stimulation/Russian stimulation, TENS, thermotherapy: hydrocollator packs, cryotherapy, ultrasound and low level laser therapy  Planned therapy interventions: ADL training, balance/weight bearing training, functional ROM exercises, home exercise program, joint mobilization, manual therapy, neuromuscular re-education, patient education, strengthening, stretching, therapeutic activities, therapeutic exercise, gait training and transfer training  Frequency: 2x week (1-2x's per week)  Treatment plan discussed with: patient        Subjective Evaluation    History of Present Illness  Mechanism of injury: Patient is a 64 y o  old male who presents for an initial outpatient physical therapy consultation regarding his bilateral hip pain  About one year histeoy of bilateral hip pain, R worse than L  C/o groin pain, at times feel like a nail is being driven into hip  X-rays (+) for OA, planning on likely hip replacement in the fall  In the meantime referred for course of therapy, trying to get some pain relief through the summer/gol season  Feels he is limping, walking tolerance limited, especially on hills  Has difficulty putting shoes and socks on  Takes meloxicam prn      Pain  Current pain ratin  At worst pain ratin    Social Support    Employment status: working (nothing  physical, hip pain does not interrupt work tasks)  Exercise history: [place] bike about once a week      Patient Goals  Patient goals for therapy: decreased pain, increased motion, increased strength and return to sport/leisure activities  Patient goal: learn the best exercises to do leading up to surgery        Objective     Active Range of Motion   Left Hip   Flexion: 90 degrees with pain  Extension: 10 degrees with pain  Abduction: 30 degrees with pain  External rotation (90/90): 15 degrees with pain  Internal rotation (90/90): 5 degrees with pain    Right Hip   Flexion: 80 degrees with pain  Extension: 10 degrees with pain  Abduction: 25 degrees with pain  External rotation (90/90): 20 degrees with pain  Internal rotation (90/90): 10 degrees with pain    Passive Range of Motion   Left Hip   Flexion: 100 degrees with pain  Internal rotation (90/90): 10 degrees with pain    Right Hip   Flexion: 90 degrees with pain  Internal rotation (90/90): 10 degrees with pain    Strength/Myotome Testing     Left Hip   Planes of Motion   Flexion: 4- (pain)  Abduction: 3+ (pain)    Right Hip   Planes of Motion   Flexion: 3+ (pain)  Abduction: 3    Tests     Left Hip   Jethro: Positive  Right Hip   Jethro: Positive  Ambulation   Weight-Bearing Status   Weight-Bearing Status (Left): full weight bearing   Weight-Bearing Status (Right): full weight-bearing      Observational Gait   Gait: antalgic   Decreased right stance time  Functional Assessment        Single Leg Stance - Eyes Open   Left  Trial 1: 10 seconds    Right  Trial 1: 12 seconds             Precautions: PMH of DVT and PE      Manuals 3/31/22            Manual Hip flexor stretch of side of table   RG                                                   Neuro Re-Ed             Slow march with movement NV                                                                                          Ther Ex             Upright Bike NV            Leg Press NV            Multip hip ABD 10#            Clamshells with resistance NV                                                   Review HEP, add pillow squeeze into bridge NV            Ther Activity                                       Gait Training                                       Modalities             MH, Cryo prn             IE/HEP RG

## 2022-04-04 NOTE — PROGRESS NOTES
[facility] called to cancel appt for tomorrow  Feels he can work with HEP I gave him at [facility] surgery  Now discharged from my care

## 2022-10-29 ENCOUNTER — OFFICE VISIT (OUTPATIENT)
Dept: FAMILY MEDICINE CLINIC | Facility: CLINIC | Age: 62
End: 2022-10-29

## 2022-10-29 ENCOUNTER — APPOINTMENT (OUTPATIENT)
Dept: LAB | Facility: CLINIC | Age: 62
End: 2022-10-29

## 2022-10-29 VITALS
OXYGEN SATURATION: 98 % | HEART RATE: 57 BPM | BODY MASS INDEX: 33.38 KG/M2 | WEIGHT: 238.4 LBS | RESPIRATION RATE: 18 BRPM | HEIGHT: 71 IN | DIASTOLIC BLOOD PRESSURE: 80 MMHG | SYSTOLIC BLOOD PRESSURE: 120 MMHG | TEMPERATURE: 98 F

## 2022-10-29 DIAGNOSIS — Z01.89 RADIOLOGICAL EXAMINATION, NOT ELSEWHERE CLASSIFIED: ICD-10-CM

## 2022-10-29 DIAGNOSIS — Z01.818 PRE-OP EXAMINATION: Primary | ICD-10-CM

## 2022-10-29 DIAGNOSIS — Z01.818 OTHER SPECIFIED PRE-OPERATIVE EXAMINATION: ICD-10-CM

## 2022-10-29 DIAGNOSIS — M16.11 ARTHRITIS OF RIGHT HIP: ICD-10-CM

## 2022-10-29 LAB
ALBUMIN SERPL BCP-MCNC: 3.7 G/DL (ref 3.5–5)
ALP SERPL-CCNC: 76 U/L (ref 46–116)
ALT SERPL W P-5'-P-CCNC: 33 U/L (ref 12–78)
ANION GAP SERPL CALCULATED.3IONS-SCNC: 4 MMOL/L (ref 4–13)
AST SERPL W P-5'-P-CCNC: 20 U/L (ref 5–45)
BASOPHILS # BLD AUTO: 0.05 THOUSANDS/ÂΜL (ref 0–0.1)
BASOPHILS NFR BLD AUTO: 1 % (ref 0–1)
BILIRUB SERPL-MCNC: 0.79 MG/DL (ref 0.2–1)
BUN SERPL-MCNC: 12 MG/DL (ref 5–25)
CALCIUM SERPL-MCNC: 9.5 MG/DL (ref 8.3–10.1)
CHLORIDE SERPL-SCNC: 110 MMOL/L (ref 96–108)
CO2 SERPL-SCNC: 26 MMOL/L (ref 21–32)
CREAT SERPL-MCNC: 1.14 MG/DL (ref 0.6–1.3)
EOSINOPHIL # BLD AUTO: 0.13 THOUSAND/ÂΜL (ref 0–0.61)
EOSINOPHIL NFR BLD AUTO: 2 % (ref 0–6)
ERYTHROCYTE [DISTWIDTH] IN BLOOD BY AUTOMATED COUNT: 13.6 % (ref 11.6–15.1)
GFR SERPL CREATININE-BSD FRML MDRD: 68 ML/MIN/1.73SQ M
GLUCOSE P FAST SERPL-MCNC: 102 MG/DL (ref 65–99)
HCT VFR BLD AUTO: 46.2 % (ref 36.5–49.3)
HGB BLD-MCNC: 15 G/DL (ref 12–17)
IMM GRANULOCYTES # BLD AUTO: 0.03 THOUSAND/UL (ref 0–0.2)
IMM GRANULOCYTES NFR BLD AUTO: 0 % (ref 0–2)
LYMPHOCYTES # BLD AUTO: 1.47 THOUSANDS/ÂΜL (ref 0.6–4.47)
LYMPHOCYTES NFR BLD AUTO: 21 % (ref 14–44)
MCH RBC QN AUTO: 30.2 PG (ref 26.8–34.3)
MCHC RBC AUTO-ENTMCNC: 32.5 G/DL (ref 31.4–37.4)
MCV RBC AUTO: 93 FL (ref 82–98)
MONOCYTES # BLD AUTO: 0.55 THOUSAND/ÂΜL (ref 0.17–1.22)
MONOCYTES NFR BLD AUTO: 8 % (ref 4–12)
NEUTROPHILS # BLD AUTO: 4.72 THOUSANDS/ÂΜL (ref 1.85–7.62)
NEUTS SEG NFR BLD AUTO: 68 % (ref 43–75)
NRBC BLD AUTO-RTO: 0 /100 WBCS
PLATELET # BLD AUTO: 242 THOUSANDS/UL (ref 149–390)
PMV BLD AUTO: 11.6 FL (ref 8.9–12.7)
POTASSIUM SERPL-SCNC: 4.4 MMOL/L (ref 3.5–5.3)
PROT SERPL-MCNC: 7.3 G/DL (ref 6.4–8.4)
RBC # BLD AUTO: 4.96 MILLION/UL (ref 3.88–5.62)
SODIUM SERPL-SCNC: 140 MMOL/L (ref 135–147)
WBC # BLD AUTO: 6.95 THOUSAND/UL (ref 4.31–10.16)

## 2022-10-29 NOTE — PROGRESS NOTES
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  Would consider postop DVT prophylaxis with anticoagulant  (Eliquis, if Orthopedics feels it is appropriate)    ECG reviewed today is unremarkable  Labwork including CBC, CMP and UA all fine  Patient is at low risk for proposed surgery and is medically cleared to proceed  Orders:  -     POCT ECG    2  Arthritis of right hip           Subjective      Alise Rodarte  58 y o   male    SURGEON:Jannette    SURGERY/PROCEDURE:  Right total hip arthroplasty    DATE OF SURGERY: 22    PRIOR ANESTHESIA:yes    COMPLICATION: no    BLEEDING PROBLEM: no    PERTINENT PMH:  Osteoarthritis, reflux    EXERCISE CAPACITY:   CAN WALK 4 BLOCKS AND OR CLIMB 2 FLIGHTS: Yes    HOME LIVING SITUATION SAFE AND SECURE: Yes      TOBACCO: yes, occasional cigar     ETOH: no     ILLEGAL DRUGS: no    Patient was seen for preop clearance for upcoming right total hips replacement  He is on no prescription medications  Chronic medical problems listed above  He feels well and has no health concerns right now other than his orthopedic ones  Review of Systems   Constitutional: Negative  Respiratory: Negative  Cardiovascular: Negative  Gastrointestinal: Negative  Genitourinary: Negative  Musculoskeletal: Positive for arthralgias  Psychiatric/Behavioral: Negative          Current Outpatient Medications on File Prior to Visit   Medication Sig   • fexofenadine (ALLEGRA) 60 MG tablet Take by mouth   • Nutritional Supplements (VITAMIN D BOOSTER PO) Take by mouth   • valACYclovir (VALTREX) 1,000 mg tablet TAKE 2 TABLETS AT ONSET THEN 2 TABLETS AFTER 12 HOURS (Patient not taking: Reported on 9/9/2021)       Objective     /80   Pulse 57   Temp 98 °F (36 7 °C) (Tympanic)   Resp 18   Ht 5' 11" (1 803 m)   Wt 108 kg (238 lb 6 4 oz)   SpO2 98%   BMI 33 25 kg/m²     Physical Exam  Vitals and nursing note reviewed  Constitutional:       General: He is not in acute distress  Appearance: He is well-developed  He is not diaphoretic  HENT:      Head: Normocephalic and atraumatic  Eyes:      General:         Right eye: No discharge  Conjunctiva/sclera: Conjunctivae normal       Pupils: Pupils are equal, round, and reactive to light  Neck:      Thyroid: No thyromegaly  Cardiovascular:      Rate and Rhythm: Normal rate and regular rhythm  Pulmonary:      Effort: Pulmonary effort is normal  No respiratory distress  Breath sounds: Normal breath sounds  Abdominal:      General: Abdomen is flat  Tenderness: There is no abdominal tenderness  Musculoskeletal:      Cervical back: Normal range of motion  Lymphadenopathy:      Cervical: No cervical adenopathy  Skin:     General: Skin is warm and dry  Neurological:      Mental Status: He is alert and oriented to person, place, and time  Psychiatric:         Behavior: Behavior normal          Thought Content:  Thought content normal          Judgment: Judgment normal        Zachary Caballero DO

## 2022-10-29 NOTE — ASSESSMENT & PLAN NOTE
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

## 2022-11-07 LAB — EXT SARS-COV-2: NOT DETECTED

## 2022-11-08 ENCOUNTER — EVALUATION (OUTPATIENT)
Dept: PHYSICAL THERAPY | Facility: CLINIC | Age: 62
End: 2022-11-08

## 2022-11-08 DIAGNOSIS — M16.11 PRIMARY OSTEOARTHRITIS OF RIGHT HIP: Primary | ICD-10-CM

## 2022-11-08 NOTE — LETTER
[id number]    MD Adelso Rose [address] [facility]    Patient: Lucia Duncan   YOB: 1960   Date of Visit: 2022     Encounter Diagnosis     ICD-10-CM    1  Primary osteoarthritis of right hip  M16 11        Dear Dr Omari Benson: Thank you for your recent referral of Lucia Duncan  Please review the attached evaluation summary from Jethro's recent visit  Please verify that you agree with the plan of care by signing the attached order  If you have any questions or concerns, please do not hesitate to call  I sincerely appreciate the opportunity to share in the care of one of your patients and hope to have another opportunity to work with you in the near future  Sincerely,    Adrianne Armas, PT      Referring Provider:      I certify that I have read the below Plan of Care and certify the need for these services furnished under this plan of treatment while under my care  MD Adelso Rose [address] [place] [id number]  Via Fax: [phone]          Physical Therapy Evaluation     Today's date: 2022  Patient name: Lucia Duncan  : 1960  MRN: [id number]  Referring provider: Shelley Recinos MD  Dx:   Encounter Diagnosis     ICD-10-CM    1  Primary osteoarthritis of right hip  M16 11               Assessment  Assessment details: Patient is a 64 y o  male who  presents with pain, range of motion loss, weakness, proprioceptive with a diagnosis of severe R hip OA  He has functional limitations as a result of impairments  He has a total hip replacement scheduled for 22  Today provided the following services to patient in addition to PT evaluation      Gait Training with wheeled walker  Reviewed current and  immediate post-operative home exercise program  Virtual Home Assessment  Home Preparation Checklist was reviewed with patient including identification of care partner and encouragement of single level set-up  Post-operative pain management expectations  Discuss DOS and answered patient's questions      Understanding of Dx/Px/POC: excellent  Goals  ST: Pt to verbalize understanding of HEP and home preparation awaiting R  MAHENDRA (within 72 hours)  LT: Pt to help maximize post op recovery throuhg use of pre-op ROM, flexibility, and strenthening exercises  - will follow up post op  Plan  One more prehab visit, surgery 22, then will follow up for first post op visit the following 22  Subjective Evaluation     History of Present Illness  Mechanism of injury: Patient is a 64 y o  old male who presents for an initial outpatient physical therapy consultation regarding his R hip pain  About one year history of bilateral hip pain, R worse than L    X-rays (+) for OA, Did one visit of physical therapy in 2022  To learn some exercises to  try to get some pain relief through the summer/golf season  Was able to golf through the summer  Pain recently worsening, more severe, more frequent  Feels he is limping, walking tolerance limited  Has difficulty putting shoes and socks on  Now scheduled for R MAHENDRA on 22, is here today for preoperative visit  Pain  Current pain ratin  At worst pain ratin     Social Support     Employment status: working (nothing  physical, hip pain does not interrupt work tasks), will be retiring within a few months        Patient Goals    Patient goal: learn the best exercises to do leading up to surgery           Objective      Active Range of Motion   Left Hip   Flexion: 90 degrees with R hip pain  Extension: 15 degrees with pain  Abduction: 30 degrees with pain  External rotation (90/90): 15 degrees with pain  Internal rotation (90/90): 20 degrees with R hip pain     Right Hip   Flexion: 80 degrees with pain  Extension: 10 degrees with pain  Abduction: 25 degrees with pain  External rotation (90/90): 20 degrees with pain  Internal rotation (90/90): 0 degrees with pain     Passive Range of Motion   Left Hip   Flexion: 100 degrees with right hip pain  Internal rotation (90/90): 10 degrees with right hip pain     Right Hip   Flexion: 80 degrees with pain  Internal rotation (90/90): 0 degrees with pain  External rotation (90/90): 20 degrees with pain     Strength/Myotome Testing         Right Hip   Planes of Motion   Flexion: 3+ (pain)  Abduction: 3 + (pain)     Tests           Right Hip   Jethro: Positive       Ambulation   Weight-Bearing Status   Weight-Bearing Status (Left): full weight bearing   Weight-Bearing Status (Right): full weight-bearing       Observational Gait   Gait: antalgic   Decreased right stance time       Functional Assessment           Precautions: PMH of DVT and PE      Manuals 11/08/22                                                                Neuro Re-Ed                                                                                                        Ther Ex                                                                                                        Review HEP, add pillow squeeze into bridge NV            Ther Activity                                       Gait Training                                       Modalities             YANG, Cryo prn             IE/HEP RG

## 2022-11-08 NOTE — PROGRESS NOTES
Physical Therapy Evaluation     Today's date: 2022  Patient name: Eris Quinteros  : 1960  MRN: [id number]  Referring provider: Susy Guy MD  Dx:   Encounter Diagnosis     ICD-10-CM    1  Primary osteoarthritis of right hip  M16 11               Assessment  Assessment details: Patient is a 64 y o  male who  presents with pain, range of motion loss, weakness, proprioceptive with a diagnosis of severe R hip OA  He has functional limitations as a result of impairments  He has a total hip replacement scheduled for 22  Today provided the following services to patient in addition to PT evaluation  Gait Training with wheeled walker  Reviewed current and  immediate post-operative home exercise program  Virtual Home Assessment  Home Preparation Checklist was reviewed with patient including identification of care partner and encouragement of single level set-up  Post-operative pain management expectations  Discuss DOS and answered patient's questions      Understanding of Dx/Px/POC: excellent  Goals  ST: Pt to verbalize understanding of HEP and home preparation awaiting R  MAHENDRA (within 72 hours)  LT: Pt to help maximize post op recovery throuhg use of pre-op ROM, flexibility, and strenthening exercises  - will follow up post op  Plan  One more prehab visit, surgery 22, then will follow up for first post op visit the following 22  Subjective Evaluation     History of Present Illness  Mechanism of injury: Patient is a 64 y o  old male who presents for an initial outpatient physical therapy consultation regarding his R hip pain  About one year history of bilateral hip pain, R worse than L    X-rays (+) for OA, Did one visit of physical therapy in 2022  To learn some exercises to  try to get some pain relief through the summer/golf season  Was able to golf through the summer  Pain recently worsening, more severe, more frequent        Feels he is limping, walking tolerance limited  Has difficulty putting shoes and socks on  Now scheduled for R MAHENDRA on 22, is here today for preoperative visit  Pain  Current pain ratin  At worst pain ratin     Social Support     Employment status: working (nothing  physical, hip pain does not interrupt work tasks), will be retiring within a few months        Patient Goals    Patient goal: learn the best exercises to do leading up to surgery           Objective      Active Range of Motion   Left Hip   Flexion: 90 degrees with R hip pain  Extension: 15 degrees with pain  Abduction: 30 degrees with pain  External rotation (90/90): 15 degrees with pain  Internal rotation (90/90): 20 degrees with R hip pain     Right Hip   Flexion: 80 degrees with pain  Extension: 10 degrees with pain  Abduction: 25 degrees with pain  External rotation (90/90): 20 degrees with pain  Internal rotation (90/90): 0 degrees with pain     Passive Range of Motion   Left Hip   Flexion: 100 degrees with right hip pain  Internal rotation (90/90): 10 degrees with right hip pain     Right Hip   Flexion: 80 degrees with pain  Internal rotation (90/90): 0 degrees with pain  External rotation (90/90): 20 degrees with pain     Strength/Myotome Testing         Right Hip   Planes of Motion   Flexion: 3+ (pain)  Abduction: 3 + (pain)     Tests           Right Hip   Jethro: Positive       Ambulation   Weight-Bearing Status   Weight-Bearing Status (Left): full weight bearing   Weight-Bearing Status (Right): full weight-bearing       Observational Gait   Gait: antalgic   Decreased right stance time       Functional Assessment           Precautions: PMH of DVT and PE      Manuals 22                                                                Neuro Re-Ed                                                                                                        Ther Ex Review HEP, add pillow squeeze into bridge NV            Ther Activity                                       Gait Training                                       Modalities             MH, Cryo prn             IE/HEP RG

## 2022-11-10 ENCOUNTER — OFFICE VISIT (OUTPATIENT)
Dept: PHYSICAL THERAPY | Facility: CLINIC | Age: 62
End: 2022-11-10

## 2022-11-10 DIAGNOSIS — M16.11 PRIMARY OSTEOARTHRITIS OF RIGHT HIP: Primary | ICD-10-CM

## 2022-11-10 NOTE — PROGRESS NOTES
Daily Note     Today's date: 11/10/2022  Patient name: Nidhi Quinn  : 1960  MRN: [id number]  Referring provider: Jaqueline Chris MD  Dx:   Encounter Diagnosis     ICD-10-CM    1  Primary osteoarthritis of right hip  M16 11        Start Time: 1001  Stop Time: 1031  Total time in clinic (min): 30 minutes    Subjective: Jose Braxton has been doing his HEP  No new c/o, he is scheduled for R MAHENDRA tomorrow  Objective: See treatment diary below  Reviewed HEP, did some manual stretching to try to maximize pre op ROM  Assessment: Tolerated treatment well  Goals  ST: Pt to verbalize understanding of HEP and home preparation awaiting R  MAHENDRA -met    Plan:  Will follow up post operatively (surgery 22)     Precautions: PMH of DVT and PE       Manuals 22  11/10                    PROM R hip (all)   RG                    Manual R quad stretch   RG                                                                   Neuro Re-Ed                                                                                                                                                                                               Ther Ex                        Quad sets    5sec x 20                   Glute sets    5sec x 20                    Bridge with adductor squeeze    3sec  2 x 10                    Clamshells    3sec  2 x 10                    Hooklying ABD    Purple  5sec x 20                                                                   Review HEP, add pillow squeeze into bridge NV                     Ther Activity                                                                       Gait Training                                                                       Modalities                       MH, Cryo prn                       IE/HEP RG

## 2022-11-14 ENCOUNTER — EVALUATION (OUTPATIENT)
Dept: PHYSICAL THERAPY | Facility: CLINIC | Age: 62
End: 2022-11-14

## 2022-11-14 DIAGNOSIS — Z96.641 HISTORY OF TOTAL HIP REPLACEMENT, RIGHT: ICD-10-CM

## 2022-11-14 DIAGNOSIS — M16.11 PRIMARY OSTEOARTHRITIS OF RIGHT HIP: Primary | ICD-10-CM

## 2022-11-14 NOTE — PROGRESS NOTES
Reevaluation    Today's date: 2022  Patient name: Akira Alcaraz  : 1960  MRN: [id number]  Referring provider: Oh Shah MD  Dx:   Encounter Diagnosis     ICD-10-CM    1  Primary osteoarthritis of right hip  M16 11    2  History of total hip replacement, right  [id number]                    Assessment  Assessment details: Patient is a 64 y o  male who  presents with pain, range of motion loss, weakness, proprioceptive loss s/p R total hip replacement 22  Has functional limitations as a result of impairments  He would benefit from course of skilled physical therapy to address above listed impairments in an effort to improve function  Understanding of Dx/Px/POC: excellent    Short Term Goals:  1) Pain : Decrease  hip pain to 2/10 at worst x 1 continuous week within 3-4weeks  2) AROM: Improve hip AROM by at least 10 degrees for flexion,  ER within 3-4 weeks  3) Strength: Improved LE strength by at least 1/2 grade for all noted as weak within 3-4 weeks  4) Function: Improved FOTO score from IE within 3-4 weeks (23@ IE), patient to note greater ease of ambulation subjectively, patient to transition to use of cane from walker for community ambulation within 3 weeks  LongTerm Goals:  1) Pain : Eliminate  hip  pain  x 1 continuous week within 8 weeks  2) AROM:Improve  hip AROM to WNL within the confines of any post-op precuations  3) Strength: Improved LE strength to 5/5 within 8 weeks  4) Function: Improved FOTO score to at least 55 ; discharge AD for community ambulation, no reported difficulty with ADLs as they pertain to hip within 8 weeks  5) (I) with HEP within 8 weeks            Plan  Rx to include stretching, strengthening, manual therapy, modalities for pain and inflammation control, HEP  2-3x's per week x 6-8 weeks      Subjective Evaluation     History of Present Illness  Mechanism of injury: Patient is a 64 y o  old male who presents for an initial outpatient physical therapy consultation regarding his R hip pain  About one year history of bilateral hip pain, R worse than L  Underwent a R  MAHENDRA on 22  Post op c/o pain, difficulty transferring, difficulty walking  He is having sleep interruption associated with pain  Pain  Current pain ratin  At worst pain rating: 10     Social Support     Employment status: working (nothing  physical, hip pain does not interrupt work tasks), will be retiring within a few months        Patient Goals  Decrease pain, improve motion and ability to walk, return to golf        Objective     No drainage noted from surgical incision site R posterior hip, tegaderm dressing intact      Active Range of Motion   Right Hip   Flexion:60 degrees with R hip pain  Extension: -5 degrees with pain  Abduction: 20 degrees with pain  External rotation (90/90): 5 degrees with pain       Passive Range of Motion  Right  Hip   Flexion: 80 degrees with pain  Extension: 0 degres with pain  Abduction: 20 degrees with pain  External rotation (90/90): 5 degrees with pain        Strength/Myotome Testing       Right Hip   Planes of Motion   Flexion: 2 (pain)  Abduction: 2 (pain)  Extension : 2(pain    Right Knee  Flexion: 3+/5  Extension 3/5        Ambulation   Weight-Bearing Status   Weight-Bearing Status (Left): full weight bearing   Weight-Bearing Status (Right): WBAT     Observational Gait   Gait: Wheeled walker  Needed cueing to get R heel during initial contact      Decreased right stance time       Functional Assessment           Precautions: PMH of DVT and PE, posterior hip precautions          Manuals 22                     Gentle PROM R hip within precautions( FF, ABD, ER)  RG                                                                                          Neuro Re-Ed                                                                                                                                                                                               Ther Ex                        Quad sets  10sec x 20 in sitting                    Glute sets  NV                     Bridge   3sec  2 x 10                    Hooklying ADD 5sec  2 x 10                     Hooklying ABD  Pink  5sec  2 x 10                     LAQ  3sec  2 x 10                      SAQ  3sec  2 x 10                     Tband HS curl Pink  2 x 10                     Ther Activity                                                                       Gait Training                                                                       Modalities                       Cryo prn  10 min                     RVL RG

## 2022-11-14 NOTE — LETTER
November 15, 1095    MD Adelso Logan [address] [facility]    Patient: Bela Danielle   YOB: 1960   Date of Visit: 2022     Encounter Diagnosis     ICD-10-CM    1  Primary osteoarthritis of right hip  M16 11    2  History of total hip replacement, right  E96 005        Dear Dr Octavio Laurent: Thank you for your recent referral of [facility]  Please review the attached evaluation summary from Jethro's recent visit  Please verify that you agree with the plan of care by signing the attached order  If you have any questions or concerns, please do not hesitate to call  I sincerely appreciate the opportunity to share in the care of one of your patients and hope to have another opportunity to work with you in the near future  Sincerely,    Ai Hall PT      Referring Provider:      I certify that I have read the below Plan of Care and certify the need for these services furnished under this plan of treatment while under my care  MD Adelso Logan 2 [id number]  Via Fax: [phone]             Reevaluation    Today's date: 2022  Patient name: Bela Danielle  : 1960  MRN: [id number]  Referring provider: Emily Ruvalcaba MD  Dx:   Encounter Diagnosis     ICD-10-CM    1  Primary osteoarthritis of right hip  M16 11    2  History of total hip replacement, right  Z96 641                    Assessment  Assessment details: Patient is a 64 y o  male who  presents with pain, range of motion loss, weakness, proprioceptive loss s/p R total hip replacement 22  Has functional limitations as a result of impairments  He would benefit from course of skilled physical therapy to address above listed impairments in an effort to improve function      Understanding of Dx/Px/POC: excellent    Short Term Goals:  1) Pain : Decrease  hip pain to 2/10 at worst x 1 continuous week within 3-4weeks  2) AROM: Improve hip AROM by at least 10 degrees for flexion,  ER within 3-4 weeks  3) Strength: Improved LE strength by at least 1/2 grade for all noted as weak within 3-4 weeks  4) Function: Improved FOTO score from IE within 3-4 weeks (23@ IE), patient to note greater ease of ambulation subjectively, patient to transition to use of cane from walker for community ambulation within 3 weeks  LongTerm Goals:  1) Pain : Eliminate  hip  pain  x 1 continuous week within 8 weeks  2) AROM:Improve  hip AROM to WNL within the confines of any post-op precuations  3) Strength: Improved LE strength to 5/5 within 8 weeks  4) Function: Improved FOTO score to at least 55 ; discharge AD for community ambulation, no reported difficulty with ADLs as they pertain to hip within 8 weeks  5) (I) with HEP within 8 weeks            Plan  Rx to include stretching, strengthening, manual therapy, modalities for pain and inflammation control, HEP  2-3x's per week x 6-8 weeks  Subjective Evaluation     History of Present Illness  Mechanism of injury: Patient is a 64 y o  old male who presents for an initial outpatient physical therapy consultation regarding his R hip pain  About one year history of bilateral hip pain, R worse than L  Underwent a R  MAHENDRA on 22  Post op c/o pain, difficulty transferring, difficulty walking  He is having sleep interruption associated with pain        Pain  Current pain ratin  At worst pain rating: 10     Social Support     Employment status: working (nothing  physical, hip pain does not interrupt work tasks), will be retiring within a few months        Patient Goals  Decrease pain, improve motion and ability to walk, return to golf        Objective     No drainage noted from surgical incision site R posterior hip, tegaderm dressing intact      Active Range of Motion   Right Hip   Flexion:60 degrees with R hip pain  Extension: -5 degrees with pain  Abduction: 20 degrees with pain  External rotation (90/90): 5 degrees with pain       Passive Range of Motion  Right  Hip   Flexion: 80 degrees with pain  Extension: 0 degres with pain  Abduction: 20 degrees with pain  External rotation (90/90): 5 degrees with pain        Strength/Myotome Testing       Right Hip   Planes of Motion   Flexion: 2 (pain)  Abduction: 2 (pain)  Extension : 2(pain    Right Knee  Flexion: 3+/5  Extension 3/5        Ambulation   Weight-Bearing Status   Weight-Bearing Status (Left): full weight bearing   Weight-Bearing Status (Right): WBAT     Observational Gait   Gait: Wheeled walker  Needed cueing to get R heel during initial contact      Decreased right stance time       Functional Assessment           Precautions: PMH of DVT and PE, posterior hip precautions          Manuals 11/14/22                     Gentle PROM R hip within precautions( FF, ABD, ER)  RG                                                                                          Neuro Re-Ed                                                                                                                                                                                               Ther Ex                        Quad sets  10sec x 20 in sitting                    Glute sets  NV                     Bridge   3sec  2 x 10                    Hooklying ADD 5sec  2 x 10                     Hooklying ABD  Pink  5sec  2 x 10                     LAQ  3sec  2 x 10                      SAQ  3sec  2 x 10                     Tband HS curl Pink  2 x 10                     Ther Activity                                                                       Gait Training                                                                       Modalities                       Cryo prn  10 min                     RVL RG

## 2022-11-17 ENCOUNTER — OFFICE VISIT (OUTPATIENT)
Dept: PHYSICAL THERAPY | Facility: CLINIC | Age: 62
End: 2022-11-17

## 2022-11-17 DIAGNOSIS — Z96.641 HISTORY OF TOTAL HIP REPLACEMENT, RIGHT: ICD-10-CM

## 2022-11-17 DIAGNOSIS — M16.11 PRIMARY OSTEOARTHRITIS OF RIGHT HIP: Primary | ICD-10-CM

## 2022-11-17 NOTE — PROGRESS NOTES
Daily Note     Today's date: 2022  Patient name: Yeni Cho  : 1960  MRN: [id number]  Referring provider: Larisa Branham MD  Dx:   Encounter Diagnosis     ICD-10-CM    1  Primary osteoarthritis of right hip  M16 11       2  History of total hip replacement, right  Z96 641              Stop Time: 1503       Subjective: Feeling a little better as compared to lat visit  Getting around better  Using Tylenol vs oxycodone  Objective: See treatment diary below  Progressed LE strengthening program     Has trouble keeping R leg straight down on treatment table because of hip flexor tightness and pain  Advised him in gentle self stretching of hip flexors for home  Assessment:  Getting along well for 6 days postop  He will be more comfortable as hip flexor tightness improves and he heals more internally  Patient would benefit from continued course of skilled physical therapy to address impairments in an effort to improve function          Plan: Continue per plan of care                    Precautions: PMH of DVT and PE, posterior hip precautions          Manuals 22                    Gentle PROM R hip within precautions( FF, ABD, ER)  RG RG                                                                                         Neuro Re-Ed                                                                                                                                                                                               Ther Ex                        Quad sets  10sec x 20 in sitting 5sec  x10 in supine   x10 sitting                   Glute sets  NV                     Bridge   3sec  2 x 10 With ball sq  3se  2 x 10                   Hooklying ADD 5sec  2 x 10                     Hooklying ABD  [place]  5sec  2 x 10 Pink  5sec  2x 10                    LAQ  3sec  2 x 10 3sec  2 x 15                    SAQ  3sec  2 x 10  3sec  2 x 15                   Tband HS curl Pink  2 x 10  Pink  3 x 10                     Calf Raises at rail  2 x 10           Mini squats at rail  2 x 10           Standing R hip  Flexor streth: L leg on 6: step  10sec x10           Ther Activity                                                                       Gait Training                                                                       Modalities                       Cryo prn  10 min  10 min, sitting                   RVL RG

## 2022-11-21 ENCOUNTER — OFFICE VISIT (OUTPATIENT)
Dept: PHYSICAL THERAPY | Facility: CLINIC | Age: 62
End: 2022-11-21

## 2022-11-21 DIAGNOSIS — Z96.641 HISTORY OF TOTAL HIP REPLACEMENT, RIGHT: ICD-10-CM

## 2022-11-21 DIAGNOSIS — M16.11 PRIMARY OSTEOARTHRITIS OF RIGHT HIP: Primary | ICD-10-CM

## 2022-11-21 NOTE — PROGRESS NOTES
Daily Note     Today's date: 2022  Patient name: Sheron Martini  : 1960  MRN: [id number]  Referring provider: Shani Mcclellan MD  Dx:   Encounter Diagnosis     ICD-10-CM    1  Primary osteoarthritis of right hip  M16 11       2  History of total hip replacement, right  Z96 641           Start Time: 1210  Stop Time: 1327  Total time in clinic (min): 77 minutes    Subjective: Could not get comfortable last night for sleep  Sat a few hours working at computer this morning  Objective: See treatment diary below  Progressed LE strengthening program   Able to keep R leg straight on treatment table x 3 min today  Assessment:  Hip flexor flexibility improving  Still with unresolved pain, ROM loss, weakness negatively effecting quality of life and function  Patient would benefit from continued course of skilled physical therapy to address impairments in an effort to improve function          Plan: Continue per plan of care                    Precautions: PMH of DVT and PE, posterior hip precautions          Manuals 22                  Gentle PROM R hip within precautions( FF, ABD, ER)  RG RG  RG                                                                                       Neuro Re-Ed                                                                                                                                                                                               Ther Ex                        Quad sets  10sec x 20 in sitting 5sec  x10 in supine   x10 sitting  10sec x 10 in supine x 10 in sitting                 Glute sets  NV                     Bridge   3sec  2 x 10 With ball sq  3se  2 x 10  With     Ball sq  3sec  2 x 10                 Hooklying ADD 5sec  2 x 10                     Hooklying ABD  [place]  5sec  2 x 10 Pink  5sec  2x 10  Pink  5sec  2 x 15                  LAQ  3sec  2 x 10 3sec  2 x 15  3sec  2 x 15                  SAQ  3sec  2 x 10  3sec  2 x 15  1#  3sec  2 x 15                 Tband HS curl Pink  2 x 10  Pink  3 x 10    Green  2 x 15                 Calf Raises at rail  2 x 10 2 x 15          Mini squats at rail  2 x 10 2 x 15          Step up L   4"  X 10          Standing R hip  Flexor streth: L leg on 6: step  10sec x10           Keep leg straight on table    3min          Ther Activity                                                                       Gait Training                                                                       Modalities                       Cryo prn  10 min  10 min, sitting 10 min sitting                 RVL RG

## 2022-11-23 ENCOUNTER — OFFICE VISIT (OUTPATIENT)
Dept: PHYSICAL THERAPY | Facility: CLINIC | Age: 62
End: 2022-11-23

## 2022-11-23 DIAGNOSIS — M16.11 PRIMARY OSTEOARTHRITIS OF RIGHT HIP: Primary | ICD-10-CM

## 2022-11-23 DIAGNOSIS — Z96.641 HISTORY OF TOTAL HIP REPLACEMENT, RIGHT: ICD-10-CM

## 2022-11-23 NOTE — PROGRESS NOTES
Daily Note     Today's date: 2022  Patient name: Alfredo Humphreys  : 1960  MRN: [id number]  Referring provider: Dottie Ahmadi MD  Dx:   Encounter Diagnosis     ICD-10-CM    1  Primary osteoarthritis of right hip  M16 11       2  History of total hip replacement, right  Z96 641                      Subjective: Spent about an hour in bed last night just to see how it would feel  Noting less pain, better tolerance to keeping R leg straight  Objective: See treatment diary below  Progressed LE strengthening program       Progressed to use of single point cane for ambulation in clinic  Assessment:  Hip flexor flexibility improved, leading to better tolerance to supine positioning  Looked steady with use of cane  Still with unresolved pain, ROM loss, weakness negatively effecting quality of life and function  Patient would benefit from continued course of skilled physical therapy to address impairments in an effort to improve function          Plan: Continue per plan of care                    Precautions: PMH of DVT and PE, posterior hip precautions          Manuals 22                Gentle PROM R hip within precautions( FF, ABD, ER)  RG RG  RG  RG                                                                                     Neuro Re-Ed                                                                                                                                                                                               Ther Ex                        Quad sets  10sec x 20 in sitting 5sec  x10 in supine   x10 sitting  10sec x 10 in supine x 10 in sitting  10sec   2 x 10 supine               Glute sets  NV                     Bridge   3sec  2 x 10 With ball sq  3se  2 x 10  With     Ball sq  3sec  2 x 10  with ball sq    2 x 10               Hooklying ADD 5sec  2 x 10                     Hooklying ABD  [place]  5sec  2 x 10 Pink  5sec  2x 10  Pink  5sec  2 x 15  Pink  5sec  2 x 15                LAQ  3sec  2 x 10 3sec  2 x 15  3sec  2 x 15  1#  2 x 10                SAQ  3sec  2 x 10  3sec  2 x 15  1#  3sec  2 x 15  1#  3sec2 xz1 5               Tband HS curl Pink  2 x 10  Pink  3 x 10    Green  2 x 15  Green  2 x 15               Calf Raises at rail  2 x 10 2 x 15 2 x 15         Mini squats at rail  2 x 10 2 x 15 2 x 15         Step up L   4"  X 10 4"  2 x 10         Standing R hip  Flexor streth: L leg on 6: step  10sec x10           Keep leg straight on table    3min          Ther Activity                                                                       Gait Training                        SPC, 2 point gait        25' x 4                                       Modalities                       Cryo prn  10 min  10 min, sitting 10 min sitting  10 min, sitting               RVL RG

## 2022-11-29 ENCOUNTER — OFFICE VISIT (OUTPATIENT)
Dept: PHYSICAL THERAPY | Facility: CLINIC | Age: 62
End: 2022-11-29

## 2022-11-29 DIAGNOSIS — M16.11 PRIMARY OSTEOARTHRITIS OF RIGHT HIP: Primary | ICD-10-CM

## 2022-11-29 DIAGNOSIS — Z96.641 HISTORY OF TOTAL HIP REPLACEMENT, RIGHT: ICD-10-CM

## 2022-11-29 NOTE — PROGRESS NOTES
Daily Note     Today's date: 2022  Patient name: Nidhi Quinn  : 1960  MRN: [id number]  Referring provider: Jaqueline Chris MD  Dx:   Encounter Diagnosis     ICD-10-CM    1  Primary osteoarthritis of right hip  M16 11       2  History of total hip replacement, right  Z96 641              Stop Time: 1115       Subjective: Was feeling better prior to straining leg lifting into bed last night  Having some anterolateral pain  Still feels he is walking better  Objective: See treatment diary below  Progressed LE strengthening program     Passive Range of Motion  Right  Hip   Flexion: 90 degrees  Extension: 10 degres with pain  Abduction: 30 degrees with pain  External rotation (90/90): 15 degrees with pain    Assessment:  Tolerated session well  Nothing concerning on exam today despite report of strain as noted subjectively  ROM improving  Still with unresolved pain, ROM loss, weakness negatively effecting quality of life and function  Patient would benefit from continued course of skilled physical therapy to address impairments in an effort to improve function          Plan: Continue per plan of care                    Precautions: PMH of DVT and PE, posterior hip precautions          Manuals 22              Gentle PROM R hip within precautions( FF, ABD, ER)  RG RG  RG  RG  RG                                                                                   Neuro Re-Ed                                                                                                                                                                                               Ther Ex                        Quad sets  10sec x 20 in sitting 5sec  x10 in supine   x10 sitting  10sec x 10 in supine x 10 in sitting  10sec   2 x 10 supine  10sec  2 x 10 supine             Glute sets  NV                     Bridge   3sec  2 x 10 With ball sq  3se  2 x 10  With     Ball sq  3sec  2 x 10  with ball sq    2 x 10  with ball sq  3sec  2  x15             Hooklying ADD 5sec  2 x 10                     Hooklying ABD  [place]  5sec  2 x 10 Pink  5sec  2x 10  Pink  5sec  2 x 15  Pink  5sec  2 x 15  Pink  5sec  2 x 15              LAQ  3sec  2 x 10 3sec  2 x 15  3sec  2 x 15  1#  2 x 10  1#  2 x 15              SAQ  3sec  2 x 10  3sec  2 x 15  1#  3sec  2 x 15  1#  3sec2 xz1 5  1#  3sec  2 x 15             Tband HS curl Pink  2 x 10  Pink  3 x 10    Green  2 x 15  Green  2 x 15  Green  2 x 10             Calf Raises at rail  2 x 10 2 x 15 2 x 15 2 x 15        Mini squats at rail  2 x 10 2 x 15 2 x 15 2 x 15        Step up L   4"  X 10 4"  2 x 10 4"2 x 10        Standing R hip  Flexor streth: L leg on 6: step  10sec x10           Keep leg straight on table    3min          Supine hip abduction     PT assist  2 x 10           Heel slides     3 x 10        Upright bike     NV        Ther Activity                                                                       Gait Training                        SPC, 2 point gait        25' x 4                                       Modalities                       Cryo prn  10 min  10 min, sitting 10 min sitting  10 min, sitting  10 min, sitting             RVL RG

## 2022-12-02 ENCOUNTER — OFFICE VISIT (OUTPATIENT)
Dept: PHYSICAL THERAPY | Facility: CLINIC | Age: 62
End: 2022-12-02

## 2022-12-02 DIAGNOSIS — Z96.641 HISTORY OF TOTAL HIP REPLACEMENT, RIGHT: Primary | ICD-10-CM

## 2022-12-02 DIAGNOSIS — M16.11 PRIMARY OSTEOARTHRITIS OF RIGHT HIP: ICD-10-CM

## 2022-12-02 NOTE — PROGRESS NOTES
Daily Note     Today's date: 2022  Patient name: Donaldo Cruz  : 1960  MRN: [id number]  Referring provider: Paul Leon MD  Dx:   Encounter Diagnosis     ICD-10-CM    1  History of total hip replacement, right  Z96 641       2  Primary osteoarthritis of right hip  M16 11                      Subjective: Followed up with surgeons office yesterday  X-rays looked good  Felt a strain walking down hill to car after MD visit  Still hacing a hard time getting comfortable for sleep  Objective: See treatment diary below  Progressed LE strengthening program     Initiated stationary biking  Assessment:  Tolerated progressions well  Functional quad weakness evident with step ups/reverese step down   Still with unresolved pain, ROM loss, weakness negatively effecting quality of life and function  Patient would benefit from continued course of skilled physical therapy to address impairments in an effort to improve function          Plan: Continue per plan of care                    Precautions: PMH of DVT and PE, posterior hip precautions          Manuals 22            Gentle PROM R hip within precautions( FF, ABD, ER)  RG RG  RG  RG  RG  RG                                                                                 Neuro Re-Ed                                                                                                                                                                                               Ther Ex                        Quad sets  10sec x 20 in sitting 5sec  x10 in supine   x10 sitting  10sec x 10 in supine x 10 in sitting  10sec   2 x 10 supine  10sec  2 x 10 supine  10sec  2 x 10 supine           Glute sets  NV                     Bridge   3sec  2 x 10 With ball sq  3se  2 x 10  With     Ball sq  3sec  2 x 10  with ball sq    2 x 10  with ball sq  3sec  2  x15  3sec  2 x 15           Hooklying ADD 5sec  2 x 10                   Hooklying ABD  [place]  5sec  2 x 10 Pink  5sec  2x 10  Pink  5sec  2 x 15  Pink  5sec  2 x 15  Pink  5sec  2 x 15  Green  2 x 10  3sec            LAQ  3sec  2 x 10 3sec  2 x 15  3sec  2 x 15  1#  2 x 10  1#  2 x 15  1#  2 x 15            SAQ  3sec  2 x 10  3sec  2 x 15  1#  3sec  2 x 15  1#  3sec2 xz1 5  1#  3sec  2 x 15  1#  3ec  2 x 15           Tband HS curl Pink  2 x 10  Pink  3 x 10    Green  2 x 15  Green  2 x 15  Green  2 x 10  Green  2 x 10           Calf Raises at rail  2 x 10 2 x 15 2 x 15 2 x 15 2 x 15       Mini squats at rail  2 x 10 2 x 15 2 x 15 2 x 15 2 x 15       Step up L   4"  X 10 4"  2 x 10 4"2 x 10 4"  2 x 10       Standing R hip  Flexor streth: L leg on 6: step  10sec x10           Keep leg straight on table    3min          Supine hip abduction     PT assist  2 x 10    No assist  2 x 10       Heel slides     3 x 10 X 10       Upright bike, st 10     NV 7 5 min       Leg press st 12      55#  3 x 12       March with SCP      33' x 2       Ther Activity                                                                       Gait Training                        SPC, 2 point gait        25' x 4                                       Modalities                       Cryo prn  10 min  10 min, sitting 10 min sitting  10 min, sitting  10 min, sitting  10 min sitting           RVL RG

## 2022-12-06 ENCOUNTER — OFFICE VISIT (OUTPATIENT)
Dept: PHYSICAL THERAPY | Facility: CLINIC | Age: 62
End: 2022-12-06

## 2022-12-06 DIAGNOSIS — M16.11 PRIMARY OSTEOARTHRITIS OF RIGHT HIP: Primary | ICD-10-CM

## 2022-12-06 DIAGNOSIS — Z96.641 HISTORY OF TOTAL HIP REPLACEMENT, RIGHT: ICD-10-CM

## 2022-12-06 NOTE — PROGRESS NOTES
Daily Note     Today's date: 2022  Patient name: Donnie Currie  : 1960  MRN: [id number]  Referring provider: Lisbet Aparicio MD  Dx:   Encounter Diagnosis     ICD-10-CM    1  Primary osteoarthritis of right hip  M16 11       2  History of total hip replacement, right  Z96 641                      Subjective: Had more pain described as hip flexor type pain yesterday, not as bad today  Feels he is almost ready to get rid of cane  Objective: See treatment diary below  Progressed LE strengthening program     (+) R lateral trunk lean when ambulating without cane indicative of functional hip abductor weakness  Assessment:  3 weeks, 4 days post op  Did well with exercise  Not yet ready to discharge cane for community ambulation  Still with unresolved pain, ROM loss, weakness negatively effecting quality of life and function  Patient would benefit from continued course of skilled physical therapy to address impairments in an effort to improve function          Plan: Continue per plan of care                    Precautions: PMH of DVT and PE, posterior hip precautions          Manuals 22          Gentle PROM R hip within precautions( FF, ABD, ER)  RG RG  RG  RG  RG  RG  RG                                                                               Neuro Re-Ed                                                                                                                                                                                               Ther Ex                        Quad sets  10sec x 20 in sitting 5sec  x10 in supine   x10 sitting  10sec x 10 in supine x 10 in sitting  10sec   2 x 10 supine  10sec  2 x 10 supine  10sec  2 x 10 supine  10sec x 10         Glute sets  NV                     Bridge   3sec  2 x 10 With ball sq  3se  2 x 10  With     Ball sq  3sec  2 x 10  with ball sq    2 x 10  with ball sq  3sec  2  x15  3sec  2 x 15  3sec  2 x 15         Hooklying ADD 5sec  2 x 10                     Hooklying ABD  [place]  5sec  2 x 10 Pink  5sec  2x 10  Pink  5sec  2 x 15  Pink  5sec  2 x 15  Pink  5sec  2 x 15  Green  2 x 10  3sec  Green  2 x 10          LAQ  3sec  2 x 10 3sec  2 x 15  3sec  2 x 15  1#  2 x 10  1#  2 x 15  1#  2 x 15  1#  2 x 15          SAQ  3sec  2 x 10  3sec  2 x 15  1#  3sec  2 x 15  1#  3sec2 xz1 5  1#  3sec  2 x 15  1#  3ec  2 x 15  1#  2 x 15         Tband HS curl Pink  2 x 10  Pink  3 x 10    Green  2 x 15  Green  2 x 15  Green  2 x 10  Green  2 x 10  Green  2 1 5         Calf Raises at rail  2 x 10 2 x 15 2 x 15 2 x 15 2 x 15 2 x 15      Mini squats at rail  2 x 10 2 x 15 2 x 15 2 x 15 2 x 15 2 x 15      Step up L   4"  X 10 4"  2 x 10 4"2 x 10 4"  2 x 10 4"  2 x 10 R     Step up and over       X 10      Standing R hip  Flexor streth: L leg on 6: step  10sec x10           Keep leg straight on table    3min          Supine hip abduction     PT assist  2 x 10    No assist  2 x 10 No assist  2 x 10      Heel slides     3 x 10 X 10       Upright bike, st 10     NV 7 5 min 8min      Leg press st 12      55#  3 x 12 55#  3 x 10      March with SCP      33' x 2 33' x 4      Ther Activity                                                                      Gait Training                        SPC, 2 point gait        25' x 4                                       Modalities                       Cryo prn  10 min  10 min, sitting 10 min sitting  10 min, sitting  10 min, sitting  10 min sitting  5 min sitting         RVL RG

## 2022-12-08 ENCOUNTER — OFFICE VISIT (OUTPATIENT)
Dept: PHYSICAL THERAPY | Facility: CLINIC | Age: 62
End: 2022-12-08

## 2022-12-08 DIAGNOSIS — M16.11 PRIMARY OSTEOARTHRITIS OF RIGHT HIP: ICD-10-CM

## 2022-12-08 DIAGNOSIS — Z96.641 HISTORY OF TOTAL HIP REPLACEMENT, RIGHT: ICD-10-CM

## 2022-12-08 DIAGNOSIS — S16.1XXD ACUTE STRAIN OF NECK MUSCLE, SUBSEQUENT ENCOUNTER: Primary | ICD-10-CM

## 2022-12-12 ENCOUNTER — OFFICE VISIT (OUTPATIENT)
Dept: PHYSICAL THERAPY | Facility: CLINIC | Age: 62
End: 2022-12-12

## 2022-12-12 DIAGNOSIS — S16.1XXD ACUTE STRAIN OF NECK MUSCLE, SUBSEQUENT ENCOUNTER: ICD-10-CM

## 2022-12-12 DIAGNOSIS — Z96.641 HISTORY OF TOTAL HIP REPLACEMENT, RIGHT: ICD-10-CM

## 2022-12-12 DIAGNOSIS — M16.11 PRIMARY OSTEOARTHRITIS OF RIGHT HIP: Primary | ICD-10-CM

## 2022-12-12 NOTE — PROGRESS NOTES
Daily Note    Today's date: 2022  Patient name: Katarzyna Alvarez  : 1960  MRN: [id number]  Referring provider: Geo Orosco MD/Nikita Cardoso PT  Dx:   Encounter Diagnosis     ICD-10-CM    1  Primary osteoarthritis of right hip  M16 11       2  History of total hip replacement, right  Z96 641       3  Acute strain of neck muscle, subsequent encounter  S16  1XXD           Subjective: Patient tells me this neck is feeling better  Did have some L sided stiffness this AM which woke him up and is persistent until this time, not as bad as last week  R hip pain also improving  He is mostly ambulating around the house without AD  Objective:  Rx as per treatment diary below  Progressed functional strengthening program   Assessment: Tolerated treatment well  Functional limitations remain as follows:  Hip: Decreased standing/walking tolerance, dysfunctional gait, sleep interruption associated with pain  Neck: Difficulty looking side to side for ADLs, sleep interruption associated with pain  Patient would benefit from continued course of skilled physical therapy to address impairments in an effort to improve function        Plan: Continue as per plan of care         Precautions: PMH of DVT and PE, posterior hip precautions          Manuals 22               Gentle PROM R hip within precautions( FF, ABD, ER)  RG                               cervical sideglides  RG                     Manual L upper trap stretch  RG                     Neuro Re-Ed                                                                                                                                                                                               Ther Ex                        Quad sets  NP                Bridge with ball sq  3sec  2 x 15               Hooklying ABD  Green  5sec  2 x 15               LAQ  1 5#  3sec  2 x 15               SAQ  1 5#  3sec  2 x 10              Tband HS curl Green  2 x 10              Step up R 6"  2 x 10            Step up and over 4" x 10            Supine hip abduction 3 x 10            Upright bike, st 10 10 min  L2            Leg press st 12 55#  3 x 12            March with SCP NV            Clamshells 1 x 10                         Cervical SNAG             submax cervical L SB isometrics                          Ther Activity                                                                      Gait Training                                                                  Modalities                       Cryo prn  10 min in sitting to R hip and C/S

## 2022-12-13 ENCOUNTER — APPOINTMENT (OUTPATIENT)
Dept: PHYSICAL THERAPY | Facility: CLINIC | Age: 62
End: 2022-12-13

## 2022-12-15 ENCOUNTER — APPOINTMENT (OUTPATIENT)
Dept: PHYSICAL THERAPY | Facility: CLINIC | Age: 62
End: 2022-12-15

## 2022-12-16 ENCOUNTER — OFFICE VISIT (OUTPATIENT)
Dept: PHYSICAL THERAPY | Facility: CLINIC | Age: 62
End: 2022-12-16

## 2022-12-16 DIAGNOSIS — Z96.641 HISTORY OF TOTAL HIP REPLACEMENT, RIGHT: ICD-10-CM

## 2022-12-16 DIAGNOSIS — M16.11 PRIMARY OSTEOARTHRITIS OF RIGHT HIP: Primary | ICD-10-CM

## 2022-12-16 NOTE — PROGRESS NOTES
Daily Note    Today's date: 2022  Patient name: Yolanda Hill  : 1960  MRN: [id number]  Referring provider: Dai Julio MD  Dx:   Encounter Diagnosis     ICD-10-CM    1  Primary osteoarthritis of right hip  M16 11       2  History of total hip replacement, right  Z96 641           Subjective: Patient was travelling earlier in the week  Some hip stiffness, but overall feeling better  Feeling more comfortable walking short distances without AD  Neck feeling close to "100% " Still some L sided neck stiffness  Objective:  Rx as per treatment diary below  Progressed strengthening program     Unable to perform hip external rotation of abduction against gravity  Still with increased R lateral trunk lean when ambulating without AD  Not as profound as in previous visit  Assessment: Tolerated session well  Making steady progress  Functional limitations remain as follows:  Hip: Decreased standing/walking tolerance, dysfunctional gait, sleep interruption associated with pain  Neck: Difficulty looking side to side for ADLs, sleep interruption associated with pain  Patient would benefit from continued course of skilled physical therapy to address impairments in an effort to improve function        Plan: Continue as per plan of care         Precautions: PMH of DVT and PE, posterior hip precautions          Manuals 22              Gentle PROM R hip within precautions( FF, ABD, ER)  RG RG                              cervical sideglides  RG                     Manual L upper trap stretch  RG                     Neuro Re-Ed                                                                                                                                                                                               Ther Ex                        Quad sets  NP          Bridge with ball sq  3sec  2 x 15 3sec  2 x 15              Hooklying ABD  Green  5sec  2 x 15 Blue  5sec x 30              LAQ  1 5#  3sec  2 x 15 2#  3sec  2 x 15              SAQ  1 5#  3sec  2 x 10 2#  3sec  2 x 15             Tband HS curl Green  2 x 10 Blue  2 x 15             Step up R 6"  2 x 10 6"  2 x 10           Step up and over 4" x 10 4"  2 x 10           Supine hip abduction 3 x 10 3 x 10           Upright bike, st 10 10 min  L2 10 min   L2           Leg press st 12 55#  3 x 12 65#  3 x 12           SL Press (R)  35#  3 x 10           March with SCP NV            Clamshells 1 x 10 unable           Stand at rail : hip abduction  2#  2 x 15           Stand at rail: March  2#  2 x 10                        Cervical SNAG             submax cervical L SB isometrics                          Ther Activity                                                                      Gait Training                                                                  Modalities                       Cryo prn  10 min in sitting to R hip and C/S 10 min in sitting

## 2022-12-20 NOTE — PROGRESS NOTES
As tolerated.   Daily Note/Direct Access Evaluation     Today's date: 2022  Patient name: Shaji Falk  : 1960  MRN: [id number]  Referring provider: Jose Loza PT  Dx:   Encounter Diagnosis     ICD-10-CM    1  Acute strain of neck muscle, subsequent encounter  S16  1XXD       2  History of total hip replacement, right  Z96 641       3  Primary osteoarthritis of right hip  M16 11             Assessment: Patient presents with pain, ROM loss associated with acute onset L sided cervical pain, likely facet mediated  He has  functional limitations as a result of impairments  Would benefit from course of skilled physical therapy to address impairments in an effort to improve function  See goals below  Short Term Goals:  1) Pain : Decrease Cervical  pain to 2/10 at worst x 1 continuous week within 2-3 weeks  2) ROM: Improve ROM by at least 6 degrees for all noted as limited up to full within 2-3 weeks  3) Function: Improved Patient to note greater ease with activities of daily living, return to sleeping in bed within 2-3 weeks       LongTerm Goals:  1) Pain : Eliminate cervical pain x 1 continuous week within 4-6 weeks  2) ROM: Improve ROM to full within 4-6 weeks  3) Function: No ADL limitations as a result of cervical pain within 4-6 weeks  5) Independent with home exercise program within 4-6 weeks  Subjective: Onset  L sided neck pain Tuesday after afte taking a nap  Feel head was in a bad position on pillow  Anderson like he couldn't lift he head after that time  Slept in recliner last two night because unable tl ay down decondary to neck pain  Pain 9/10 at worst, 2/10 at best    In regards to R hip, not as much pain, feels walking is still improving  Objective: See treatment diary below     AROM  Normal Value   Cervical Flexion 40 45   Cervical Extension 20* pain 45   Cervical R Sidebend 25* pain 45   Cervical L Sidebend 5* pain 45   Cervical R Rotation 50 85   Cervical L Rotation 20* pain 85              Strength    R Sidebend 5 5   L Sidebend 5 5   Deep neck flexor NT 39 seconds     Tender to palpation L C/S @ C4/C5 levels with hypomobility for facet closing at same levels    Hip Assessment:  3 weeks, 6 days post op  Hip abductor strength improving, having as easier time with strenghtening therex  Gait improving  Still with unresolved pain, ROM loss, weakness negatively effecting quality of life and function  Patient would benefit from continued course of skilled physical therapy to address impairments in an effort to improve function  Plan: In regards to cervical spine  1-2 visit per week x 3-4 weeks for stretching, strengthening, manuals, modalities, HEP(instructed in today)     IN regards to R hip, continue per previously established plan of care                 Precautions: PMH of DVT and PE, posterior hip precautions          Manuals 11/14/22 11/17 11/21 11/23 11/29 12/2 12/6 12/8        Gentle PROM R hip within precautions( FF, ABD, ER)  RG RG  RG  RG  RG  RG  RG  RG                              cervical sideglides                RG                              Neuro Re-Ed                                                                                                                                                                                               Ther Ex                        Quad sets  10sec x 20 in sitting 5sec  x10 in supine   x10 sitting  10sec x 10 in supine x 10 in sitting  10sec   2 x 10 supine  10sec  2 x 10 supine  10sec  2 x 10 supine  10sec x 10         Glute sets  NV                     Bridge   3sec  2 x 10 With ball sq  3se  2 x 10  With     Ball sq  3sec  2 x 10  with ball sq    2 x 10  with ball sq  3sec  2  x15  3sec  2 x 15  3sec  2 x 15         Hooklying ADD 5sec  2 x 10                     Hooklying ABD  Med[place] Bow  5sec  2 x 10 Pink  5sec  2x 10  Pink  5sec  2 x 15  Pink  5sec  2 x 15  Pink  5sec  2 x 15  Green  2 x 10  3sec  Green  2 x 10  Green  2 x 10        LAQ  3sec  2 x 10 3sec  2 x 15  3sec  2 x 15  1#  2 x 10  1#  2 x 15  1#  2 x 15  1#  2 x 15  1 5#  2 x 15        SAQ  3sec  2 x 10  3sec  2 x 15  1#  3sec  2 x 15  1#  3sec2 xz1 5  1#  3sec  2 x 15  1#  3ec  2 x 15  1#  2 x 15  1 5#  2 x 15       Tband HS curl Pink  2 x 10  Pink  3 x 10    Green  2 x 15  Green  2 x 15  Green  2 x 10  Green  2 x 10  Green  2 1 5  Green  2 x 15       Calf Raises at rail  2 x 10 2 x 15 2 x 15 2 x 15 2 x 15 2 x 15      Mini squats at rail  2 x 10 2 x 15 2 x 15 2 x 15 2 x 15 2 x 15      Step up L   4"  X 10 4"  2 x 10 4"2 x 10 4"  2 x 10 4"  2 x 10 R  2 x 10     Step up and over       X 10 2 x 10     Standing R hip  Flexor streth: L leg on 6: step  10sec x10           Keep leg straight on table    3min          Supine hip abduction     PT assist  2 x 10    No assist  2 x 10 No assist  2 x 10 No assist  3 x 10     Heel slides     3 x 10 X 10       Upright bike, st 10     NV 7 5 min 8min 8 min  L2     Leg press st 12      55#  3 x 12 55#  3 x 10 55#  3 x 12     March with SCP      33' x 2 33' x 4                                Cervical SNAG        10sec x 10 bilat     submax cervical L SB isometrics        5sec x 10     Ther Activity                                                                      Gait Training                        SPC, 2 point gait        25' x 4                                       Modalities                       Cryo prn  10 min  10 min, sitting 10 min sitting  10 min, sitting  10 min, sitting  10 min sitting  5 min sitting  10 min in sitting to R hip and C/S (20 min)       RVL RG

## 2022-12-21 ENCOUNTER — OFFICE VISIT (OUTPATIENT)
Dept: PHYSICAL THERAPY | Facility: CLINIC | Age: 62
End: 2022-12-21

## 2022-12-21 DIAGNOSIS — M16.11 PRIMARY OSTEOARTHRITIS OF RIGHT HIP: Primary | ICD-10-CM

## 2022-12-21 DIAGNOSIS — Z96.641 HISTORY OF TOTAL HIP REPLACEMENT, RIGHT: ICD-10-CM

## 2022-12-21 DIAGNOSIS — S16.1XXD ACUTE STRAIN OF NECK MUSCLE, SUBSEQUENT ENCOUNTER: ICD-10-CM

## 2022-12-21 NOTE — PROGRESS NOTES
Daily Note    Today's date: 2022  Patient name: Aubree Nagy  : 1960  MRN: [id number]  Referring provider: Patrizia Horton MD/Nikita Cardoso  Dx:   Encounter Diagnosis     ICD-10-CM    1  Primary osteoarthritis of right hip  M16 11       2  Acute strain of neck muscle, subsequent encounter  S16  1XXD       3  History of total hip replacement, right  Z96 641           Subjective: Has been walking about a 1/4 of a mile a day  Using 5# weight in L hand when he walks to promote R hip abductor strengthening  Still feels posterolateral stiffness and soreness, especially for first few steps after getting up from chair or getting up in the AM       Reports some continues stiffness at L and now R sides of neck with difficulty looking to L side  Objective:  Rx as per treatment diary below  Progressed hip strengthening program         AROM  Normal Value   Cervical Flexion 40 45   Cervical Extension 30 45   Cervical R Sidebend 35 45   Cervical L Sidebend 30 * pain 45   Cervical R Rotation 75 85   Cervical L Rotation 65 * pain               85       Assessment:  In regards to R hip:  Tolerated progressions well  Able to perform hip abduction in a limited range against gravity today with knee bent, but not straight  Overall making good  Progress now 5 weeks, 5 days postop                            Functional limitations remain as follows:  Hip: Decreased standing/walking tolerance, dysfunctional gait, sleep interruption associated with pain  IN regards to neck: Making good objective and subjective progress  Still with some unresolved pain and tightness  Functional limitations remain as follows  Neck: Difficulty looking side to side for ADLs, sleep interruption associated with pain    Patient would benefit from continued course of skilled physical therapy to address impairments in an effort to improve function        Plan: Continue as per plan of care         Precautions: PMH of DVT and PE, posterior hip precautions          Manuals 12/12/22 12/16 12/21             Gentle PROM R hip within precautions( FF, ABD, ER)  RG RG RG                             cervical sideglides  RG    RG                 Manual L upper trap stretch  RG    Bilat  RG                 Neuro Re-Ed                        Min squats on foam pad      2x 15                 Slow march with movement      50' x 2                  Side stepping at rail (band at knees): limit valgus      Orange  25' x 6                                                                                                                 Ther Ex                        Quad sets  NP                Bridge with ball sq  3sec  2 x 15 3sec  2 x 15              Hooklying ABD  Green  5sec  2 x 15 Blue  5sec x 30 Purple  5sec x 30            Bridge with Tband ABD   Purple  2 x 10           LAQ  1 5#  3sec  2 x 15 2#  3sec  2 x 15 3#  3sec  2  X 15             SAQ  1 5#  3sec  2 x 10 2#  3sec  2 x 15 3#  3sec  2 x 15            Tband HS curl Green  2 x 10 Blue  2 x 15 Blue  2x 15            Step up R 6"  2 x 10 6"  2 x 10           Step up and over 4" x 10 4"  2 x 10           Supine hip abduction 3 x 10 3 x 10           Upright bike, st 10 10 min  L2 10 min   L2 10 min   L2          Leg press st 12 55#  3 x 12 65#  3 x 12 65#  3 x 12          SL Press (R)  35#  3 x 10 35#  3 x 10          March with SCP NV            Clamshells 1 x 10 unable X 10 (limited range)          Stand at rail : hip abduction  2#  2 x 15 2#  2 x 15          Stand at rail: March  2#  2 x 10 2#  2 x 15                       Cervical SNAG             submax cervical L SB isometrics                          Ther Activity                                                                      Gait Training                                                                  Modalities                       Cryo prn  10 min in sitting to R hip and C/S 10 min in sitting 10 min in sitting            MH  C/S     10 min in sitting

## 2022-12-23 ENCOUNTER — OFFICE VISIT (OUTPATIENT)
Dept: PHYSICAL THERAPY | Facility: CLINIC | Age: 62
End: 2022-12-23

## 2022-12-23 DIAGNOSIS — M16.11 PRIMARY OSTEOARTHRITIS OF RIGHT HIP: Primary | ICD-10-CM

## 2022-12-23 DIAGNOSIS — Z96.641 HISTORY OF TOTAL HIP REPLACEMENT, RIGHT: ICD-10-CM

## 2022-12-23 DIAGNOSIS — S16.1XXD ACUTE STRAIN OF NECK MUSCLE, SUBSEQUENT ENCOUNTER: ICD-10-CM

## 2022-12-23 NOTE — PROGRESS NOTES
Daily Note     Today's date: 2022  Patient name: Brisa Allen  : 1960  MRN: [id number]  Referring provider: Lisbet Bacon MD/Nikita Cardoso PT  Dx:   Encounter Diagnosis     ICD-10-CM    1  Primary osteoarthritis of right hip  M16 11       2  History of total hip replacement, right  Z96 641       3  Acute strain of neck muscle, subsequent encounter  S16  1XXD                      Subjective: Was out to eat the last two night  Noted specific difficulty getting up to walk after meal both nights  Neck not as sore today  Objective: See treatment diary below  Progressed hip strengthening  Gait is improving, with less lateral trunk lean noted  Assessment: Tolerated treatment well  Patient demonstrated fatigue post treatment, exhibited good technique with therapeutic exercises and would benefit from continued PT         Functional limitations remain as follows:  Hip: Decreased standing/walking tolerance, dysfunctional gait, sleep interruption associated with pain  Neck: Difficulty looking side to side for ADLs, sleep interruption associated with pain          Plan: Continue per plan of care          Manuals 22            Gentle PROM R hip within precautions( FF, ABD, ER)  RG RG RG RG                            cervical sideglides  RG    RG  RG               Manual L upper trap stretch  RG    Bilat  RG  Bilat B)  RG               Neuro Re-Ed                        Min squats on foam pad      2x 15  2 x 15               Slow march with movement      50' x 2  50' x 4                Side stepping at rail (band at knees): limit valgus      Orange  25' x 6  25' x 6                                                                                                               Ther Ex                        Quad sets  NP   10sec  2 x 10             Bridge with ball sq  3sec  2 x 15 3sec  2 x 15              Hooklying ABD  Green  5sec  2 x 15 Blue  5sec x 30 Purple  5sec x 30 Purple  2 x 15           Bridge with Tband ABD   Purple  2 x 10           LAQ  1 5#  3sec  2 x 15 2#  3sec  2 x 15 3#  3sec  2  X 15 3#  3sec  2 x 15            SAQ  1 5#  3sec  2 x 10 2#  3sec  2 x 15 3#  3sec  2 x 15 3#  3sec  2 x 15           Tband HS curl Green  2 x 10 Blue  2 x 15 Blue  2x 15 Blue  2 x 15           Step up R 6"  2 x 10 6"  2 x 10  6"  2 x 10         Step up and over 4" x 10 4"  2 x 10  4"  2  x10         Supine hip abduction 3 x 10 3 x 10           Upright bike, st 10 10 min  L2 10 min   L2 10 min   L2 10 min  L2         Leg press st 12 55#  3 x 12 65#  3 x 12 65#  3 x 12 65#  3 x 12         SL Press (R)  35#  3 x 10 35#  3 x 10 35#  3 x 10         March with SCP NV            Clamshells 1 x 10 unable X 10 (limited range) 2 x 10         Stand at rail : hip abduction  2#  2 x 15 2#  2 x 15 2#  2 x 15         Stand at rail: March  2#  2 x 10 2#  2 x 15 2#  2 x 15                      Cervical SNAG             submax cervical L SB isometrics                          Ther Activity                                                                      Gait Training                                                                  Modalities                       Cryo prn  10 min in sitting to R hip and C/S 10 min in sitting 10 min in sitting 10 min sititng           MH  C/S     10 min in sitting  10 min sitting

## 2022-12-27 ENCOUNTER — OFFICE VISIT (OUTPATIENT)
Dept: PHYSICAL THERAPY | Facility: CLINIC | Age: 62
End: 2022-12-27

## 2022-12-27 DIAGNOSIS — Z96.641 HISTORY OF TOTAL HIP REPLACEMENT, RIGHT: ICD-10-CM

## 2022-12-27 DIAGNOSIS — M16.11 PRIMARY OSTEOARTHRITIS OF RIGHT HIP: Primary | ICD-10-CM

## 2022-12-27 NOTE — PROGRESS NOTES
Daily Note     Today's date: 2022  Patient name: Pablo Ramires  : 1960  MRN: [id number]  Referring provider: Felicitas Sharpe MD  Dx:   Encounter Diagnosis     ICD-10-CM    1  Primary osteoarthritis of right hip  M16 11       2  History of total hip replacement, right  Z96 641           Start Time: 1130  Stop Time: 1245  Total time in clinic (min): 75 minutes    Subjective: Feeling stronger, anxious to return to golf  Still with posterolateral pains  Objective: See treatment diary below  Progressed LE strengthening program         Assessment: Getting better range out of clamshells indicative of improving hip abductor strength  Patient would benefit from continued course of skilled physical therapy to address impairments in an effort to improve function  Functional limitations remain as follows:  Hip: Decreased standing/walking tolerance, dysfunctional gait, sleep interruption associated with pain                                                                                               Plan: Continue per plan of care          Manuals 22           Gentle PROM R hip within precautions( FF, ABD, ER)  RG RG RG RG RG                           cervical sideglides  RG    RG  RG               Manual L upper trap stretch  RG    Bilat  RG  Bilat B)  RG               Neuro Re-Ed                        Min squats on foam pad      2x 15  2 x 15  2 x 15             Slow march with movement      50' x 2  50' x 4                Side stepping at rail (band at knees): limit valgus      Orange  25' x 6  25' x 6  Orange  25' x 6              Walk with weight in L hand for R hip abductor strengthening           8#  50' x 4                                                                                     Ther Ex                        Quad sets  NP   10sec  2 x 10             Bridge with ball sq  3sec  2 x 15 3sec  2 x 15              Hooklying ABD  Green  5sec  2 x 15 Blue  5sec x 30 Purple  5sec x 30 Purple  2 x 15 Purple  5sec  X 30          Bridge with Tband ABD   Purple  2 x 10  Purple  2 x 10         LAQ  1 5#  3sec  2 x 15 2#  3sec  2 x 15 3#  3sec  2  X 15 3#  3sec  2 x 15 5#  3sec x 30           SAQ  1 5#  3sec  2 x 10 2#  3sec  2 x 15 3#  3sec  2 x 15 3#  3sec  2 x 15 5#  3sec  X 3 0          Tband HS curl Green  2 x 10 Blue  2 x 15 Blue  2x 15 Blue  2 x 15           Step up R 6"  2 x 10 6"  2 x 10  6"  2 x 10 6"  2 x 10        Step up and over 4" x 10 4"  2 x 10  4"  2  x10 4"  2 x 10        Supine hip abduction 3 x 10 3 x 10           Upright bike, st 10 10 min  L2 10 min   L2 10 min   L2 10 min  L2 10 min     L3        Leg press st 12 55#  3 x 12 65#  3 x 12 65#  3 x 12 65#  3 x 12 75#  3 x 10        SL Press (R)  35#  3 x 10 35#  3 x 10 35#  3 x 10 35#  3 x 10        March with SCP NV            Clamshells 1 x 10 unable X 10 (limited range) 2 x 10 2 x 10        Stand at rail : hip abduction  2#  2 x 15 2#  2 x 15 2#  2 x 15 2 5#  2 x 15        Stand at rail: March  2#  2 x 10 2#  2 x 15 2#  2 x 15 2 5#  2 x 15                     Cervical SNAG             submax cervical L SB isometrics                          Ther Activity                                                                      Gait Training                                                                  Modalities                       Cryo prn  10 min in sitting to R hip and C/S 10 min in sitting 10 min in sitting 10 min sititng 10 min sititng          MH  C/S     10 min in sitting  10 min sitting

## 2022-12-30 ENCOUNTER — OFFICE VISIT (OUTPATIENT)
Dept: PHYSICAL THERAPY | Facility: CLINIC | Age: 62
End: 2022-12-30

## 2022-12-30 DIAGNOSIS — M16.11 PRIMARY OSTEOARTHRITIS OF RIGHT HIP: Primary | ICD-10-CM

## 2022-12-30 DIAGNOSIS — Z96.641 HISTORY OF TOTAL HIP REPLACEMENT, RIGHT: ICD-10-CM

## 2022-12-30 DIAGNOSIS — S16.1XXD ACUTE STRAIN OF NECK MUSCLE, SUBSEQUENT ENCOUNTER: ICD-10-CM

## 2022-12-30 NOTE — PROGRESS NOTES
Daily Note     Today's date: 2022  Patient name: Sha Ricci  : 1960  MRN: [id number]  Referring provider: Jojo Ramey MD  Dx:   Encounter Diagnosis     ICD-10-CM    1  Primary osteoarthritis of right hip  M16 11       2  History of total hip replacement, right  Z96 641       3  Acute strain of neck muscle, subsequent encounter  S16  1XXD                      Subjective: Noting some muscular type pain at R posterolateral hip  One episode where he stamped R foot down hard with a temporary increase in pain noted after tht time  Objective: See treatment diary below  Gait continues to improve with cueing to limit lateral trunk lean  Better able to initiate hip abduction against gravity today with knee bent  Assessment: Tolerated treatment well   Patient demonstrated fatigue post treatment, exhibited good technique with therapeutic exercises and would benefit from continued PT      Plan: Continue per plan of care          Manuals 22          Gentle PROM R hip within precautions( FF, ABD, ER)  RG RG RG RG RG RG                          cervical sideglides  RG    RG  RG    RG           Manual L upper trap stretch  RG    Bilat  RG  Bilat B)  RG    RG  Bilat           Neuro Re-Ed                        Min squats on foam pad      2x 15  2 x 15  2 x 15  2 x 15           Slow march with movement      50' x 2  50' x 4    50' x 4            Side stepping at rail (band at knees): limit valgus      Orange  25' x 6  25' x 6  Orange  25' x 6  Orange  25' x 6            Walk with weight in L hand for R hip abductor strengthening           8#  50' x 4  8#  50' x 6                                                                                   Ther Ex                        Quad sets  NP   10sec  2 x 10             Bridge with ball sq  3sec  2 x 15 3sec  2 x 15              Hooklying ABD  Green  5sec  2 x 15 Blue  5sec x 30 Purple  5sec x 30 Purple  2 x 15 Purple  5sec  X 30 Purple  5sec x 30         Bridge with Tband ABD   Purple  2 x 10  Purple  2 x 10 Purple  2 x 10        LAQ  1 5#  3sec  2 x 15 2#  3sec  2 x 15 3#  3sec  2  X 15 3#  3sec  2 x 15 5#  3sec x 30 5#   3sec x 30          SAQ  1 5#  3sec  2 x 10 2#  3sec  2 x 15 3#  3sec  2 x 15 3#  3sec  2 x 15 5#  3sec  X 3 0 5#  3sec x 30         Tband HS curl Green  2 x 10 Blue  2 x 15 Blue  2x 15 Blue  2 x 15  Blue  2 x 15         Step up R 6"  2 x 10 6"  2 x 10  6"  2 x 10 6"  2 x 10 6"  2 x 10       Step up and over 4" x 10 4"  2 x 10  4"  2  x10 4"  2 x 10 4"  2 x 10       Supine hip abduction 3 x 10 3 x 10           Upright bike, st 10 10 min  L2 10 min   L2 10 min   L2 10 min  L2 10 min     L3 10 min  L3       Leg press st 12 55#  3 x 12 65#  3 x 12 65#  3 x 12 65#  3 x 12 75#  3 x 10 75#  3 x 10       SL Press (R)  35#  3 x 10 35#  3 x 10 35#  3 x 10 35#  3 x 10 35#  3 x 10       March with SCP NV            Clamshells 1 x 10 unable X 10 (limited range) 2 x 10 2 x 10 2 x 10       Stand at rail : hip abduction  2#  2 x 15 2#  2 x 15 2#  2 x 15 2 5#  2 x 15 2 5#  2 x 15       Stand at rail: March  2#  2 x 10 2#  2 x 15 2#  2 x 15 2 5#  2 x 15 2 5#  2 x 15                    Cervical SNAG             submax cervical L SB isometrics                          Ther Activity                                                                      Gait Training                                                                  Modalities                       Cryo prn  10 min in sitting to R hip and C/S 10 min in sitting 10 min in sitting 10 min sititng 10 min sititng 10 min  sitting         MH  C/S     10 min in sitting  10 min sitting    10 min in  sitting

## 2023-01-03 ENCOUNTER — OFFICE VISIT (OUTPATIENT)
Dept: PHYSICAL THERAPY | Facility: CLINIC | Age: 63
End: 2023-01-03

## 2023-01-03 DIAGNOSIS — S16.1XXD ACUTE STRAIN OF NECK MUSCLE, SUBSEQUENT ENCOUNTER: ICD-10-CM

## 2023-01-03 DIAGNOSIS — M16.11 PRIMARY OSTEOARTHRITIS OF RIGHT HIP: Primary | ICD-10-CM

## 2023-01-03 DIAGNOSIS — Z96.641 HISTORY OF TOTAL HIP REPLACEMENT, RIGHT: ICD-10-CM

## 2023-01-03 NOTE — PROGRESS NOTES
Daily Note     Today's date: 1/3/2023  Patient name: Shayna Crowe  : 1960  MRN: [id number]  Referring provider: Benitez Villela MD  Dx:   Encounter Diagnosis     ICD-10-CM    1  Primary osteoarthritis of right hip  M16 11       2  History of total hip replacement, right  Z96 641       3  Acute strain of neck muscle, subsequent encounter  S16  1XXD           Start Time: 1009  Stop Time: 1126  Total time in clinic (min): 77 minutes    Subjective: Hit a few golfballs (chip and putt) over the weekend without any worsening R hip pain  Feels stamina is improving  Able to spend more time on feet  Neck has been feeling better during the day, stiff and sore at night  Trying to find a supportive pillow  Objective: See treatment diary below  Able to control trunk lean well with marching and when holding weight, needs cueing to do this when trying to ambulate with a normal gait  Assessment: Continues to make good progress  Tolerated treatment well   Patient demonstrated fatigue post treatment, exhibited good technique with therapeutic exercises and would benefit from continued PT      Plan: Continue per plan of care          Manuals 12/12/22 12/16 12/21 12/23 12/27 12/30 1/3/23         Gentle PROM R hip within precautions( FF, ABD, ER)  RG RG RG RG RG RG RG                         cervical sideglides  RG    RG  RG    RG RG         Manual L upper trap stretch  RG    Bilat  RG  Bilat B)  RG    RG  Bilat  RG  bilat         Neuro Re-Ed                        Min squats on foam pad      2x 15  2 x 15  2 x 15  2 x 15  2 x 15         Slow march with movement      50' x 2  50' x 4    50' x 4  50' x 4          Side stepping at rail (band at knees): limit valgus      Orange  25' x 6  25' x 6  Orange  25' x 6  Orange  25' x 6  Pink  25' x 6          Walk with weight in L hand for R hip abductor strengthening           8#  50' x 4  8#  50' x 6  8#  50' x 6                                                                                 Ther Ex                        Quad sets  NP   10sec  2 x 10             Bridge with ball sq  3sec  2 x 15 3sec  2 x 15              Hooklying ABD  Green  5sec  2 x 15 Blue  5sec x 30 Purple  5sec x 30 Purple  2 x 15 Purple  5sec  X 30 Purple  5sec x 30 Purple  5sec x 30        Bridge with Tband ABD   Purple  2 x 10  Purple  2 x 10 Purple  2 x 10 Purple  2 x 10       LAQ  1 5#  3sec  2 x 15 2#  3sec  2 x 15 3#  3sec  2  X 15 3#  3sec  2 x 15 5#  3sec x 30 5#   3sec x 30 5#  3sec x 30         SAQ  1 5#  3sec  2 x 10 2#  3sec  2 x 15 3#  3sec  2 x 15 3#  3sec  2 x 15 5#  3sec  X 3 0 5#  3sec x 30 5#  3sec x 30        Tband HS curl Green  2 x 10 Blue  2 x 15 Blue  2x 15 Blue  2 x 15  Blue  2 x 15 Blue  2 x 15        Step up R 6"  2 x 10 6"  2 x 10  6"  2 x 10 6"  2 x 10 6"  2 x 10 6"  2 x 10      Step up and over 4" x 10 4"  2 x 10  4"  2  x10 4"  2 x 10 4"  2 x 10 4"  2 x 10      Supine hip abduction 3 x 10 3 x 10           Upright bike, st 10 10 min  L2 10 min   L2 10 min   L2 10 min  L2 10 min     L3 10 min  L3 10 min  L3      Leg press st 12 55#  3 x 12 65#  3 x 12 65#  3 x 12 65#  3 x 12 75#  3 x 10 75#  3 x 10 75#     3 x 10      SL Press (R)  35#  3 x 10 35#  3 x 10 35#  3 x 10 35#  3 x 10 35#  3 x 10 35#  3 x 10      March with SCP NV            Clamshells 1 x 10 unable X 10 (limited range) 2 x 10 2 x 10 2 x 10 2 x 10      Stand at rail : hip abduction  2#  2 x 15 2#  2 x 15 2#  2 x 15 2 5#  2 x 15 2 5#  2 x 15 2 5#  2 x 15      Stand at rail: March  2#  2 x 10 2#  2 x 15 2#  2 x 15 2 5#  2 x 15 2 5#  2 x 15 2 5#  2 x 15                   Cervical SNAG             submax cervical L SB isometrics                          Ther Activity                                                                      Gait Training                                                                  Modalities                       Cryo prn  10 min in sitting to R hip and C/S 10 min in sitting 10 min in sitting 10 min sititng 10 min sititng 10 min  sitting 10 min sitting        MH  C/S     10 min in sitting  10 min sitting    10 min in  sitting  10 min sitting

## 2023-01-06 ENCOUNTER — OFFICE VISIT (OUTPATIENT)
Dept: PHYSICAL THERAPY | Facility: CLINIC | Age: 63
End: 2023-01-06

## 2023-01-06 DIAGNOSIS — Z96.641 HISTORY OF TOTAL HIP REPLACEMENT, RIGHT: ICD-10-CM

## 2023-01-06 DIAGNOSIS — M16.11 PRIMARY OSTEOARTHRITIS OF RIGHT HIP: Primary | ICD-10-CM

## 2023-01-06 NOTE — PROGRESS NOTES
Daily Note     Today's date: 2023  Patient name: Ivelisse Vega  : 1960  MRN: [id number]  Referring provider: Veto Horowitz MD  Dx:   Encounter Diagnosis     ICD-10-CM    1  Primary osteoarthritis of right hip  M16 11       2  History of total hip replacement, right  Z96 641                      Subjective: Tenderness to R hip continues to improve  Now able to lay on R side in bed for short periods of time  Objective: See treatment diary below  Progressed LE strengthening program (increased weight for leg press)      Gait: Still some R lateral trunk lean   Assessment: Eight weeks post op  Tolerated treatment well  Patient demonstrated fatigue post treatment, exhibited good technique with therapeutic exercises and would benefit from continued PT   Still needs to build more hip strength to help restore normalized gait pattern      Plan: Continue per plan of care          Manuals 12/12/22 12/16 12/21 12/23 12/27 12/30 1/3/23 1/6        Gentle PROM R hip within precautions( FF, ABD, ER)  RG RG RG RG RG RG RG RG                        cervical sideglides  RG    RG  RG    RG RG         Manual L upper trap stretch  RG    Bilat  RG  Bilat B)  RG    RG  Bilat  RG  bilat         Neuro Re-Ed                        Min squats on foam pad      2x 15  2 x 15  2 x 15  2 x 15  2 x 15  2 x 15       Slow march with movement      50' x 2  50' x 4    50' x 4  50' x 4    YMB  50' x 4        Side stepping at rail (band at knees): limit valgus      Orange  25' x 6  25' x 6  Orange  25' x 6  Orange  25' x 6  Pink  25' x 6  Green  26' x 6        Walk with weight in L hand for R hip abductor strengthening           8#  50' x 4  8#  50' x 6  8#  50' x 6  8#  50' x 6                                                                               Ther Ex                        Quad sets  NP   10sec  2 x 10             Bridge with ball sq  3sec  2 x 15 3sec  2 x 15              Hooklying ABD  Green  5sec  2 x 15 Blue  5sec x 30 Purple  5sec x 30 Purple  2 x 15 Purple  5sec  X 30 Purple  5sec x 30 Purple  5sec x 30 Purple  5sec x 30       Bridge with Tband ABD   Purple  2 x 10  Purple  2 x 10 Purple  2 x 10 Purple  2 x 10 Purple  2 x 10      LAQ  1 5#  3sec  2 x 15 2#  3sec  2 x 15 3#  3sec  2  X 15 3#  3sec  2 x 15 5#  3sec x 30 5#   3sec x 30 5#  3sec x 30 5#  3sec x 30        SAQ  1 5#  3sec  2 x 10 2#  3sec  2 x 15 3#  3sec  2 x 15 3#  3sec  2 x 15 5#  3sec  X 3 0 5#  3sec x 30 5#  3sec x 30 5#  3sec x 30       Tband HS curl Green  2 x 10 Blue  2 x 15 Blue  2x 15 Blue  2 x 15  Blue  2 x 15 Blue  2 x 15 Blue  2 x 15       Step up R 6"  2 x 10 6"  2 x 10  6"  2 x 10 6"  2 x 10 6"  2 x 10 6"  2 x 10 6"  2 x 10     Step up and over 4" x 10 4"  2 x 10  4"  2  x10 4"  2 x 10 4"  2 x 10 4"  2 x 10 4"  2 x 10     Supine hip abduction 3 x 10 3 x 10           Upright bike, st 10 10 min  L2 10 min   L2 10 min   L2 10 min  L2 10 min     L3 10 min  L3 10 min  L3 10 min   L3     Leg press st 12 55#  3 x 12 65#  3 x 12 65#  3 x 12 65#  3 x 12 75#  3 x 10 75#  3 x 10 75#     3 x 10 85#  3 x 10     SL Press (R)  35#  3 x 10 35#  3 x 10 35#  3 x 10 35#  3 x 10 35#  3 x 10 35#  3 x 10 35#  3 x 10     March with SCP NV            Clamshells 1 x 10 unable X 10 (limited range) 2 x 10 2 x 10 2 x 10 2 x 10 2 x 10     Stand at rail : hip abduction  2#  2 x 15 2#  2 x 15 2#  2 x 15 2 5#  2 x 15 2 5#  2 x 15 2 5#  2 x 15 2 5#  2 x 15     Stand at rail: March  2#  2 x 10 2#  2 x 15 2#  2 x 15 2 5#  2 x 15 2 5#  2 x 15 2 5#  2 x 15 2 5#  2 x 15                  Cervical SNAG             submax cervical L SB isometrics                          Ther Activity                                                                      Gait Training                                                                  Modalities                       Cryo prn  10 min in sitting to R hip and C/S 10 min in sitting 10 min in sitting 10 min sititng 10 min sititng 10 min  sitting 10 min sitting 10 min sitting       MH  C/S     10 min in sitting  10 min sitting    10 min in  sitting  10 min sitting  10 min sitting

## 2023-01-09 ENCOUNTER — OFFICE VISIT (OUTPATIENT)
Dept: PHYSICAL THERAPY | Facility: CLINIC | Age: 63
End: 2023-01-09

## 2023-01-09 DIAGNOSIS — Z96.641 HISTORY OF TOTAL HIP REPLACEMENT, RIGHT: ICD-10-CM

## 2023-01-09 DIAGNOSIS — M16.11 PRIMARY OSTEOARTHRITIS OF RIGHT HIP: Primary | ICD-10-CM

## 2023-01-09 NOTE — PROGRESS NOTES
Progress  Note     Today's date: 2023  Patient name: Carla Caballero  : 1960  MRN: [id number]  Referring provider: Ben Field MD  Dx:   Encounter Diagnosis     ICD-10-CM    1  Primary osteoarthritis of right hip  M16 11       2  History of total hip replacement, right  Z96 641                 Assessment :  Assessment details: Patient is a 58 y o  male s/p R total hip replacement 22    8 weeks, 3 days post op  Since starting therapy pt has made the following progress towards goals:  1) Decreased pain  2) Improved range of motion  3) Improved strength  4) Improved self rated functional score  Making good progress, still with functional limitations as a result of pain, weakness and AROM loss  Recommend continued short course of therapy, see updated goals below  Understanding of Dx/Px/POC: excellent   Short Term Goals:   1) Pain : Decrease hip pain to 2/10 at worst x 1 continuous week within 3-4weeks  2) AROM: Improve hip AROM by at least 10 degrees for flexion, ER within 3-4 weeks  3) Strength: Improved LE strength by at least 1/2 grade for all noted as weak within 3-4 weeks  4) Function: Improved FOTO score from IE within 3-4 weeks (23@ IE), patient to note greater ease of ambulation subjectively, patient to transition to use of cane from walker for community ambulation within 3 weeks  LongTerm Goals:   1) Pain : Eliminate hip pain x 1 continuous week within 8 weeks  2) AROM:Improve hip AROM to WNL within the confines of any post-op precuations  3) Strength: Improved LE strength to 5/5 within 8 weeks  4) Function: Improved FOTO score to at least 55 ; discharge AD for community ambulation, no reported difficulty with ADLs as they pertain to hip within 8 weeks  5) (I) with HEP within 8 weeks  Plan   Rx to include stretching, strengthening, manual therapy, modalities for pain and inflammation control, HEP   2-3x's per week x 6-8 weeks     Subjective Evaluation   History of Present Illness   Mechanism of injury: Patient is a 64 y o  old male who presents for an initial outpatient physical therapy consultation regarding his R hip pain  About one year history of bilateral hip pain, R worse than L  Underwent a R MAHENDRA on 22  Post op c/o pain, difficulty transferring, difficulty walking  He is having sleep interruption associated with pain  Pain   Current pain ratin  At worst pain rating: 10  Social Support   Employment status: working (nothing physical, hip pain does not interrupt work tasks), will be retiring within a few months  Patient Goals   Decrease pain, improve motion and ability to walk, return to golf   Objective   No drainage noted from surgical incision site R posterior hip, tegaderm dressing intact  Active Range of Motion   Right Hip   Flexion:60 degrees with R hip pain  Extension: -5 degrees with pain  Abduction: 20 degrees with pain   External rotation (90/90): 5 degrees with pain  Passive Range of Motion   Right Hip   Flexion: 80 degrees with pain  Extension: 0 degres with pain  Abduction: 20 degrees with pain  External rotation (90/90): 5 degrees with pain  Strength/Myotome Testing   Right Hip   Planes of Motion   Flexion: 2 (pain)  Abduction: 2 (pain)   Extension : 2(pain   Right Knee   Flexion: 3+/5   Extension 3   Ambulation   Weight-Bearing Status   Weight-Bearing Status (Left): full weight bearing   Weight-Bearing Status (Right): WBAT   Observational Gait   Gait: Wheeled walker  Needed cueing to get R heel during initial contact  Decreased right stance time     Functional Assessment   Precautions: PMH of DVT and PE, posterior hip precautions                   Manuals 12/12/22 12/16 12/21 12/23 12/27 12/30 1/3/23 1/6        Gentle PROM R hip within precautions( FF, ABD, ER)  RG RG RG RG RG RG RG RG                        cervical sideglides  RG    RG  RG    RG RG         Manual L upper trap stretch  RG    Bilat  RG  Bilat B)  RG    RG  Bilat  RG  bilat         Neuro Re-Ed                        Min squats on foam pad      2x 15  2 x 15  2 x 15  2 x 15  2 x 15  2 x 15       Slow march with movement      50' x 2  50' x 4    50' x 4  50' x 4    YMB  50' x 4        Side stepping at rail (band at knees): limit valgus      Orange  25' x 6  25' x 6  Orange  25' x 6  Orange  25' x 6  Pink  25' x 6  Green  26' x 6        Walk with weight in L hand for R hip abductor strengthening           8#  50' x 4  8#  50' x 6  8#  50' x 6  8#  50' x 6                                                                               Ther Ex                        Quad sets  NP   10sec  2 x 10             Bridge with ball sq  3sec  2 x 15 3sec  2 x 15              Hooklying ABD  Green  5sec  2 x 15 Blue  5sec x 30 Purple  5sec x 30 Purple  2 x 15 Purple  5sec  X 30 Purple  5sec x 30 Purple  5sec x 30 Purple  5sec x 30       Bridge with Tband ABD   Purple  2 x 10  Purple  2 x 10 Purple  2 x 10 Purple  2 x 10 Purple  2 x 10      LAQ  1 5#  3sec  2 x 15 2#  3sec  2 x 15 3#  3sec  2  X 15 3#  3sec  2 x 15 5#  3sec x 30 5#   3sec x 30 5#  3sec x 30 5#  3sec x 30        SAQ  1 5#  3sec  2 x 10 2#  3sec  2 x 15 3#  3sec  2 x 15 3#  3sec  2 x 15 5#  3sec  X 3 0 5#  3sec x 30 5#  3sec x 30 5#  3sec x 30       Tband HS curl Green  2 x 10 Blue  2 x 15 Blue  2x 15 Blue  2 x 15  Blue  2 x 15 Blue  2 x 15 Blue  2 x 15       Step up R 6"  2 x 10 6"  2 x 10  6"  2 x 10 6"  2 x 10 6"  2 x 10 6"  2 x 10 6"  2 x 10     Step up and over 4" x 10 4"  2 x 10  4"  2  x10 4"  2 x 10 4"  2 x 10 4"  2 x 10 4"  2 x 10     Supine hip abduction 3 x 10 3 x 10           Upright bike, st 10 10 min  L2 10 min   L2 10 min   L2 10 min  L2 10 min     L3 10 min  L3 10 min  L3 10 min   L3     Leg press st 12 55#  3 x 12 65#  3 x 12 65#  3 x 12 65#  3 x 12 75#  3 x 10 75#  3 x 10 75#     3 x 10 85#  3 x 10     SL Press (R)  35#  3 x 10 35#  3 x 10 35#  3 x 10 35#  3 x 10 35#  3 x 10 35#  3 x 10 35#  3 x 10 March with SCP NV            Clamshells 1 x 10 unable X 10 (limited range) 2 x 10 2 x 10 2 x 10 2 x 10 2 x 10     Stand at rail : hip abduction  2#  2 x 15 2#  2 x 15 2#  2 x 15 2 5#  2 x 15 2 5#  2 x 15 2 5#  2 x 15 2 5#  2 x 15     Stand at rail: March  2#  2 x 10 2#  2 x 15 2#  2 x 15 2 5#  2 x 15 2 5#  2 x 15 2 5#  2 x 15 2 5#  2 x 15                  Cervical SNAG             submax cervical L SB isometrics                          Ther Activity                                                                      Gait Training                                                                  Modalities                       Cryo prn  10 min in sitting to R hip and C/S 10 min in sitting 10 min in sitting 10 min sititng 10 min sititng 10 min  sitting 10 min sitting 10 min sitting       MH  C/S     10 min in sitting  10 min sitting    10 min in  sitting  10 min sitting  10 min sitting

## 2023-01-09 NOTE — LETTER
[id number]    MD Adelso Paez [address] [place] [id number]    Patient: Talita Avalos   YOB: 1960   Date of Visit: 2023     Encounter Diagnosis     ICD-10-CM    1  Primary osteoarthritis of right hip  M16 11       2  History of total hip replacement, right  [id number] [id number]           Dear Dr Ally Coleman: Thank you for your recent referral of Talita Avalos  Please review the attached evaluation summary from Jethro's recent visit  Please verify that you agree with the plan of care by signing the attached order  If you have any questions or concerns, please do not hesitate to call  I sincerely appreciate the opportunity to share in the care of one of your patients and hope to have another opportunity to work with you in the near future  Sincerely,    Odalis Carmichael, PT      Referring Provider:      I certify that I have read the below Plan of Care and certify the need for these services furnished under this plan of treatment while under my care  MD Adelso Paez [address] [place] [id number]  Via Fax: [phone]          Progress  Note     Today's date: 2023  Patient name: Talita Avalos  : 1960  MRN: [id number]  Referring provider: Heath Farris MD  Dx:   Encounter Diagnosis     ICD-10-CM    1  Primary osteoarthritis of right hip  M16 11       2  History of total hip replacement, right  [id number] [id number]                 Assessment :  Assessment details: Patient is a 58 y o  male s/p R total hip replacement 22    8 weeks, 3 days post op  Since starting therapy pt has made the following progress towards goals:  1) Decreased pain  2) Improved range of motion  3) Improved strength  4) Improved self rated functional score  Making good progress, still with functional limitations as a result of pain, weakness and AROM loss  Recommend continued short course of therapy, see updated goals below        Understanding of Dx/Px/POC: excellent   Short Term Goals:   1) Pain : Decrease hip pain to 2/10 at worst x 1 continuous week within 3-4weeks  2) AROM: Improve hip AROM by at least 10 degrees for flexion, ER within 3-4 weeks  3) Strength: Improved LE strength by at least 1/2 grade for all noted as weak within 3-4 weeks  4) Function: Improved FOTO score from IE within 3-4 weeks (23@ IE), patient to note greater ease of ambulation subjectively, patient to transition to use of cane from walker for community ambulation within 3 weeks  LongTerm Goals:   1) Pain : Eliminate hip pain x 1 continuous week within 8 weeks  2) AROM:Improve hip AROM to WNL within the confines of any post-op precuations  3) Strength: Improved LE strength to 5/5 within 8 weeks  4) Function: Improved FOTO score to at least 55 ; discharge AD for community ambulation, no reported difficulty with ADLs as they pertain to hip within 8 weeks  5) (I) with HEP within 8 weeks  Plan   Rx to include stretching, strengthening, manual therapy, modalities for pain and inflammation control, HEP   2-3x's per week x 6-8 weeks  Subjective Evaluation   History of Present Illness   Mechanism of injury: Patient is a 64 y o  old male who presents for an initial outpatient physical therapy consultation regarding his R hip pain  About one year history of bilateral hip pain, R worse than L  Underwent a R MAHENDRA on 22  Post op c/o pain, difficulty transferring, difficulty walking  He is having sleep interruption associated with pain  Pain   Current pain ratin  At worst pain rating: 10  Social Support   Employment status: working (nothing physical, hip pain does not interrupt work tasks), will be retiring within a few months  Patient Goals   Decrease pain, improve motion and ability to walk, return to golf   Objective   No drainage noted from surgical incision site R posterior hip, tegaderm dressing intact     Active Range of Motion   Right Hip Flexion:60 degrees with R hip pain  Extension: -5 degrees with pain  Abduction: 20 degrees with pain   External rotation (90/90): 5 degrees with pain  Passive Range of Motion   Right Hip   Flexion: 80 degrees with pain  Extension: 0 degres with pain  Abduction: 20 degrees with pain  External rotation (90/90): 5 degrees with pain  Strength/Myotome Testing   Right Hip   Planes of Motion   Flexion: 2 (pain)  Abduction: 2 (pain)   Extension : 2(pain   Right Knee   Flexion: 3+/5   Extension 3/5   Ambulation   Weight-Bearing Status   Weight-Bearing Status (Left): full weight bearing   Weight-Bearing Status (Right): WBAT   Observational Gait   Gait: Wheeled walker  Needed cueing to get R heel during initial contact  Decreased right stance time     Functional Assessment   Precautions: PMH of DVT and PE, posterior hip precautions                  Manuals 12/12/22 12/16 12/21 12/23 12/27 12/30 1/3/23 1/6        Gentle PROM R hip within precautions( FF, ABD, ER)  RG RG RG RG RG RG RG RG                        cervical sideglides  RG    RG  RG    RG RG         Manual L upper trap stretch  RG    Bilat  RG  Bilat B)  RG    RG  Bilat  RG  bilat         Neuro Re-Ed                        Min squats on foam pad      2x 15  2 x 15  2 x 15  2 x 15  2 x 15  2 x 15       Slow march with movement      50' x 2  50' x 4    50' x 4  50' x 4    YMB  50' x 4        Side stepping at rail (band at knees): limit valgus      Orange  25' x 6  25' x 6  Orange  25' x 6  Orange  25' x 6  Pink  25' x 6  Green  26' x 6        Walk with weight in L hand for R hip abductor strengthening           8#  50' x 4  8#  50' x 6  8#  50' x 6  8#  50' x 6                                                                               Ther Ex                        Quad sets  NP   10sec  2 x 10             Bridge with ball sq  3sec  2 x 15 3sec  2 x 15              Hooklying ABD  Green  5sec  2 x 15 Blue  5sec x 30 Purple  5sec x 30 Purple  2 x 15 Purple  5sec  X 30 Purple  5sec x 30 Purple  5sec x 30 Purple  5sec x 30       Bridge with Tband ABD   Purple  2 x 10  Purple  2 x 10 Purple  2 x 10 Purple  2 x 10 Purple  2 x 10      LAQ  1 5#  3sec  2 x 15 2#  3sec  2 x 15 3#  3sec  2  X 15 3#  3sec  2 x 15 5#  3sec x 30 5#   3sec x 30 5#  3sec x 30 5#  3sec x 30        SAQ  1 5#  3sec  2 x 10 2#  3sec  2 x 15 3#  3sec  2 x 15 3#  3sec  2 x 15 5#  3sec  X 3 0 5#  3sec x 30 5#  3sec x 30 5#  3sec x 30       Tband HS curl Green  2 x 10 Blue  2 x 15 Blue  2x 15 Blue  2 x 15  Blue  2 x 15 Blue  2 x 15 Blue  2 x 15       Step up R 6"  2 x 10 6"  2 x 10  6"  2 x 10 6"  2 x 10 6"  2 x 10 6"  2 x 10 6"  2 x 10     Step up and over 4" x 10 4"  2 x 10  4"  2  x10 4"  2 x 10 4"  2 x 10 4"  2 x 10 4"  2 x 10     Supine hip abduction 3 x 10 3 x 10           Upright bike, st 10 10 min  L2 10 min   L2 10 min   L2 10 min  L2 10 min     L3 10 min  L3 10 min  L3 10 min   L3     Leg press st 12 55#  3 x 12 65#  3 x 12 65#  3 x 12 65#  3 x 12 75#  3 x 10 75#  3 x 10 75#     3 x 10 85#  3 x 10     SL Press (R)  35#  3 x 10 35#  3 x 10 35#  3 x 10 35#  3 x 10 35#  3 x 10 35#  3 x 10 35#  3 x 10     March with SCP NV            Clamshells 1 x 10 unable X 10 (limited range) 2 x 10 2 x 10 2 x 10 2 x 10 2 x 10     Stand at rail : hip abduction  2#  2 x 15 2#  2 x 15 2#  2 x 15 2 5#  2 x 15 2 5#  2 x 15 2 5#  2 x 15 2 5#  2 x 15     Stand at rail: March  2#  2 x 10 2#  2 x 15 2#  2 x 15 2 5#  2 x 15 2 5#  2 x 15 2 5#  2 x 15 2 5#  2 x 15                  Cervical SNAG             submax cervical L SB isometrics                          Ther Activity                                                                      Gait Training                                                                  Modalities                       Cryo prn  10 min in sitting to R hip and C/S 10 min in sitting 10 min in sitting 10 min sititng 10 min sititng 10 min  sitting 10 min sitting 10 min sitting       MH  C/S     10 min in sitting  10 min sitting    10 min in  sitting  10 min sitting  10 min sitting                      Progress  Note     Today's date: 2023  Patient name: Ok Springer  : 1960  MRN: [id number]  Referring provider: Mere Bone MD  Dx:   Encounter Diagnosis     ICD-10-CM    1  Primary osteoarthritis of right hip  M16 11       2  History of total hip replacement, right  [id number] [id number]                 Assessment :  Assessment details: Patient is a 58 y o  male s/p R total hip replacement 22    8 weeks, 3 days post op  Since starting therapy pt has made the following progress towards goals:  1) Decreased pain  2) Improved range of motion  3) Improved strength  4) Improved  ambulatory ability and elf rated functional score(FOTO score improved to 67 from 23 @ IE    Making good progress, still with functional limitations as a result of pain, weakness and AROM loss  Recommend continued short course of therapy, see updated goals below  Understanding of Dx/Px/POC: excellent   Short Term Goals:   1) Pain : Decrease hip pain to 2/10 at worst x 1 continuous week within 3-4weeks  -not yet met  2) AROM: Improve hip AROM by at least 10 degrees for flexion, ER within 3-4 weeks  -met  3) Strength: Improved LE strength by at least 1/2 grade for all noted as weak within 3-4 weeks  -met  4) Function: Improved FOTO score from IE within 3-4 weeks (23@ IE), patient to note greater ease of ambulation subjectively, patient to transition to use of cane from walker for community ambulation within 3 weeks  -met  LongTerm Goals:   1) Pain : Eliminate hip pain x 1 continuous week within 8 weeks  -not yet met  2) AROM:Improve hip AROM to WNL within the confines of any post-op precuations  -not yet met  3) Strength: Improved LE strength to 5/5 within 8 weeks   -not yet met  4) Function: Improved FOTO score to at least 55 ; discharge AD for community ambulation, no reported difficulty with ADLs as they pertain to hip within 8 weeks -partially met  5) (I) with HEP within 8 weeks  -not yet met  Plan   Rx to include stretching, strengthening, manual therapy, modalities for pain and inflammation control, HEP   2-3x's per week x4 weeks  Subjective Evaluation   History of Present Illness   Mechanism of injury: Patient is a 64 y o  old male who presents for an initial outpatient physical therapy consultation regarding his R hip pain  About one year history of bilateral hip pain, R worse than L  Underwent a R MAHENDRA on 22  Post op c/o pain, difficulty transferring, difficulty walking  He is having sleep interruption associated with pain  UPDATE 23:  Noting improvement  Having less pain, having an easier time getting around  Has returned to driving, has returned to sleeping in bed  Now able to lay on R side  Still with posterolateral hip pain with first few steps after getting up from sitting position    Still feels he is limping  Still can not don socks without adaptive equipment  Has returned to chipping and putting  Pain   Current pain ratin  At worst pain ratin  Social Support :   Employment status: working (nothing physical, hip pain does not interrupt work tasks)  Patient Goals : Making progress towards personal goals  Decrease pain, improve motion and ability to walk, return to golf   Objective Incision site is healed    Active Range of Motion     Active Range of Motion  Right Hip   Flexion:85 degrees   Extension: 10 degrees   Abduction: 35 degrees with pain   External rotation (90/90): 15 degrees with pain    Passive Range of Motion   Right Hip   Flexion: 90 degrees   Extension: 20 degres with pain  Abduction: 40 degrees with pain  External Rotation: 10 degrees with pain  External rotation (90/90): 5 degrees with pain    Strength/Myotome Testing   Right Hip   Planes of Motion   Flexion: 4+  Abduction: 3 (pain)   Extension : 4    Adduction in hooklying is 5/5    Right Knee   Flexion: 5/5   Extension 5/5   Ambulation     Weight-Bearing Status   Weight-Bearing Status (Left): full weight bearing   Weight-Bearing Status (Right): full weight bearing  Observational Gait   Gait: no AD  At times with increased R lateral trunk lean  Non reciprocal up/down stairs      Functional Assessment : able to perform squat without increased pain    Precautions: PMH of DVT and PE, posterior hip precautions                  Manuals 12/12/22 12/16 12/21 12/23 12/27 12/30 1/3/23 1/6 1/9      Gentle PROM R hip within precautions( FF, ABD, ER)  RG RG RG RG RG RG RG RG  RG                      cervical sideglides  RG    RG  RG    RG RG         Manual L upper trap stretch  RG    Bilat  RG  Bilat B)  RG    RG  Bilat  RG  bilat         Neuro Re-Ed                        Min squats on foam pad      2x 15  2 x 15  2 x 15  2 x 15  2 x 15  2 x 15  2 x 5     Slow march with movement      50' x 2  50' x 4    50' x 4  50' x 4    YMB  50' x 4  YMB  50' x 3      Side stepping at rail (band at knees): limit valgus      Orange  25' x 6  25' x 6  Orange  25' x 6  Orange  25' x 6  Pink  25' x 6  Green  26' x 6  Green  25' x 8      Walk with weight in L hand for R hip abductor strengthening           8#  50' x 4  8#  50' x 6  8#  50' x 6  8#  50' x 6  8#  50' x 6                                                                             Ther Ex                        Quad sets  NP   10sec  2 x 10             Bridge with ball sq  3sec  2 x 15 3sec  2 x 15              Hooklying ABD  Green  5sec  2 x 15 Blue  5sec x 30 Purple  5sec x 30 Purple  2 x 15 Purple  5sec  X 30 Purple  5sec x 30 Purple  5sec x 30 Purple  5sec x 30 Purple  5sec x 30     Bridge with Tband ABD   Purple  2 x 10  Purple  2 x 10 Purple  2 x 10 Purple  2 x 10 Purple  2 x 10 Purple  2 x 10     LAQ  1 5#  3sec  2 x 15 2#  3sec  2 x 15 3#  3sec  2  X 15 3#  3sec  2 x 15 5#  3sec x 30 5#   3sec x 30 5#  3sec x 30 5#  3sec x 30  6#  3sec x 30      SAQ  1 5#  3sec  2 x 10 2#  3sec  2 x 15 3#  3sec  2 x 15 3#  3sec  2 x 15 5#  3sec  X 3 0 5#  3sec x 30 5#  3sec x 30 5#  3sec x 30  6#  3sec x 30     Tband HS curl Green  2 x 10 Blue  2 x 15 Blue  2x 15 Blue  2 x 15  Blue  2 x 15 Blue  2 x 15 Blue  2 x 15  Blue  2 x 15     Step up R 6"  2 x 10 6"  2 x 10  6"  2 x 10 6"  2 x 10 6"  2 x 10 6"  2 x 10 6"  2 x 10 6"  2 x 10    Step up and over 4" x 10 4"  2 x 10  4"  2  x10 4"  2 x 10 4"  2 x 10 4"  2 x 10 4"  2 x 10 4"  2 x 10    Supine hip abduction 3 x 10 3 x 10           Upright bike, st 10 10 min  L2 10 min   L2 10 min   L2 10 min  L2 10 min     L3 10 min  L3 10 min  L3 10 min   L3 10 min  L3    Leg press st 12 55#  3 x 12 65#  3 x 12 65#  3 x 12 65#  3 x 12 75#  3 x 10 75#  3 x 10 75#     3 x 10 85#  3 x 10 85#  3 x 12    SL Press (R)  35#  3 x 10 35#  3 x 10 35#  3 x 10 35#  3 x 10 35#  3 x 10 35#  3 x 10 35#  3 x 10 35#  3 x 12    March with SCP NV            Clamshells 1 x 10 unable X 10 (limited range) 2 x 10 2 x 10 2 x 10 2 x 10 2 x 10 2 x 10    SLR ABD         0#   X 5    Stand at rail : hip abduction  2#  2 x 15 2#  2 x 15 2#  2 x 15 2 5#  2 x 15 2 5#  2 x 15 2 5#  2 x 15 2 5#  2 x 15 3#  2 x 15    Stand at rail: March  2#  2 x 10 2#  2 x 15 2#  2 x 15 2 5#  2 x 15 2 5#  2 x 15 2 5#  2 x 15 2 5#  2 x 15 3#  2 x 15                 Cervical SNAG             submax cervical L SB isometrics                          Ther Activity                                                                      Gait Training                                                                  Modalities                       Cryo prn  10 min in sitting to R hip and C/S 10 min in sitting 10 min in sitting 10 min sititng 10 min sititng 10 min  sitting 10 min sitting 10 min sitting  10 min sitting     MH  C/S     10 min in sitting  10 min sitting    10 min in  sitting  10 min sitting  10 min sitting  10 min sitting

## 2023-01-09 NOTE — PROGRESS NOTES
Progress  Note     Today's date: 2023  Patient name: Domingo Light  : 1960  MRN: [id number]  Referring provider: Vernie Simmonds, MD  Dx:   Encounter Diagnosis     ICD-10-CM    1  Primary osteoarthritis of right hip  M16 11       2  History of total hip replacement, right  Z96 [id number]                 Assessment :  Assessment details: Patient is a 58 y o  male s/p R total hip replacement 22    8 weeks, 3 days post op  Since starting therapy pt has made the following progress towards goals:  1) Decreased pain  2) Improved range of motion  3) Improved strength  4) Improved  ambulatory ability and elf rated functional score(FOTO score improved to 67 from 23 @ IE    Making good progress, still with functional limitations as a result of pain, weakness and AROM loss  Recommend continued short course of therapy, see updated goals below  Understanding of Dx/Px/POC: excellent   Short Term Goals:   1) Pain : Decrease hip pain to 2/10 at worst x 1 continuous week within 3-4weeks  -not yet met  2) AROM: Improve hip AROM by at least 10 degrees for flexion, ER within 3-4 weeks  -met  3) Strength: Improved LE strength by at least 1/2 grade for all noted as weak within 3-4 weeks  -met  4) Function: Improved FOTO score from IE within 3-4 weeks (23@ IE), patient to note greater ease of ambulation subjectively, patient to transition to use of cane from walker for community ambulation within 3 weeks  -met  LongTerm Goals:   1) Pain : Eliminate hip pain x 1 continuous week within 8 weeks  -not yet met  2) AROM:Improve hip AROM to WNL within the confines of any post-op precuations  -not yet met  3) Strength: Improved LE strength to 5/5 within 8 weeks  -not yet met  4) Function: Improved FOTO score to at least 55 ; discharge AD for community ambulation, no reported difficulty with ADLs as they pertain to hip within 8 weeks  -partially met  5) (I) with HEP within 8 weeks   -not yet met  Plan   Rx to include stretching, strengthening, manual therapy, modalities for pain and inflammation control, HEP   2-3x's per week x4 weeks  Subjective Evaluation   History of Present Illness   Mechanism of injury: Patient is a 64 y o  old male who presents for an initial outpatient physical therapy consultation regarding his R hip pain  About one year history of bilateral hip pain, R worse than L  Underwent a R MAHENDRA on 22  Post op c/o pain, difficulty transferring, difficulty walking  He is having sleep interruption associated with pain  UPDATE 23:  Noting improvement  Having less pain, having an easier time getting around  Has returned to driving, has returned to sleeping in bed  Now able to lay on R side  Still with posterolateral hip pain with first few steps after getting up from sitting position    Still feels he is limping  Still can not don socks without adaptive equipment  Has returned to chipping and putting  Pain   Current pain ratin  At worst pain ratin  Social Support :   Employment status: working (nothing physical, hip pain does not interrupt work tasks)  Patient Goals : Making progress towards personal goals  Decrease pain, improve motion and ability to walk, return to golf   Objective Incision site is healed    Active Range of Motion     Active Range of Motion  Right Hip   Flexion:85 degrees   Extension: 10 degrees   Abduction: 35 degrees with pain   External rotation (90/90): 15 degrees with pain    Passive Range of Motion   Right Hip   Flexion: 90 degrees   Extension: 20 degres with pain  Abduction: 40 degrees with pain  External Rotation: 10 degrees with pain  External rotation (90/90): 5 degrees with pain    Strength/Myotome Testing   Right Hip   Planes of Motion   Flexion: 4+  Abduction: 3 (pain)   Extension : 4    Adduction in hooklying is 5/5    Right Knee   Flexion: 5/5   Extension 5/5   Ambulation     Weight-Bearing Status   Weight-Bearing Status (Left): full weight bearing Weight-Bearing Status (Right): full weight bearing  Observational Gait   Gait: no AD  At times with increased R lateral trunk lean  Non reciprocal up/down stairs      Functional Assessment : able to perform squat without increased pain    Precautions: PMH of DVT and PE, posterior hip precautions                   Manuals 12/12/22 12/16 12/21 12/23 12/27 12/30 1/3/23 1/6 1/9      Gentle PROM R hip within precautions( FF, ABD, ER)  RG RG RG RG RG RG RG RG  RG                      cervical sideglides  RG    RG  RG    RG RG         Manual L upper trap stretch  RG    Bilat  RG  Bilat B)  RG    RG  Bilat  RG  bilat         Neuro Re-Ed                        Min squats on foam pad      2x 15  2 x 15  2 x 15  2 x 15  2 x 15  2 x 15  2 x 5     Slow march with movement      50' x 2  50' x 4    50' x 4  50' x 4    YMB  50' x 4  YMB  50' x 3      Side stepping at rail (band at knees): limit valgus      Orange  25' x 6  25' x 6  Orange  25' x 6  Orange  25' x 6  Pink  25' x 6  Green  26' x 6  Green  25' x 8      Walk with weight in L hand for R hip abductor strengthening           8#  50' x 4  8#  50' x 6  8#  50' x 6  8#  50' x 6  8#  50' x 6                                                                             Ther Ex                        Quad sets  NP   10sec  2 x 10             Bridge with ball sq  3sec  2 x 15 3sec  2 x 15              Hooklying ABD  Green  5sec  2 x 15 Blue  5sec x 30 Purple  5sec x 30 Purple  2 x 15 Purple  5sec  X 30 Purple  5sec x 30 Purple  5sec x 30 Purple  5sec x 30 Purple  5sec x 30     Bridge with Tband ABD   Purple  2 x 10  Purple  2 x 10 Purple  2 x 10 Purple  2 x 10 Purple  2 x 10 Purple  2 x 10     LAQ  1 5#  3sec  2 x 15 2#  3sec  2 x 15 3#  3sec  2  X 15 3#  3sec  2 x 15 5#  3sec x 30 5#   3sec x 30 5#  3sec x 30 5#  3sec x 30  6#  3sec x 30      SAQ  1 5#  3sec  2 x 10 2#  3sec  2 x 15 3#  3sec  2 x 15 3#  3sec  2 x 15 5#  3sec  X 3 0 5#  3sec x 30 5#  3sec x 30 5#  3sec x 30  6#  3sec x 30     Tband HS curl Green  2 x 10 Blue  2 x 15 Blue  2x 15 Blue  2 x 15  Blue  2 x 15 Blue  2 x 15 Blue  2 x 15  Blue  2 x 15     Step up R 6"  2 x 10 6"  2 x 10  6"  2 x 10 6"  2 x 10 6"  2 x 10 6"  2 x 10 6"  2 x 10 6"  2 x 10    Step up and over 4" x 10 4"  2 x 10  4"  2  x10 4"  2 x 10 4"  2 x 10 4"  2 x 10 4"  2 x 10 4"  2 x 10    Supine hip abduction 3 x 10 3 x 10           Upright bike, st 10 10 min  L2 10 min   L2 10 min   L2 10 min  L2 10 min     L3 10 min  L3 10 min  L3 10 min   L3 10 min  L3    Leg press st 12 55#  3 x 12 65#  3 x 12 65#  3 x 12 65#  3 x 12 75#  3 x 10 75#  3 x 10 75#     3 x 10 85#  3 x 10 85#  3 x 12    SL Press (R)  35#  3 x 10 35#  3 x 10 35#  3 x 10 35#  3 x 10 35#  3 x 10 35#  3 x 10 35#  3 x 10 35#  3 x 12    March with SCP NV            Clamshells 1 x 10 unable X 10 (limited range) 2 x 10 2 x 10 2 x 10 2 x 10 2 x 10 2 x 10    SLR ABD         0#   X 5    Stand at rail : hip abduction  2#  2 x 15 2#  2 x 15 2#  2 x 15 2 5#  2 x 15 2 5#  2 x 15 2 5#  2 x 15 2 5#  2 x 15 3#  2 x 15    Stand at rail: March  2#  2 x 10 2#  2 x 15 2#  2 x 15 2 5#  2 x 15 2 5#  2 x 15 2 5#  2 x 15 2 5#  2 x 15 3#  2 x 15                 Cervical SNAG             submax cervical L SB isometrics                          Ther Activity                                                                      Gait Training                                                                  Modalities                       Cryo prn  10 min in sitting to R hip and C/S 10 min in sitting 10 min in sitting 10 min sititng 10 min sititng 10 min  sitting 10 min sitting 10 min sitting  10 min sitting     MH  C/S     10 min in sitting  10 min sitting    10 min in  sitting  10 min sitting  10 min sitting  10 min sitting

## 2023-01-13 ENCOUNTER — OFFICE VISIT (OUTPATIENT)
Dept: PHYSICAL THERAPY | Facility: CLINIC | Age: 63
End: 2023-01-13

## 2023-01-13 DIAGNOSIS — Z96.641 HISTORY OF TOTAL HIP REPLACEMENT, RIGHT: ICD-10-CM

## 2023-01-13 DIAGNOSIS — M16.11 PRIMARY OSTEOARTHRITIS OF RIGHT HIP: Primary | ICD-10-CM

## 2023-01-13 NOTE — PROGRESS NOTES
Daily Note     Today's date: 2023  Patient name: Margy Harper  : 1960  MRN: [id number]  Referring provider: Keisha Allison MD  Dx:   Encounter Diagnosis     ICD-10-CM    1  Primary osteoarthritis of right hip  M16 11       2  History of total hip replacement, right  Z96 641                      Subjective: Has been elizabeth up stairs reciprocally  Feel R knee wants to turn in when he does this  Has been focusing on hip abductor strengthening for HEP  Objective: See treatment diary below  Gait: Improving, but still with (+) R lateral trunk lean  Correct with marching drill  Assessment: Tolerated treatment well  Patient still with functional abductor weakness as decribed in subjectve  Patient would benefit from continued course of skilled physical therapy to address impairments in an effort to improve function          Plan: Continue per plan of care          Manuals 12/12/22 12/16 12/21 12/23 12/27 12/30 1/3/23 1/6 1/9  1/13    Gentle PROM R hip within precautions( FF, ABD, ER)  RG RG RG RG RG RG RG RG  RG  RG   Gentle Manual R hip flexor stretch off edge of table             RG    cervical sideglides  RG    RG  RG    RG RG       Manual L upper trap stretch  RG    Bilat  RG  Bilat B)  RG    RG  Bilat  RG  bilat       Neuro Re-Ed                        Min squats on foam pad      2x 15  2 x 15  2 x 15  2 x 15  2 x 15  2 x 15  2 x 5  2 x 15   Slow march with movement      50' x 2  50' x 4    50' x 4  50' x 4    YMB  50' x 4  YMB  50' x 3 YMB  50' x 4    Side stepping at rail (band at knees): limit valgus      Orange  25' x 6  25' x 6  Orange  25' x 6  Orange  25' x 6  Pink  25' x 6  Green  26' x 6  Green  25' x 8  Green  25' x 8    Walk with weight in L hand for R hip abductor strengthening           8#  50' x 4  8#  50' x 6  8#  50' x 6  8#  50' x 6  8#  50' x 6  10#  50' x 6                                                                           Ther Ex                        Quad sets  NP 10sec  2 x 10             Bridge with ball sq  3sec  2 x 15 3sec  2 x 15              Hooklying ABD  Green  5sec  2 x 15 Blue  5sec x 30 Purple  5sec x 30 Purple  2 x 15 Purple  5sec  X 30 Purple  5sec x 30 Purple  5sec x 30 Purple  5sec x 30 Purple  5sec x 30  Purple  5sec x 30   Bridge with Tband ABD   Purple  2 x 10  Purple  2 x 10 Purple  2 x 10 Purple  2 x 10 Purple  2 x 10 Purple  2 x 10 Purple  3sec  2 x 15    LAQ  1 5#  3sec  2 x 15 2#  3sec  2 x 15 3#  3sec  2  X 15 3#  3sec  2 x 15 5#  3sec x 30 5#   3sec x 30 5#  3sec x 30 5#  3sec x 30  6#  3sec x 30  6#  3sec x 30    SAQ  1 5#  3sec  2 x 10 2#  3sec  2 x 15 3#  3sec  2 x 15 3#  3sec  2 x 15 5#  3sec  X 3 0 5#  3sec x 30 5#  3sec x 30 5#  3sec x 30  6#  3sec x 30  6#  3sec x 30   Tband HS curl Green  2 x 10 Blue  2 x 15 Blue  2x 15 Blue  2 x 15  Blue  2 x 15 Blue  2 x 15 Blue  2 x 15  Blue  2 x 15  Blue  2x  15   Step up R 6"  2 x 10 6"  2 x 10  6"  2 x 10 6"  2 x 10 6"  2 x 10 6"  2 x 10 6"  2 x 10 6"  2 x 10 6"  2 x 10   Step up and over 4" x 10 4"  2 x 10  4"  2  x10 4"  2 x 10 4"  2 x 10 4"  2 x 10 4"  2 x 10 4"  2 x 10 4"  X 30   Supine hip abduction 3 x 10 3 x 10           Upright bike, st 10 10 min  L2 10 min   L2 10 min   L2 10 min  L2 10 min     L3 10 min  L3 10 min  L3 10 min   L3 10 min  L3 10 min   L3   Leg press st 12 55#  3 x 12 65#  3 x 12 65#  3 x 12 65#  3 x 12 75#  3 x 10 75#  3 x 10 75#     3 x 10 85#  3 x 10 85#  3 x 12 85#  3 x 12   SL Press (R)  35#  3 x 10 35#  3 x 10 35#  3 x 10 35#  3 x 10 35#  3 x 10 35#  3 x 10 35#  3 x 10 35#  3 x 12 35#  3 x 12 INCREASE FOR NV   March with SCP NV            Clamshells 1 x 10 unable X 10 (limited range) 2 x 10 2 x 10 2 x 10 2 x 10 2 x 10 2 x 10 2 x 10    SLR ABD         0#   X 5 0#  1 x 10   Stand at rail : hip abduction  2#  2 x 15 2#  2 x 15 2#  2 x 15 2 5#  2 x 15 2 5#  2 x 15 2 5#  2 x 15 2 5#  2 x 15 3#  2 x 15 3#  2 x 15   Stand at rail: March  2#  2 x 10 2#  2 x 15 2#  2 x 15 2 5#  2 x 15 2 5#  2 x 15 2 5#  2 x 15 2 5#  2 x 15 3#  2 x 15 3#  2 x 15   SLR flexion          0#  3sec x 10   Cervical SNAG             submax cervical L SB isometrics                          Ther Activity                                                                      Gait Training                                                                  Modalities                       Cryo prn  10 min in sitting to R hip and C/S 10 min in sitting 10 min in sitting 10 min sititng 10 min sititng 10 min  sitting 10 min sitting 10 min sitting  10 min sitting  10 min sitting   MH  C/S     10 min in sitting  10 min sitting    10 min in  sitting  10 min sitting  10 min sitting  10 min sitting

## 2023-01-16 ENCOUNTER — OFFICE VISIT (OUTPATIENT)
Dept: PHYSICAL THERAPY | Facility: CLINIC | Age: 63
End: 2023-01-16

## 2023-01-16 DIAGNOSIS — M16.11 PRIMARY OSTEOARTHRITIS OF RIGHT HIP: Primary | ICD-10-CM

## 2023-01-16 DIAGNOSIS — Z96.641 HISTORY OF TOTAL HIP REPLACEMENT, RIGHT: ICD-10-CM

## 2023-01-18 ENCOUNTER — OFFICE VISIT (OUTPATIENT)
Dept: PHYSICAL THERAPY | Facility: CLINIC | Age: 63
End: 2023-01-18

## 2023-01-18 DIAGNOSIS — M16.11 PRIMARY OSTEOARTHRITIS OF RIGHT HIP: Primary | ICD-10-CM

## 2023-01-18 DIAGNOSIS — Z96.641 HISTORY OF TOTAL HIP REPLACEMENT, RIGHT: ICD-10-CM

## 2023-01-18 NOTE — PROGRESS NOTES
Daily Note     Today's date: 2023  Patient name: Shayne Roe  : 1960  MRN: [id number]  Referring provider: Gaby Stnoe MD  Dx:   Encounter Diagnosis     ICD-10-CM    1  Primary osteoarthritis of right hip  M16 11       2  History of total hip replacement, right  Z96 641              Stop Time: 1114       Subjective:   Still notices a limp  Can get L socks/shoes on with difficulty, still can not cross R leg over L to get R socks and shoes on  Objective: See treatment diary below  Progressed LE strengthening program       Assessment: Tolerated treatment well  Patient would benefit from continued course of skilled physical therapy to address impairments in an effort to improve function        Plan: Continue per plan of care          Manuals 23            Gentle PROM R hip within precautions( FF, ABD, ER)  RGRG RG           Gentle Manual R hip flexor stretch off edge of table  RG RG            Neuro Re-Ed                        Mini squats on foam pad  2 x 15  2 x 15           Slow march with movement YMB  50' x 6  YMB  50' x 6            Side stepping at rail (band at knees): limit valgus  Green  25' x 8  25' x 8            Walk with weight in L hand for R hip abductor strengthening   10#  50' x 6 10#  50' x 6                                                                                   Ther Ex                       Upright Bike   St11 10 min  L3 10 min  L3            Hooklying ABD  Purple  5sec  X 30 Purple  5sec x 30           Bridge with Tband ABD Purpk3  3sec  2x 15 Purple  3sec   2 x15            LAQ  6#  3sec  2 x 15 6#  3sec x 30            SAQ  6#  3sec  2 x 15 6#  3sec x 30           Tband HS curl Blue  2 x 15 Blue  2 x 15           Step up R 6"  2 x 10 6"  2 x 10           Step up and over 6"   2x 10 6" x 30           Leg press st 12 85#  3 x 12 85#  3 x 12           SL Press (R) 45#  3 x 10 45#  3 x 12           Clamshells 2 x 10 3sec  2 x 10           SLR ABD 0#  2 x10 0#  2  10           Stand at rail : hip abduction 3#  2 x 15 3#  2 x 15           Stand at rail: March 3#  2 x 15 3#  2 x 15           SLR flexion 0#  2 x 10 0#  2 x 10                                                  Ther Activity                                                                      Gait Training                                                                  Modalities                       Cryo prn  10 min in sitting  10 min, sitting

## 2023-01-23 ENCOUNTER — APPOINTMENT (OUTPATIENT)
Dept: PHYSICAL THERAPY | Facility: CLINIC | Age: 63
End: 2023-01-23

## 2023-01-27 ENCOUNTER — OFFICE VISIT (OUTPATIENT)
Dept: PHYSICAL THERAPY | Facility: CLINIC | Age: 63
End: 2023-01-27

## 2023-01-27 DIAGNOSIS — Z96.641 HISTORY OF TOTAL HIP REPLACEMENT, RIGHT: ICD-10-CM

## 2023-01-27 DIAGNOSIS — M16.11 PRIMARY OSTEOARTHRITIS OF RIGHT HIP: Primary | ICD-10-CM

## 2023-01-27 NOTE — PROGRESS NOTES
Daily Note     Today's date: 2023  Patient name: Milton Nation  : 1960  MRN: [id number]  Referring provider: Bernadette Ellington MD  Dx:   Encounter Diagnosis     ICD-10-CM    1  Primary osteoarthritis of right hip  M16 11       2  History of total hip replacement, right  Z96 641                      Subjective:   Noting improvement  Feels he may have had a breakthrough in his recovery as of yesterday with improved walking tolerance with minimal pain despite increased time on feet  Still can not put socks on R side  Has had some intermittent R knee pain  Objective: See treatment diary below  Progressed LE strengthening program   R hip abductor strength improved to 4/5  Still with some mild R lateral trunk lean with walking  Assessment: Tolerated treatment well  Continues to make good progress  Still some unresolved functional weakness and ROM/flexibility loss  Patient would benefit from continued course of skilled physical therapy to address impairments in an effort to improve function  Plan: Continue per plan of care    Possible discharge after next week         Manuals 23           Gentle PROM R hip within precautions( FF, ABD, ER)  RGRG RG RG          Gentle Manual R hip flexor stretch off edge of table  RG RG RG           Neuro Re-Ed                        Mini squats on foam pad  2 x 15  2 x 15 2 x 15          Slow march with movement YMB  50' x 6  YMB  50' x 6 YMB  50' x 4           Side stepping at rail (band at knees): limit valgus  Green  25' x 8  25' x 8 Green  25' x 8           Walk with weight in L hand for R hip abductor strengthening   10#  50' x 6 10#  50' x 6 10#  50' x 6                                                                                  Ther Ex                       Upright Bike   St11 10 min  L3 10 min  L3 10 min  L3           Hooklying ABD  Purple  5sec  X 30 Purple  5sec x 30           Bridge with Tband ABD Purpk3  3sec  2x 15 Purple  3sec   2 x15 Purple  3sec   2 x 15           LAQ  6#  3sec  2 x 15 6#  3sec x 30 6# x 30           SAQ  6#  3sec  2 x 15 6#  3sec x 30 D/C          Tband HS curl Blue  2 x 15 Blue  2 x 15 Blue  2 x 15          Step up R 6"  2 x 10 6"  2 x 10           Step up and over 6"   2x 10 6" x 30 6" x 20          Leg press st 12 85#  3 x 12 85#  3 x 12 85# x 10  95#  2 x 10          SL Press (R) 45#  3 x 10 45#  3 x 12 45#  3 x 12          Clamshells 2 x 10 3sec  2 x 10 Orange   band  3sec  2 x 10            SLR ABD 0#  2 x10 0#  2  10 0#  2 x 10          Stand at rail : hip abduction 3#  2 x 15 3#  2 x 15 3#  2 x 15          Stand at rail: March 3#  2 x 15 3#  2 x 15 3#  2 x 15          SLR flexion 0#  2 x 10 0#  2 x 10 0#  2 x 10                                                 Ther Activity                                                                      Gait Training                                                                  Modalities                       Cryo prn  10 min in sitting  10 min, sitting   def

## 2023-01-30 ENCOUNTER — OFFICE VISIT (OUTPATIENT)
Dept: PHYSICAL THERAPY | Facility: CLINIC | Age: 63
End: 2023-01-30

## 2023-01-30 DIAGNOSIS — Z96.641 HISTORY OF TOTAL HIP REPLACEMENT, RIGHT: ICD-10-CM

## 2023-01-30 DIAGNOSIS — M16.11 PRIMARY OSTEOARTHRITIS OF RIGHT HIP: Primary | ICD-10-CM

## 2023-01-30 NOTE — PROGRESS NOTES
Daily Note     Today's date: 2023  Patient name: Michael Vincent  : 1960  MRN: [id number]  Referring provider: Karthik Rodriguez MD  Dx:   Encounter Diagnosis     ICD-10-CM    1  Primary osteoarthritis of right hip  M16 11       2  History of total hip replacement, right  Z96 641                      Subjective: No new c/o  Objective: See treatment diary below  Hip flexor tightness improved via manual assessment/kaylind Jethro  Assessment: Tolerated treatment well   Patient demonstrated fatigue post treatment, exhibited good technique with therapeutic exercises and would benefit from continued PT      Plan: Continue per plan of care          Manuals 23          Gentle PROM R hip within precautions( FF, ABD, ER)  RGRG RG RG RG         Gentle Manual R hip flexor stretch off edge of table  RG RG RG RG          Neuro Re-Ed                        Mini squats on foam pad  2 x 15  2 x 15 2 x 15 2 x 15         Slow march with movement YMB  50' x 6  YMB  50' x 6 YMB  50' x 4 YMB  50' x 4          Side stepping at rail (band at knees): limit valgus  Green  25' x 8  25' x 8 Green  25' x 8 Blue  25' x 8          Walk with weight in L hand for R hip abductor strengthening   10#  50' x 6 10#  50' x 6 10#  50' x 6 10#  50' x 6                                                                                 Ther Ex                       Upright Bike   St11 10 min  L3 10 min  L3 10 min  L3 10 min  L3          Hooklying ABD  Purple  5sec  X 30 Purple  5sec x 30  Purple  5sec x 30         Bridge with Tband ABD Purpk3  3sec  2x 15 Purple  3sec   2 x15 Purple  3sec   2 x 15 Purple  3sec  2  15          LAQ  6#  3sec  2 x 15 6#  3sec x 30 6# x 30 6# x 30          SAQ  6#  3sec  2 x 15 6#  3sec x 30 D/C          Tband HS curl Blue  2 x 15 Blue  2 x 15 Blue  2 x 15 Blue  2 x 15         Step up R 6"  2 x 10 6"  2 x 10           Step up and over 6"   2x 10 6" x 30 6" x 20 6" x 30         Leg press st 12 85#  3 x 12 85#  3 x 12 85# x 10  95#  2 x 10 95#  3 x 10         SL Press (R) 45#  3 x 10 45#  3 x 12 45#  3 x 12 45#  3 x 12         Clamshells 2 x 10 3sec  2 x 10 Orange   band  3sec  2 x 10   [id number]    2 x 10         SLR ABD 0#  2 x10 0#  2  10 0#  2 x 10 0#  2 x 10         Stand at rail : hip abduction 3#  2 x 15 3#  2 x 15 3#  2 x 15 3#  2 x 15         Stand at rail: March 3#  2 x 15 3#  2 x 15 3#  2 x 15 3#  2 x 15         SLR flexion 0#  2 x 10 0#  2 x 10 0#  2 x 10 0#  2 x 10                                                Ther Activity                                                                      Gait Training                                                                  Modalities                       Cryo prn  10 min in sitting  10 min, sitting   def

## 2023-02-02 ENCOUNTER — OFFICE VISIT (OUTPATIENT)
Dept: PHYSICAL THERAPY | Facility: CLINIC | Age: 63
End: 2023-02-02

## 2023-02-02 DIAGNOSIS — M16.11 PRIMARY OSTEOARTHRITIS OF RIGHT HIP: ICD-10-CM

## 2023-02-02 DIAGNOSIS — Z96.641 HISTORY OF TOTAL HIP REPLACEMENT, RIGHT: Primary | ICD-10-CM

## 2023-02-02 NOTE — PROGRESS NOTES
Discharge  Today's date: 2023  Patient name: Beena Martinez  : 1960  MRN: [id number]  Referring provider: Jay Villalba MD  Dx:   Encounter Diagnosis     ICD-10-CM    1  History of total hip replacement, right  Z96 641       2  Primary osteoarthritis of right hip  M16 11         Assessment :  Assessment details: Patient is a 58 y o  male s/p R total hip replacement 22         Since starting therapy pt has made the following progress towards goals:  1) Decreased pain  2) Improved range of motion  3) Improved strength  4) Improved  ambulatory ability and elf rated functional score(FOTO score improved to 77 from 23 @ IE    Lorrie Medrano has made excellent progress  Gait should fully normalized with continued HEP focused on hip abductor strengthening  Understanding of Dx/Px/POC: excellent   Short Term Goals:   1) Pain : Decrease hip pain to 2/10 at worst x 1 continuous week within 3-4weeks  -met  2) AROM: Improve hip AROM by at least 10 degrees for flexion, ER within 3-4 weeks  -met  3) Strength: Improved LE strength by at least 1/2 grade for all noted as weak within 3-4 weeks  -met  4) Function: Improved FOTO score from IE within 3-4 weeks (23@ IE), patient to note greater ease of ambulation subjectively, patient to transition to use of cane from walker for community ambulation within 3 weeks  -met  LongTerm Goals:   1) Pain : Eliminate hip pain x 1 continuous week within 8 weeks  -not yet met  2) AROM:Improve hip AROM to WNL within the confines of any post-op precuations  -not yet met  3) Strength: Improved LE strength to 5/5 within 8 weeks  -not yet met  4) Function: Improved FOTO score to at least 55 ; discharge AD for community ambulation, no reported difficulty with ADLs as they pertain to hip within 8 weeks  -partially met  5) (I) with HEP within 8 weeks   - met  Plan :  Advised patient to continue with home exercise program   Advised patient to contact me with any future questions or concerns regarding exercise  Pt is in agreement with discharge plan  Subjective Evaluation   History of Present Illness   Mechanism of injury: Patient is a 64 y o  old male who presents for an initial outpatient physical therapy consultation regarding his R hip pain  About one year history of bilateral hip pain, R worse than L  Underwent a R MAHENDRA on 22  Post op c/o pain, difficulty transferring, difficulty walking  He is having sleep interruption associated with pain          UPDATE 23:  Noting improvement  Having less pain, having an easier time getting around  Has returned to driving, has returned to sleeping in bed  Now able to lay on R side  Still with posterolateral hip pain with first few steps after getting up from sitting position    Still feels he is limping  Still can not don socks without adaptive equipment      Has returned to chipping and putting  UPDATE 23:  Feels strength is improving, sleeping better on R side  Still notes a limp, but tells me this has gone on for several years  First few steps after getting up feel stiff  R knee feels like it wants to turn in at times on steps  Pain   Current pain ratin  At worst pain ratin  Social Support :   Employment status: working (nothing physical, hip pain does not interrupt work tasks)  Patient Goals : Making progress towards personal goals      Decrease pain, improve motion and ability to walk, return to golf   Objective Incision site is healed    Active Range of Motion      Active Range of Motion  Right Hip   Flexion:90 degrees   Extension: 20 degrees   Abduction: 45 degrees with pain   External rotation (90/90): 15 degrees with pain     Strength/Myotome Testing   Right Hip   Planes of Motion   Flexion: 4+  Abduction: 4   Extension : 4+     Adduction in hooklying is 5/5     Right Knee   Flexion: 5/5   Extension 5/5   Ambulation      Weight-Bearing Status   Weight-Bearing Status (Left): full weight bearing   Weight-Bearing Status (Right): full weight bearing  Observational Gait   Gait: no AD  At times with increased R lateral trunk lean  Reciprocal up/down stairs        Functional Assessment : able to perform squat without increased pain    Precautions: PMH of DVT and PE, posterior hip precautions                 Manuals 1/16/23 1/18 1/27 1/30 2/2         Gentle PROM R hip within precautions( FF, ABD, ER)  RGRG RG RG RG RG        Gentle Manual R hip flexor stretch off edge of table  RG RG RG RG RG         Neuro Re-Ed                        Mini squats on foam pad  2 x 15  2 x 15 2 x 15 2 x 15 2 x 15        Slow march with movement YMB  50' x 6  YMB  50' x 6 YMB  50' x 4 YMB  50' x 4 Hold 15# weight in L hand  50' x 4         Side stepping at rail (band at knees): limit valgus  Green  25' x 8  25' x 8 Green  25' x 8 Blue  25' x 8 Blue  25'x  10         Walk with weight in L hand for R hip abductor strengthening   10#  50' x 6 10#  50' x 6 10#  50' x 6 10#  50' x 6 15#  50' x 6                                                                                Ther Ex                       Upright Bike   St11 10 min  L3 10 min  L3 10 min  L3 10 min  L3 10 min  L3         Hooklying ABD  Purple  5sec  X 30 Purple  5sec x 30  Purple  5sec x 30         Bridge with Tband ABD Purpk3  3sec  2x 15 Purple  3sec   2 x15 Purple  3sec   2 x 15 Purple  3sec  2  15 PUple  2 x 15         LAQ  6#  3sec  2 x 15 6#  3sec x 30 6# x 30 6# x 30 6#   x 30         SAQ  6#  3sec  2 x 15 6#  3sec x 30 D/C          Tband HS curl Blue  2 x 15 Blue  2 x 15 Blue  2 x 15 Blue  2 x 15 Blue   2 x15        Step up R 6"  2 x 10 6"  2 x 10           Step up and over 6"   2x 10 6" x 30 6" x 20 6" x 30 6" x 30        Leg press st 12 85#  3 x 12 85#  3 x 12 85# x 10  95#  2 x 10 95#  3 x 10 95#  3 x 12        SL Press (R) 45#  3 x 10 45#  3 x 12 45#  3 x 12 45#  3 x 12 45#  3 x 12        Clamshells 2 x 10 3sec  2 x 10 Orange band  3sec  2 x 10   Orange  3sec    2 x 10 Orange  3 x 10        SLR ABD 0#  2 x10 0#  2  10 0#  2 x 10 0#  2 x 10 0#  3 x 10        Stand at rail : hip abduction 3#  2 x 15 3#  2 x 15 3#  2 x 15 3#  2 x 15 3#  2 x 15        Stand at rail: March 3#  2 x 15 3#  2 x 15 3#  2 x 15 3#  2 x 15 3#  2 x15        SLR flexion 0#  2 x 10 0#  2 x 10 0#  2 x 10 0#  2 x 10 0#  2 x 10                                               Ther Activity                                                                      Gait Training                                                                  Modalities                       Cryo prn  10 min in sitting  10 min, sitting   def  10 min

## 2023-02-03 ENCOUNTER — APPOINTMENT (OUTPATIENT)
Dept: PHYSICAL THERAPY | Facility: CLINIC | Age: 63
End: 2023-02-03

## 2023-11-24 ENCOUNTER — TELEPHONE (OUTPATIENT)
Dept: FAMILY MEDICINE CLINIC | Facility: CLINIC | Age: 63
End: 2023-11-24

## 2023-12-04 ENCOUNTER — OFFICE VISIT (OUTPATIENT)
Dept: FAMILY MEDICINE CLINIC | Facility: CLINIC | Age: 63
End: 2023-12-04
Payer: COMMERCIAL

## 2023-12-04 VITALS
SYSTOLIC BLOOD PRESSURE: 98 MMHG | TEMPERATURE: 97.5 F | OXYGEN SATURATION: 98 % | HEIGHT: 71 IN | HEART RATE: 68 BPM | WEIGHT: 241.4 LBS | DIASTOLIC BLOOD PRESSURE: 76 MMHG | BODY MASS INDEX: 33.8 KG/M2

## 2023-12-04 DIAGNOSIS — K21.9 GERD WITHOUT ESOPHAGITIS: Primary | ICD-10-CM

## 2023-12-04 DIAGNOSIS — Z00.00 ANNUAL PHYSICAL EXAM: ICD-10-CM

## 2023-12-04 DIAGNOSIS — C80.0: ICD-10-CM

## 2023-12-04 DIAGNOSIS — F32.A DEPRESSION, UNSPECIFIED DEPRESSION TYPE: ICD-10-CM

## 2023-12-04 DIAGNOSIS — Z13.220 SCREENING FOR CHOLESTEROL LEVEL: ICD-10-CM

## 2023-12-04 DIAGNOSIS — Z13.0 SCREENING FOR IRON DEFICIENCY ANEMIA: ICD-10-CM

## 2023-12-04 DIAGNOSIS — R73.9 ELEVATED BLOOD SUGAR: ICD-10-CM

## 2023-12-04 PROCEDURE — 99396 PREV VISIT EST AGE 40-64: CPT | Performed by: FAMILY MEDICINE

## 2023-12-04 RX ORDER — FAMOTIDINE 40 MG/1
40 TABLET, FILM COATED ORAL 2 TIMES DAILY PRN
COMMUNITY

## 2023-12-04 RX ORDER — MAGNESIUM GLYCINATE 100 MG
300 TABLET ORAL
Qty: 90 TABLET | Refills: 0 | Status: SHIPPED | OUTPATIENT
Start: 2023-12-04

## 2023-12-04 NOTE — PATIENT INSTRUCTIONS
Wellness Visit for Adults   AMBULATORY CARE:   A wellness visit  is when you see your healthcare provider to get screened for health problems. Your healthcare provider will also give you advice on how to stay healthy. Write down your questions so you remember to ask them. Ask your healthcare provider how often you should have a wellness visit. What happens at a wellness visit:  Your healthcare provider will ask about your health, and your family history of health problems. This includes high blood pressure, heart disease, and cancer. He or she will ask if you have symptoms that concern you, if you smoke, and about your mood. You may also be asked about your intake of medicines, supplements, food, and alcohol. Any of the following may be done: Your weight  will be checked. Your height may also be checked so your body mass index (BMI) can be calculated. Your BMI shows if you are at a healthy weight. Your blood pressure  and heart rate will be checked. Your temperature may also be checked. Blood and urine tests  may be done. Blood tests may be done to check your cholesterol levels. Abnormal cholesterol levels increase your risk for heart disease and stroke. You may also need a blood or urine test to check for diabetes if you are at increased risk. Urine tests may be done to look for signs of an infection or kidney disease. A physical exam  includes checking your heartbeat and lungs with a stethoscope. Your healthcare provider may also check your skin to look for sun damage. Screening tests  may be recommended. A screening test is done to check for diseases that may not cause symptoms. The screening tests you may need depend on your age, gender, family history, and lifestyle habits. For example, colorectal screening may be recommended if you are 48years old or older. Screening tests you need if you are a woman:   A Pap smear  is used to screen for cervical cancer.  Pap smears are usually done every 3 to 5 years depending on your age. You may need them more often if you have had abnormal Pap smear test results in the past. Ask your healthcare provider how often you should have a Pap smear. A mammogram  is an x-ray of your breasts to screen for breast cancer. Experts recommend mammograms every 2 years starting at age 48 years. You may need a mammogram at age 52 years or younger if you have an increased risk for breast cancer. Talk to your healthcare provider about when you should start having mammograms and how often you need them. Vaccines you may need:   Get an influenza vaccine  every year. The influenza vaccine protects you from the flu. Several types of viruses cause the flu. The viruses change over time, so new vaccines are made each year. Get a tetanus-diphtheria (Td) booster vaccine  every 10 years. This vaccine protects you against tetanus and diphtheria. Tetanus is a severe infection that may cause painful muscle spasms and lockjaw. Diphtheria is a severe bacterial infection that causes a thick covering in the back of your mouth and throat. Get a human papillomavirus (HPV) vaccine  if you are female and aged 23 to 32 or male 23 to 24 and never received it. This vaccine protects you from HPV infection. HPV is the most common infection spread by sexual contact. HPV may also cause vaginal, penile, and anal cancers. Get a pneumococcal vaccine  if you are aged 72 years or older. The pneumococcal vaccine is an injection given to protect you from pneumococcal disease. Pneumococcal disease is an infection caused by pneumococcal bacteria. The infection may cause pneumonia, meningitis, or an ear infection. Get a shingles vaccine  if you are 60 or older, even if you have had shingles before. The shingles vaccine is an injection to protect you from the varicella-zoster virus. This is the same virus that causes chickenpox.  Shingles is a painful rash that develops in people who had chickenpox or have been exposed to the virus. How to eat healthy:  My Plate is a model for planning healthy meals. It shows the types and amounts of foods that should go on your plate. Fruits and vegetables make up about half of your plate, and grains and protein make up the other half. A serving of dairy is included on the side of your plate. The amount of calories and serving sizes you need depends on your age, gender, weight, and height. Examples of healthy foods are listed below:  Eat a variety of vegetables  such as dark green, red, and orange vegetables. You can also include canned vegetables low in sodium (salt) and frozen vegetables without added butter or sauces. Eat a variety of fresh fruits , canned fruit in 100% juice, frozen fruit, and dried fruit. Include whole grains. At least half of the grains you eat should be whole grains. Examples include whole-wheat bread, wheat pasta, brown rice, and whole-grain cereals such as oatmeal.    Eat a variety of protein foods such as seafood (fish and shellfish), lean meat, and poultry without skin (turkey and chicken). Examples of lean meats include pork leg, shoulder, or tenderloin, and beef round, sirloin, tenderloin, and extra lean ground beef. Other protein foods include eggs and egg substitutes, beans, peas, soy products, nuts, and seeds. Choose low-fat dairy products such as skim or 1% milk or low-fat yogurt, cheese, and cottage cheese. Limit unhealthy fats  such as butter, hard margarine, and shortening. Exercise:  Exercise at least 30 minutes per day on most days of the week. Some examples of exercise include walking, biking, dancing, and swimming. You can also fit in more physical activity by taking the stairs instead of the elevator or parking farther away from stores. Include muscle strengthening activities 2 days each week. Regular exercise provides many health benefits.  It helps you manage your weight, and decreases your risk for type 2 diabetes, heart disease, stroke, and high blood pressure. Exercise can also help improve your mood. Ask your healthcare provider about the best exercise plan for you. General health and safety guidelines:   Do not smoke. Nicotine and other chemicals in cigarettes and cigars can cause lung damage. Ask your healthcare provider for information if you currently smoke and need help to quit. E-cigarettes or smokeless tobacco still contain nicotine. Talk to your healthcare provider before you use these products. Limit alcohol. A drink of alcohol is 12 ounces of beer, 5 ounces of wine, or 1½ ounces of liquor. Lose weight, if needed. Being overweight increases your risk of certain health conditions. These include heart disease, high blood pressure, type 2 diabetes, and certain types of cancer. Protect your skin. Do not sunbathe or use tanning beds. Use sunscreen with a SPF 15 or higher. Apply sunscreen at least 15 minutes before you go outside. Reapply sunscreen every 2 hours. Wear protective clothing, hats, and sunglasses when you are outside. Drive safely. Always wear your seatbelt. Make sure everyone in your car wears a seatbelt. A seatbelt can save your life if you are in an accident. Do not use your cell phone when you are driving. This could distract you and cause an accident. Pull over if you need to make a call or send a text message. Practice safe sex. Use latex condoms if are sexually active and have more than one partner. Your healthcare provider may recommend screening tests for sexually transmitted infections (STIs). Wear helmets, lifejackets, and protective gear. Always wear a helmet when you ride a bike or motorcycle, go skiing, or play sports that could cause a head injury. Wear protective equipment when you play sports. Wear a lifejacket when you are on a boat or doing water sports.     © Copyright [facility] 2023 Information is for End User's use only and may not be sold, redistributed or otherwise used for commercial purposes. The above information is an  only. It is not intended as medical advice for individual conditions or treatments. Talk to your doctor, nurse or pharmacist before following any medical regimen to see if it is safe and effective for you.

## 2023-12-04 NOTE — PROGRESS NOTES
ADULT ANNUAL [facility]    NAME: Colletta Berthold  AGE: 61 y.o. SEX: male  : 1960     DATE: 2023     Assessment and Plan:     Problem List Items Addressed This Visit          Digestive    GERD without esophagitis - Primary    Relevant Medications    famotidine (PEPCID) 40 MG tablet       Other    Depression    Relevant Medications    Magnesium Bisglycinate 100 MG TABS    Other Relevant Orders    TSH, 3rd generation with Free T4 reflex    Squamous cell carcinomatosis ([address])     Other Visit Diagnoses       Annual physical exam        BMI 34.0-34.9,adult        Elevated blood sugar        Relevant Orders    Comprehensive metabolic panel    Hemoglobin A1C    Screening for iron deficiency anemia        Relevant Orders    CBC    Screening for cholesterol level        Relevant Orders    Lipid Panel with Direct LDL reflex            Immunizations and preventive care screenings were discussed with patient today. Appropriate education was printed on patient's after visit summary. Discussed risks and benefits of prostate cancer screening. We discussed the controversial history of PSA screening for prostate cancer in the [facility] as well as the risk of over detection and over treatment of prostate cancer by way of PSA screening. The patient understands that PSA blood testing is an imperfect way to screen for prostate cancer and that elevated PSA levels in the blood may also be caused by infection, inflammation, prostatic trauma or manipulation, urological procedures, or by benign prostatic enlargement. The role of the digital rectal examination in prostate cancer screening was also discussed and I discussed with him that there is large interobserver variability in the findings of digital rectal examination.     Counseling:  Alcohol/drug use: discussed moderation in alcohol intake, the recommendations for healthy alcohol use, and avoidance of illicit drug use. Dental Health: discussed importance of regular tooth brushing, flossing, and dental visits. Injury prevention: discussed safety/seat belts, safety helmets, smoke detectors, carbon dioxide detectors, and smoking near bedding or upholstery. Sexual health: discussed sexually transmitted diseases, partner selection, use of condoms, avoidance of unintended pregnancy, and contraceptive alternatives. Exercise: the importance of regular exercise/physical activity was discussed. Recommend exercise 3-5 times per week for at least 30 minutes. Return in 6 months (on 6/4/2024). Chief Complaint:     Chief Complaint   Patient presents with    Physical Exam      History of Present Illness:     Adult Annual Physical   Patient here for a comprehensive physical exam. The patient reports problems - GERD as well as depression . Diet and Physical Activity  Diet/Nutrition: well balanced diet. Exercise: walking. Depression Screening  PHQ-2/9 Depression Screening           General Health  Sleep: sleeps poorly. Hearing: normal - bilateral.  Vision: no vision problems. Dental: regular dental visits.  Health  Symptoms include: none         Review of Systems:     Review of Systems   Constitutional:  Negative for activity change, chills, fatigue and fever. HENT:  Negative for congestion, ear pain, sinus pressure and sore throat. Eyes:  Negative for redness, itching and visual disturbance. Respiratory:  Negative for cough and shortness of breath. Cardiovascular:  Negative for chest pain and palpitations. Gastrointestinal:  Negative for abdominal pain, diarrhea and nausea. Endocrine: Negative for cold intolerance and heat intolerance. Genitourinary:  Negative for dysuria, flank pain and frequency. Musculoskeletal:  Negative for arthralgias, back pain, gait problem and myalgias. Skin:  Negative for color change.    Allergic/Immunologic: Negative for environmental allergies. Neurological:  Negative for dizziness, numbness and headaches. Psychiatric/Behavioral:  Negative for behavioral problems and sleep disturbance. Past Medical History:     Past Medical History:   Diagnosis Date    Arthritis     OA right knee    Cancer ([address])     SCC cheek in past    Deep vein thrombosis (DVT) ([address])     Hearing loss     hearing aide denis    History of DVT of lower extremity     RLE    Pulmonary embolism ([address]) 1994    Wears glasses     reading      Past Surgical History:     Past Surgical History:   Procedure Laterality Date    COLONOSCOPY      COLONOSCOPY N/A 5/11/2016    Procedure: COLONOSCOPY;  Surgeon: Raf Humphrey MD;  Location: [facility];   Service:     CYST REMOVAL      back    KNEE ARTHROSCOPY Bilateral     KNEE SURGERY Right     SKIN CANCER EXCISION      face, MARU procedure    TOTAL KNEE ARTHROPLASTY Right 12/5/2017    Procedure: ARTHROPLASTY KNEE TOTAL;  Surgeon: Thomas Meza MD;  Location: [facility] OR;  Service: Orthopedics      Family History:     Family History   Problem Relation Age of Onset    Alzheimer's disease Mother         other onset without behavioral disturbance    Heart failure Father         acute CHF    Breast cancer Sister     Leukemia Brother     Diabetes Paternal Grandmother     Cancer Paternal Grandfather       Social History:     Social History     Socioeconomic History    Marital status: /Civil Union     Spouse name: None    Number of children: None    Years of education: None    Highest education level: None   Occupational History    None   Tobacco Use    Smoking status: Some Days     Types: Cigars    Smokeless tobacco: Never    Tobacco comments:     occ cigar   Vaping Use    Vaping Use: Never used   Substance and Sexual Activity    Alcohol use: No    Drug use: No    Sexual activity: None   Other Topics Concern    None   Social History Narrative    Always uses a seatbelt    No guns in the home     Social Determinants of Health Financial Resource Strain: Not on file   Food Insecurity: Not on file   Transportation Needs: Not on file   Physical Activity: Not on file   Stress: Not on file   Social Connections: Not on file   Intimate Partner Violence: Not on file   Housing Stability: Not on file      Current Medications:     Current Outpatient Medications   Medication Sig Dispense Refill    famotidine (PEPCID) 40 MG tablet 40 mg 2 (two) times a day as needed      fexofenadine (ALLEGRA) 60 MG tablet Take by mouth      Magnesium Bisglycinate 100 MG TABS Take 300 mg by mouth daily at bedtime 90 tablet 0    valACYclovir (VALTREX) 1,000 mg tablet TAKE 2 TABLETS AT ONSET THEN 2 TABLETS AFTER 12 HOURS       Current Facility-Administered Medications   Medication Dose Route Frequency Provider Last Rate Last Admin    diphenhydrAMINE (BENADRYL) oral liquid 50 mg  50 mg Oral Q6H PRN Darral Lee Vining., PA-C   50 mg at 08/02/21 2125      Allergies: Allergies   Allergen Reactions    Celecoxib     Peanut Oil - Food Allergy     Nuts - Food Allergy Hives    Soybean-Containing Drug Products - Food Allergy Hives    Wheat Bran - Food Allergy Hives      Physical Exam:     BP 98/76   Pulse 68   Temp 97.5 °F (36.4 °C)   Ht 5' 10.5" (1.791 m)   Wt 109 kg (241 lb 6.4 oz)   SpO2 98%   BMI 34.15 kg/m²     Physical Exam  Vitals reviewed. Constitutional:       General: He is not in acute distress. Appearance: He is well-developed. He is not diaphoretic. HENT:      Head: Normocephalic and atraumatic. No right periorbital erythema or left periorbital erythema. Right Ear: Tympanic membrane normal. No decreased hearing noted. Left Ear: Tympanic membrane normal. No decreased hearing noted. Nose: Nose normal.      Right Sinus: No maxillary sinus tenderness or frontal sinus tenderness. Left Sinus: No maxillary sinus tenderness or frontal sinus tenderness. Mouth/Throat:      Lips: Pink.       Mouth: Mucous membranes are moist. Pharynx: No oropharyngeal exudate. Tonsils: No tonsillar exudate or tonsillar abscesses. Eyes:      General: Lids are normal.      Extraocular Movements: Extraocular movements intact. Conjunctiva/sclera: Conjunctivae normal.      Pupils: Pupils are equal, round, and reactive to light. Neck:      Thyroid: No thyroid mass. Trachea: Trachea normal.   Cardiovascular:      Rate and Rhythm: Normal rate and regular rhythm. Pulses: Normal pulses. Heart sounds: Normal heart sounds. No murmur heard. No friction rub. No gallop. Pulmonary:      Effort: Pulmonary effort is normal. No respiratory distress. Breath sounds: Normal breath sounds. No wheezing or rales. Abdominal:      General: Bowel sounds are normal. There is no distension. Palpations: Abdomen is soft. There is no mass. Tenderness: There is no abdominal tenderness. There is no guarding. Musculoskeletal:         General: Normal range of motion. Cervical back: Normal range of motion and neck supple. Skin:     General: Skin is warm and dry. Coloration: Skin is not pale. Findings: No erythema or rash. Neurological:      Mental Status: He is alert and oriented to person, place, and time. Cranial Nerves: No cranial nerve deficit. Coordination: Coordination normal.   Psychiatric:         Attention and Perception: Attention and perception normal.         Mood and Affect: Mood and affect normal.         Speech: Speech normal.         Behavior: Behavior normal.         Thought Content:  Thought content normal.         Cognition and Memory: Cognition and memory normal.         Judgment: Judgment normal.          DO EMMANUEL Nascimento [employer] [id number] [employer]

## 2023-12-05 LAB
ALBUMIN SERPL-MCNC: 4.3 G/DL (ref 3.6–5.1)
ALBUMIN/GLOB SERPL: 1.7 (CALC) (ref 1–2.5)
ALP SERPL-CCNC: 79 U/L (ref 35–144)
ALT SERPL-CCNC: 15 U/L (ref 9–46)
AST SERPL-CCNC: 14 U/L (ref 10–35)
BASOPHILS # BLD AUTO: 52 CELLS/UL (ref 0–200)
BASOPHILS NFR BLD AUTO: 0.8 %
BILIRUB SERPL-MCNC: 0.7 MG/DL (ref 0.2–1.2)
BUN SERPL-MCNC: 15 MG/DL (ref 7–25)
BUN/CREAT SERPL: NORMAL (CALC) (ref 6–22)
CALCIUM SERPL-MCNC: 9.6 MG/DL (ref 8.6–10.3)
CHLORIDE SERPL-SCNC: 105 MMOL/L (ref 98–110)
CHOLEST SERPL-MCNC: 204 MG/DL
CHOLEST/HDLC SERPL: 3.4 (CALC)
CO2 SERPL-SCNC: 29 MMOL/L (ref 20–32)
CREAT SERPL-MCNC: 1.03 MG/DL (ref 0.7–1.35)
EOSINOPHIL # BLD AUTO: 150 CELLS/UL (ref 15–500)
EOSINOPHIL NFR BLD AUTO: 2.3 %
ERYTHROCYTE [DISTWIDTH] IN BLOOD BY AUTOMATED COUNT: 13 % (ref 11–15)
GFR/BSA.PRED SERPLBLD CYS-BASED-ARV: 82 ML/MIN/1.73M2
GLOBULIN SER CALC-MCNC: 2.5 G/DL (CALC) (ref 1.9–3.7)
GLUCOSE SERPL-MCNC: 93 MG/DL (ref 65–99)
HBA1C MFR BLD: 5.4 % OF TOTAL HGB
HCT VFR BLD AUTO: 44.7 % (ref 38.5–50)
HDLC SERPL-MCNC: 60 MG/DL
HGB BLD-MCNC: 15 G/DL (ref 13.2–17.1)
LDLC SERPL CALC-MCNC: 128 MG/DL (CALC)
LYMPHOCYTES # BLD AUTO: 1216 CELLS/UL (ref 850–3900)
LYMPHOCYTES NFR BLD AUTO: 18.7 %
MCH RBC QN AUTO: 30.4 PG (ref 27–33)
MCHC RBC AUTO-ENTMCNC: 33.6 G/DL (ref 32–36)
MCV RBC AUTO: 90.5 FL (ref 80–100)
MONOCYTES # BLD AUTO: 507 CELLS/UL (ref 200–950)
MONOCYTES NFR BLD AUTO: 7.8 %
NEUTROPHILS # BLD AUTO: 4576 CELLS/UL (ref 1500–7800)
NEUTROPHILS NFR BLD AUTO: 70.4 %
NONHDLC SERPL-MCNC: 144 MG/DL (CALC)
PLATELET # BLD AUTO: 216 THOUSAND/UL (ref 140–400)
PMV BLD REES-ECKER: 11.7 FL (ref 7.5–12.5)
POTASSIUM SERPL-SCNC: 4.8 MMOL/L (ref 3.5–5.3)
PROT SERPL-MCNC: 6.8 G/DL (ref 6.1–8.1)
RBC # BLD AUTO: 4.94 MILLION/UL (ref 4.2–5.8)
SODIUM SERPL-SCNC: 140 MMOL/L (ref 135–146)
TRIGL SERPL-MCNC: 67 MG/DL
TSH SERPL-ACNC: 2.05 MIU/L (ref 0.4–4.5)
WBC # BLD AUTO: 6.5 THOUSAND/UL (ref 3.8–10.8)

## 2024-03-11 ENCOUNTER — OFFICE VISIT (OUTPATIENT)
Dept: URGENT CARE | Facility: CLINIC | Age: 64
End: 2024-03-11
Payer: COMMERCIAL

## 2024-03-11 VITALS
RESPIRATION RATE: 18 BRPM | TEMPERATURE: 96.4 F | DIASTOLIC BLOOD PRESSURE: 66 MMHG | BODY MASS INDEX: 34.52 KG/M2 | SYSTOLIC BLOOD PRESSURE: 118 MMHG | WEIGHT: 244 LBS | OXYGEN SATURATION: 97 % | HEART RATE: 72 BPM

## 2024-03-11 DIAGNOSIS — J06.9 VIRAL URI: ICD-10-CM

## 2024-03-11 DIAGNOSIS — J02.9 SORE THROAT: Primary | ICD-10-CM

## 2024-03-11 LAB — S PYO AG THROAT QL: NEGATIVE

## 2024-03-11 PROCEDURE — 87880 STREP A ASSAY W/OPTIC: CPT | Performed by: NURSE PRACTITIONER

## 2024-03-11 PROCEDURE — S9083 URGENT CARE CENTER GLOBAL: HCPCS | Performed by: NURSE PRACTITIONER

## 2024-03-11 PROCEDURE — G0382 LEV 3 HOSP TYPE B ED VISIT: HCPCS | Performed by: NURSE PRACTITIONER

## 2024-03-11 RX ORDER — PREDNISONE 20 MG/1
20 TABLET ORAL 2 TIMES DAILY WITH MEALS
Qty: 10 TABLET | Refills: 0 | Status: SHIPPED | OUTPATIENT
Start: 2024-03-11 | End: 2024-03-16

## 2024-03-11 NOTE — PROGRESS NOTES
[facility]        NAME: Jethro Villanueva is a 63 y.o. male  : 1960    MRN: [id number]  DATE: 2024  TIME: 2:23 PM    Assessment and Plan   Sore throat [J02.9]  1. Sore throat  POCT rapid ANTIGEN strepA    predniSONE 20 mg tablet      2. Viral URI          Rapid strep negative   Declines covid testing   Will start course of prednisone for inflammation/symptom reduction of presumed covid   Cepacol for sore throat/sores   Pt in agreement with plan       Patient Instructions     Follow up with PCP in 3-5 days.  Proceed to  ER if symptoms worsen.    Chief Complaint     Chief Complaint   Patient presents with    Cold Like Symptoms     Patient with c/o head congestion,cough, runny nose and sore throat for 5 days.  He just returned from [place] from playing golf.  His emery tested positive for COVID         History of Present Illness   Jethro Villanueva presents to the clinic c/o    Cold Like Symptoms (Patient with c/o head congestion,cough, runny nose and sore throat for 5 days.  He just returned from [place] from playing golf.  His emery tested positive for COVID)  He has not tested for covid -   States he snores at night and that makes his throat worse and sleeping difficult.  Noticed a white spot in the back of his throat today so watned to get it checked.    Sore Throat   This is a new problem. The current episode started in the past 7 days. The problem has been unchanged. Neither side of throat is experiencing more pain than the other. There has been no fever. The pain is at a severity of 8/10. Associated symptoms include congestion, coughing, a hoarse voice, a plugged ear sensation and trouble swallowing. Pertinent negatives include no abdominal pain, diarrhea, drooling, ear discharge, ear pain, headaches, neck pain, shortness of breath, stridor, swollen glands or vomiting. He has had no exposure to strep or mono.       Review of Systems   Review of Systems   HENT:  Positive for congestion,  hoarse voice, sore throat and trouble swallowing. Negative for drooling, ear discharge and ear pain.    Respiratory:  Positive for cough. Negative for shortness of breath and stridor.    Gastrointestinal:  Negative for abdominal pain, diarrhea and vomiting.   Musculoskeletal:  Negative for neck pain.   Neurological:  Negative for headaches.   All other systems reviewed and are negative.        Current Medications     Long-Term Medications   Medication Sig Dispense Refill    valACYclovir (VALTREX) 1,000 mg tablet TAKE 2 TABLETS AT ONSET THEN 2 TABLETS AFTER 12 HOURS (Patient not taking: Reported on 3/11/2024)         Current Allergies     Allergies as of 03/11/2024 - Reviewed 03/11/2024   Allergen Reaction Noted    Celecoxib  01/19/2018    Peanut oil - food allergy  10/30/2019    Nuts - food allergy Hives 02/16/2016    Soybean-containing drug products - food allergy Hives 02/16/2016    Wheat bran - food allergy Hives 02/16/2016            The following portions of the patient's history were reviewed and updated as appropriate: allergies, current medications, past family history, past medical history, past social history, past surgical history and problem list.    Objective   /66   Pulse 72   Temp (!) 96.4 °F (35.8 °C) (Tympanic)   Resp 18   Wt 111 kg (244 lb)   SpO2 97%   BMI 34.52 kg/m²        Physical Exam     Physical Exam  Vitals and nursing note reviewed.   Constitutional:       Appearance: Normal appearance. He is well-developed.   HENT:      Head: Normocephalic and atraumatic.      Right Ear: Hearing, tympanic membrane, ear canal and external ear normal.      Left Ear: Hearing, tympanic membrane, ear canal and external ear normal.      Nose: Mucosal edema and congestion present.      Right Sinus: No maxillary sinus tenderness or frontal sinus tenderness.      Left Sinus: No maxillary sinus tenderness or frontal sinus tenderness.      Mouth/Throat:      Mouth: Mucous membranes are moist.       Pharynx: Oropharynx is clear. Posterior oropharyngeal erythema present.     Eyes:      General: Lids are normal.      Conjunctiva/sclera: Conjunctivae normal.      Pupils: Pupils are equal, round, and reactive to light.   Cardiovascular:      Rate and Rhythm: Normal rate and regular rhythm.      Heart sounds: Normal heart sounds, S1 normal and S2 normal.   Pulmonary:      Effort: Pulmonary effort is normal.      Breath sounds: Normal breath sounds.   Skin:     General: Skin is warm and dry.   Neurological:      Mental Status: He is alert.   Psychiatric:         Speech: Speech normal.         Behavior: Behavior normal.         Thought Content: Thought content normal.         Judgment: Judgment normal.

## 2024-03-11 NOTE — PATIENT INSTRUCTIONS
Recommend cepacol lozengers for sore throat and mouth sores  Take prednisone to help decrease throat pain/inflammation    COVID-19 (Coronavirus Disease 2019)   WHAT YOU NEED TO KNOW:   COVID-19 is the disease caused by a coronavirus first discovered in December 2019. The virus can be spread starting 2 to 3 days before symptoms begin. The virus has changed into several new forms (called variants) since it was discovered. A variant may be more easily spread or cause more severe illness than the original form.  DISCHARGE INSTRUCTIONS:   Call your local emergency number (911 in the ) if:   You have trouble breathing or shortness of breath at rest.    You have chest pain or pressure that lasts longer than 5 minutes.    You become confused or hard to wake.    Return to the emergency department if:   The skin on your face, fingers, or toes look blue or darker than usual.      Call your doctor if:   You have a fever.    You have questions or concerns about your condition or care.    Medicines:  You may need any of the following:  Decongestants  help reduce nasal congestion and help you breathe more easily. If you take decongestant pills, they may make you feel restless or cause problems with your sleep. Do not use decongestant sprays for more than a few days.    Cough suppressants  help reduce coughing. Ask your healthcare provider which type of cough medicine is best for you.    Acetaminophen  decreases pain and fever. It is available without a doctor's order. Ask how much to take and how often to take it. Follow directions. Read the labels of all other medicines you are using to see if they also contain acetaminophen, or ask your doctor or pharmacist. Acetaminophen can cause liver damage if not taken correctly.    NSAIDs , such as ibuprofen, help decrease swelling, pain, and fever. This medicine is available with or without a doctor's order. NSAIDs can cause stomach bleeding or kidney problems in certain people. If you  take blood thinner medicine, always ask your healthcare provider if NSAIDs are safe for you. Always read the medicine label and follow directions.    Blood thinners  help prevent blood clots. Clots can cause strokes, heart attacks, and death. Many types of blood thinners are available. Your healthcare provider will give you specific instructions for the type you are given. The following are general safety guidelines to follow while you are taking a blood thinner:    Watch for bleeding and bruising. Watch for bleeding from your gums or nose. Watch for blood in your urine and bowel movements. Use a soft washcloth on your skin, and a soft toothbrush to brush your teeth. This can keep your skin and gums from bleeding. If you shave, use an electric shaver. Do not play contact sports.    Tell your dentist and other healthcare providers that you take a blood thinner. Wear a bracelet or necklace that says you take this medicine.    Do not start or stop any other medicines or supplements unless your healthcare provider tells you to. Many medicines and supplements cannot be used with blood thinners.    Take your blood thinner exactly as prescribed by your healthcare provider. Do not skip does or take less than prescribed. Tell your provider right away if you forget to take your blood thinner, or if you take too much.    Take your medicine as directed.  Contact your healthcare provider if you think your medicine is not helping or if you have side effects. Tell your provider if you are allergic to any medicine. Keep a list of the medicines, vitamins, and herbs you take. Include the amounts, and when and why you take them. Bring the list or the pill bottles to follow-up visits. Carry your medicine list with you in case of an emergency.    What you need to know about COVID-19 vaccines:  Healthcare providers recommend vaccination, even if you already had COVID-19.  Get a COVID-19 vaccine as directed.  Vaccination is recommended  for everyone 6 months or older. COVID-19 vaccines are given as a shot in 1 to 3 doses as a primary series. This depends on the vaccine brand and the age of the person who receives it. A booster dose is recommended for everyone 5 years or older after the primary series is complete. A second booster  is recommended for all adults 50 or older and for immunocompromised adolescents. The second booster is also recommended for anyone who got the 1-dose brand of vaccine for the first dose and a booster. Your provider can give you more information on boosters and help you schedule all needed doses.         Continue to protect yourself and others.  You can become infected even after you get the vaccine. You may also be able to pass the virus to others without knowing you are infected.    After you get the vaccine, check local, national, and international travel rules.  You may need to be tested before you travel. Some countries require proof of a negative test before you travel. You may also need to quarantine after you return.    Medicine may be given to prevent infection.  The medicine can be given if you are at high risk for infection and cannot get the vaccine. It can also be given if your immune system does not respond well to the vaccine.    How the 2019 coronavirus spreads:  Close personal contact with an infected person increases your risk for infection. This means being within 6 feet (2 meters) of the person for at least 15 minutes over 24 hours.  The virus travels in droplets that form when a person talks, sings, coughs, or sneezes.  The droplets can also float in the air for minutes or hours. Infection happens when you breathe in the droplets or get them in your eyes or nose.    Person-to-person contact can spread the virus.  For example, a person with the virus on his or her hands can spread it by shaking hands with someone.    The virus can stay on objects and surfaces for hours to days.  You may become infected  by touching the object or surface and then touching your eyes or mouth.    Help lower your risk for COVID-19 during an active outbreak:   Stay home if you are sick or think you may have COVID-19.  It is important to stay home if you are waiting for a testing appointment or for test results.    Wash your hands often throughout the day.  Use soap and water. Rub your soapy hands together, lacing your fingers, for at least 20 seconds. Rinse with warm, running water. Dry your hands with a clean towel or paper towel. Use hand  that contains alcohol if soap and water are not available. Teach children how to wash their hands and use hand .         Cover sneezes and coughs.  Turn your face away and cover your mouth and nose with a tissue. Throw the tissue away. Use the bend of your arm if a tissue is not available. Then wash your hands well with soap and water or use hand . Teach children how to cover a cough or sneeze.    Wear a face covering (mask) when needed.  Use a cloth covering with at least 2 layers. You can also create layers by putting a cloth covering over a disposable non-medical mask. Cover your mouth and your nose.         Try to keep space between you and others when you are out of the house.  Avoid crowds as much as possible. Wear a face covering when you must be around a large group and cannot keep space between you and others.    Clean and disinfect high-touch surfaces and objects often.  Use disinfecting wipes, or make a solution of 4 teaspoons of bleach in 1 quart (4 cups) of water.    Ask about other vaccines you may need.  Get the influenza (flu) vaccine as soon as recommended each year, usually starting in September or October. Get the pneumonia vaccine if recommended. Your healthcare provider can tell you if you should also get other vaccines, and when to get them.       Follow up with your doctor as directed:  Write down your questions so you remember to ask them during  your visits.  For more information:   Centers for Disease Control and Prevention  [address] [place] [address]  Phone: 8- 988 - 132-6636  Web Address: http://www.cdc.gov    © Copyright [employer] 2023 Information is for End User's use only and may not be sold, redistributed or otherwise used for commercial purposes.  The above information is an  only. It is not intended as medical advice for individual conditions or treatments. Talk to your doctor, nurse or pharmacist before following any medical regimen to see if it is safe and effective for you.

## 2024-05-06 ENCOUNTER — TELEPHONE (OUTPATIENT)
Dept: FAMILY MEDICINE CLINIC | Facility: CLINIC | Age: 64
End: 2024-05-06

## 2024-11-08 ENCOUNTER — APPOINTMENT (OUTPATIENT)
Dept: RADIOLOGY | Facility: CLINIC | Age: 64
End: 2024-11-08
Payer: COMMERCIAL

## 2024-11-08 ENCOUNTER — OFFICE VISIT (OUTPATIENT)
Dept: URGENT CARE | Facility: CLINIC | Age: 64
End: 2024-11-08
Payer: COMMERCIAL

## 2024-11-08 VITALS
DIASTOLIC BLOOD PRESSURE: 86 MMHG | HEART RATE: 68 BPM | OXYGEN SATURATION: 97 % | SYSTOLIC BLOOD PRESSURE: 134 MMHG | RESPIRATION RATE: 18 BRPM | TEMPERATURE: 97.4 F

## 2024-11-08 DIAGNOSIS — R05.9 COUGH, UNSPECIFIED TYPE: ICD-10-CM

## 2024-11-08 DIAGNOSIS — J01.90 ACUTE SINUSITIS, RECURRENCE NOT SPECIFIED, UNSPECIFIED LOCATION: Primary | ICD-10-CM

## 2024-11-08 PROCEDURE — 71046 X-RAY EXAM CHEST 2 VIEWS: CPT

## 2024-11-08 PROCEDURE — G0383 LEV 4 HOSP TYPE B ED VISIT: HCPCS | Performed by: PHYSICIAN ASSISTANT

## 2024-11-08 PROCEDURE — S9083 URGENT CARE CENTER GLOBAL: HCPCS | Performed by: PHYSICIAN ASSISTANT

## 2024-11-08 RX ORDER — AZITHROMYCIN 250 MG/1
TABLET, FILM COATED ORAL
Qty: 6 TABLET | Refills: 0 | Status: SHIPPED | OUTPATIENT
Start: 2024-11-08 | End: 2024-11-13

## 2024-11-08 RX ORDER — PREDNISONE 20 MG/1
TABLET ORAL
Qty: 10 TABLET | Refills: 0 | Status: SHIPPED | OUTPATIENT
Start: 2024-11-08

## 2024-11-08 RX ORDER — DEXTROMETHORPHAN HYDROBROMIDE AND PROMETHAZINE HYDROCHLORIDE 15; 6.25 MG/5ML; MG/5ML
SYRUP ORAL
Qty: 120 ML | Refills: 0 | Status: SHIPPED | OUTPATIENT
Start: 2024-11-08

## 2024-11-08 NOTE — PROGRESS NOTES
[facility]    NAME: Jethro Villanueva is a 64 y.o. male  : 1960    MRN: [id number]  DATE: 2024  TIME: 1:42 PM    Assessment and Plan   Acute sinusitis, recurrence not specified, unspecified location [J01.90]  1. Acute sinusitis, recurrence not specified, unspecified location  predniSONE 20 mg tablet    promethazine-dextromethorphan (PHENERGAN-DM) 6.25-15 mg/5 mL oral syrup    azithromycin (ZITHROMAX) 250 mg tablet      2. Cough, unspecified type  XR chest pa and lateral        Chest x-ray: No obvious acute cardiopulmonary infiltrates when compared to prior films 2017.  Initial interpretation reviewed with patient.  Await radiologist final test results.    Patient Instructions     Patient Instructions   Take antibiotic as instructed.  Take prednisone as instructed.  Prescription cough medicine for home use only.  Do not take with any other potential sedating products.    They benefit from using Flonase nightly for the next 2 to 3 weeks.    Follow-up with primary care if not improving over the next 7 to 10 days.    Chief Complaint     Chief Complaint   Patient presents with    Cold Like Symptoms     Patient has had a cough, congestion and post nasal drip for the last week and a half       History of Present Illness   Jethro Villanueva presents to the clinic c/o  64-year-old male comes in with cough, chest congestion and runny nose.    Started: Over 10 days ago.  Associated signs and symptoms: Cough, congestion, postnasal drip.  Green phlegm earlier in day that clears.    Modifying factors:  tylenol cold and flu.  Guaifenesin.  Known Exposures:  none known.  Recent travel:  [place].    Hx asthma:  No.  Hx pneumonia:  No.  Smoker: Cigar smoker.      Wife wants him to get a chest x-ray today.  He has been under a lot of stress lately and has had decreased sleep due to racing thoughts and cough.  He and his wife are moving to [place] in December.  Daughter will be staying here.  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    Patient also says he has had some friends with esophageal cancer and he worries about that.    Cough  This is a recurrent problem. The current episode started in the past 7 days. The problem has been unchanged. The problem occurs every few hours. The cough is Non-productive, productive of sputum and productive of purulent sputum. Associated symptoms include nasal congestion, postnasal drip and rhinorrhea. Pertinent negatives include no chest pain, chills, ear congestion, ear pain, fever, headaches, heartburn, hemoptysis, myalgias, rash, sore throat, shortness of breath, sweats, weight loss or wheezing. The symptoms are aggravated by lying down. Risk factors for lung disease include animal exposure.       Review of Systems   Review of Systems   Constitutional:  Positive for fatigue. Negative for activity change, appetite change, chills, diaphoresis, fever, unexpected weight change and weight loss.   HENT:  Positive for postnasal drip and rhinorrhea. Negative for congestion, ear pain and sore throat.    Respiratory:  Positive for cough. Negative for hemoptysis, shortness of breath and wheezing.    Cardiovascular:  Negative for chest pain.   Gastrointestinal:  Negative for heartburn.   Musculoskeletal:  Negative for myalgias.   Skin:  Negative for rash.   Neurological:  Negative for headaches.       Current Medications     Long-Term Medications   Medication Sig Dispense Refill    valACYclovir (VALTREX) 1,000 mg tablet TAKE 2 TABLETS AT ONSET THEN 2 TABLETS AFTER 12 HOURS (Patient not taking: Reported on 3/11/2024)         Current Allergies     Allergies as of 11/08/2024 - Reviewed 11/08/2024   Allergen Reaction Noted    Celecoxib  01/19/2018    Peanut oil - food allergy  10/30/2019    Nuts - food allergy Hives 02/16/2016    Soybean-containing drug products - food allergy Hives 02/16/2016    Wheat  bran - food allergy Hives 02/16/2016          The following portions of the patient's history were reviewed and updated as appropriate: allergies, current medications, past family history, past medical history, past social history, past surgical history and problem list.  Past Medical History:   Diagnosis Date    Arthritis     OA right knee    Cancer (HCC)     SCC cheek in past    Deep vein thrombosis (DVT) (HCC)     Hearing loss     hearing aide denis    History of DVT of lower extremity     RLE    Pulmonary embolism (HCC) 1994    Wears glasses     reading     Past Surgical History:   Procedure Laterality Date    COLONOSCOPY      COLONOSCOPY N/A 5/11/2016    Procedure: COLONOSCOPY;  Surgeon: Johny Nelson MD;  Location: [facility] GI LAB;  Service:     CYST REMOVAL      back    KNEE ARTHROSCOPY Bilateral     KNEE SURGERY Right     SKIN CANCER EXCISION      face, MARU procedure    TOTAL KNEE ARTHROPLASTY Right 12/5/2017    Procedure: ARTHROPLASTY KNEE TOTAL;  Surgeon: Ric Bhatia MD;  Location: [facility] OR;  Service: Orthopedics     Family History   Problem Relation Age of Onset    Alzheimer's disease Mother         other onset without behavioral disturbance    Heart failure Father         acute CHF    Breast cancer Sister     Leukemia Brother     Diabetes Paternal Grandmother     Cancer Paternal Grandfather        Objective   /86   Pulse 68   Temp (!) 97.4 °F (36.3 °C)   Resp 18   SpO2 97%   No LMP for male patient.       Physical Exam     Physical Exam  Vitals and nursing note reviewed.   Constitutional:       General: He is not in acute distress.     Appearance: He is well-developed. He is not ill-appearing, toxic-appearing or diaphoretic.      Comments: No trismus or conversational dyspnea.  Appears fatigued but in no acute distress.  Occasional dry cough.  Frequent throat clearing.   HENT:      Head: Normocephalic and atraumatic.      Right Ear: Tympanic membrane, ear canal and external ear normal.       Left Ear: Tympanic membrane, ear canal and external ear normal.      Nose: Congestion present. No rhinorrhea.      Mouth/Throat:      Mouth: Mucous membranes are moist.      Pharynx: No oropharyngeal exudate or posterior oropharyngeal erythema.      Comments: Cobblestoning posterior pharynx   Eyes:      General: No scleral icterus.        Right eye: No discharge.         Left eye: No discharge.      Conjunctiva/sclera: Conjunctivae normal.      Pupils: Pupils are equal, round, and reactive to light.   Neck:      Trachea: No tracheal deviation.   Cardiovascular:      Rate and Rhythm: Normal rate and regular rhythm.      Heart sounds: Normal heart sounds. No murmur heard.     No friction rub. No gallop.   Pulmonary:      Effort: Pulmonary effort is normal. No respiratory distress.      Breath sounds: Normal breath sounds. No stridor. No wheezing, rhonchi or rales.   Musculoskeletal:      Cervical back: Normal range of motion and neck supple. No rigidity or tenderness.   Lymphadenopathy:      Cervical: No cervical adenopathy.   Skin:     General: Skin is warm and dry.      Findings: No rash.      Comments: No acute rashes   Neurological:      Mental Status: He is alert and oriented to person, place, and time.   Psychiatric:         Mood and Affect: Mood normal.         Behavior: Behavior normal.

## 2024-11-08 NOTE — PATIENT INSTRUCTIONS
Take antibiotic as instructed.  Take prednisone as instructed.  Prescription cough medicine for home use only.  Do not take with any other potential sedating products.    They benefit from using Flonase nightly for the next 2 to 3 weeks.    Follow-up with primary care if not improving over the next 7 to 10 days.

## 2025-07-17 ENCOUNTER — TELEPHONE (OUTPATIENT)
Dept: FAMILY MEDICINE CLINIC | Facility: CLINIC | Age: 65
End: 2025-07-17

## 2025-07-17 NOTE — TELEPHONE ENCOUNTER
Called and left  for pt. Advised him to give us a call back if he would like to schedule a physical or if he has established care elsewhere. Ok per CC.

## (undated) DEVICE — SCD SEQUENTIAL COMPRESSION COMFORT SLEEVE MEDIUM KNEE LENGTH: Brand: KENDALL SCD

## (undated) DEVICE — 3M™ DURAPORE™ SURGICAL TAPE 1538-3, 3 INCH X 10 YARD (7,5CM X 9,1M), 4 ROLLS/BOX: Brand: 3M™ DURAPORE™

## (undated) DEVICE — SUT FIBERWIRE #2 1/2 CIRCLE T-5 38IN AR-7200

## (undated) DEVICE — TIBURON SPLIT SHEET: Brand: CONVERTORS

## (undated) DEVICE — DRESSING MEPILEX AG BORDER 4 X 12 IN

## (undated) DEVICE — CUFF TOURNIQUET 30 X 4 IN QUICK CONNECT DISP 1BLA

## (undated) DEVICE — GLOVE INDICATOR PI UNDERGLOVE SZ 8 BLUE

## (undated) DEVICE — COOL TEMP PAD

## (undated) DEVICE — COBAN 6 IN STERILE

## (undated) DEVICE — THE SIMPULSE SOLO SYSTEM WITH ULTREX RETRACTABLE SPLASH SHIELD TIP: Brand: SIMPULSE SOLO

## (undated) DEVICE — WEBRIL 6 IN UNSTERILE

## (undated) DEVICE — CEMENT MIXING CARTRIDGE PRISM II

## (undated) DEVICE — PADDING CAST 6IN COTTON STRL

## (undated) DEVICE — SUT STRATAFIX SPIRAL PDS PLUS 1 CTX 18 IN SXPP1A400

## (undated) DEVICE — IMPERVIOUS STOCKINETTE: Brand: DEROYAL

## (undated) DEVICE — HOOD: Brand: FLYTE

## (undated) DEVICE — 3M™ TEGADERM™ TRANSPARENT FILM DRESSING FRAME STYLE, 1624W, 2-3/8 IN X 2-3/4 IN (6 CM X 7 CM), 100/CT 4CT/CASE: Brand: 3M™ TEGADERM™

## (undated) DEVICE — ANTIBACTERIAL UNDYED BRAIDED (POLYGLACTIN 910), SYNTHETIC ABSORBABLE SUTURE: Brand: COATED VICRYL

## (undated) DEVICE — GLOVE SRG BIOGEL 8.5

## (undated) DEVICE — TWIST DRILL 3.2MM DIA 127.0MM LONG

## (undated) DEVICE — PACK TOTAL KNEE PBDS

## (undated) DEVICE — NEEDLE 22 G X 1 1/2 SAFETY

## (undated) DEVICE — PROXIMATE SKIN STAPLERS (35 WIDE) CONTAINS 35 STAINLESS STEEL STAPLES (FIXED HEAD): Brand: PROXIMATE

## (undated) DEVICE — TISSEEL FIBRIN 4ML FROZEN

## (undated) DEVICE — CAPIT KNEE ATTUNE FB CEMENT - DEPUY

## (undated) DEVICE — SUT VICRYL PLUS 1 CTX 36 IN VCP371H

## (undated) DEVICE — CHLORAPREP HI-LITE 26ML ORANGE

## (undated) DEVICE — SPONGE LAP 18 X 18 IN

## (undated) DEVICE — SURGICAL GOWN, XL SMARTSLEEVE: Brand: CONVERTORS

## (undated) DEVICE — COBAN 4 IN STERILE

## (undated) DEVICE — SKIN MARKER DUAL TIP WITH RULER CAP, FLEXIBLE RULER AND LABELS: Brand: DEVON

## (undated) DEVICE — ADHESIVE SKN CLSR HISTOACRYL FLEX 0.5ML LF

## (undated) DEVICE — SAW BLADE OSCILLATING BRAZOL 167

## (undated) DEVICE — 3M™ IOBAN™ 2 ANTIMICROBIAL INCISE DRAPE 6648EZ: Brand: IOBAN™ 2

## (undated) DEVICE — SYRINGE 20ML LL

## (undated) DEVICE — INTENDED FOR TISSUE SEPARATION, AND OTHER PROCEDURES THAT REQUIRE A SHARP SURGICAL BLADE TO PUNCTURE OR CUT.: Brand: BARD-PARKER SAFETY BLADES SIZE 10, STERILE

## (undated) DEVICE — GLOVE SRG BIOGEL 7

## (undated) DEVICE — 3000CC GUARDIAN II: Brand: GUARDIAN

## (undated) DEVICE — GLOVE INDICATOR PI UNDERGLOVE SZ 7 BLUE

## (undated) DEVICE — JP 3-SPRING RES W/10FR PVC DRAIN/TR: Brand: CARDINAL HEALTH

## (undated) DEVICE — TRAY FOLEY 16FR URIMETER SURESTEP

## (undated) DEVICE — STOCKINETTE REGULAR

## (undated) DEVICE — SAW BLADE RECIPROCATING 179